# Patient Record
Sex: FEMALE | Race: BLACK OR AFRICAN AMERICAN | NOT HISPANIC OR LATINO | URBAN - METROPOLITAN AREA
[De-identification: names, ages, dates, MRNs, and addresses within clinical notes are randomized per-mention and may not be internally consistent; named-entity substitution may affect disease eponyms.]

---

## 2021-01-25 ENCOUNTER — INPATIENT (INPATIENT)
Facility: HOSPITAL | Age: 36
LOS: 8 days | Discharge: HOME | DRG: 29 | End: 2021-02-03
Attending: INTERNAL MEDICINE | Admitting: STUDENT IN AN ORGANIZED HEALTH CARE EDUCATION/TRAINING PROGRAM
Payer: MEDICAID

## 2021-01-25 VITALS
HEIGHT: 65 IN | DIASTOLIC BLOOD PRESSURE: 95 MMHG | TEMPERATURE: 98 F | SYSTOLIC BLOOD PRESSURE: 179 MMHG | WEIGHT: 293 LBS | RESPIRATION RATE: 20 BRPM | OXYGEN SATURATION: 99 % | HEART RATE: 85 BPM

## 2021-01-25 DIAGNOSIS — Z98.84 BARIATRIC SURGERY STATUS: Chronic | ICD-10-CM

## 2021-01-25 LAB
ALBUMIN SERPL ELPH-MCNC: 4 G/DL — SIGNIFICANT CHANGE UP (ref 3.3–5)
ALP SERPL-CCNC: 78 U/L — SIGNIFICANT CHANGE UP (ref 40–120)
ALT FLD-CCNC: 10 U/L — SIGNIFICANT CHANGE UP (ref 10–45)
ANION GAP SERPL CALC-SCNC: 12 MMOL/L — SIGNIFICANT CHANGE UP (ref 5–17)
AST SERPL-CCNC: 12 U/L — SIGNIFICANT CHANGE UP (ref 10–40)
BILIRUB SERPL-MCNC: 0.3 MG/DL — SIGNIFICANT CHANGE UP (ref 0.2–1.2)
BUN SERPL-MCNC: 17 MG/DL — SIGNIFICANT CHANGE UP (ref 7–23)
CALCIUM SERPL-MCNC: 9.1 MG/DL — SIGNIFICANT CHANGE UP (ref 8.4–10.5)
CHLORIDE SERPL-SCNC: 104 MMOL/L — SIGNIFICANT CHANGE UP (ref 96–108)
CK SERPL-CCNC: 83 U/L — SIGNIFICANT CHANGE UP (ref 25–170)
CO2 SERPL-SCNC: 25 MMOL/L — SIGNIFICANT CHANGE UP (ref 22–31)
CREAT SERPL-MCNC: 0.6 MG/DL — SIGNIFICANT CHANGE UP (ref 0.5–1.3)
GLUCOSE SERPL-MCNC: 86 MG/DL — SIGNIFICANT CHANGE UP (ref 70–99)
HCT VFR BLD CALC: 37.7 % — SIGNIFICANT CHANGE UP (ref 34.5–45)
HGB BLD-MCNC: 13 G/DL — SIGNIFICANT CHANGE UP (ref 11.5–15.5)
MCHC RBC-ENTMCNC: 29.4 PG — SIGNIFICANT CHANGE UP (ref 27–34)
MCHC RBC-ENTMCNC: 34.5 GM/DL — SIGNIFICANT CHANGE UP (ref 32–36)
MCV RBC AUTO: 85.3 FL — SIGNIFICANT CHANGE UP (ref 80–100)
NRBC # BLD: 0 /100 WBCS — SIGNIFICANT CHANGE UP (ref 0–0)
PLATELET # BLD AUTO: 214 K/UL — SIGNIFICANT CHANGE UP (ref 150–400)
POTASSIUM SERPL-MCNC: 4.2 MMOL/L — SIGNIFICANT CHANGE UP (ref 3.5–5.3)
POTASSIUM SERPL-SCNC: 4.2 MMOL/L — SIGNIFICANT CHANGE UP (ref 3.5–5.3)
PROT SERPL-MCNC: 7.3 G/DL — SIGNIFICANT CHANGE UP (ref 6–8.3)
RBC # BLD: 4.42 M/UL — SIGNIFICANT CHANGE UP (ref 3.8–5.2)
RBC # FLD: 14.6 % — HIGH (ref 10.3–14.5)
SODIUM SERPL-SCNC: 141 MMOL/L — SIGNIFICANT CHANGE UP (ref 135–145)
WBC # BLD: 9.5 K/UL — SIGNIFICANT CHANGE UP (ref 3.8–10.5)
WBC # FLD AUTO: 9.5 K/UL — SIGNIFICANT CHANGE UP (ref 3.8–10.5)

## 2021-01-25 PROCEDURE — 72148 MRI LUMBAR SPINE W/O DYE: CPT | Mod: 26,MA

## 2021-01-25 PROCEDURE — 99285 EMERGENCY DEPT VISIT HI MDM: CPT

## 2021-01-25 PROCEDURE — 73502 X-RAY EXAM HIP UNI 2-3 VIEWS: CPT | Mod: 26,LT

## 2021-01-25 PROCEDURE — G1004: CPT

## 2021-01-25 PROCEDURE — 93010 ELECTROCARDIOGRAM REPORT: CPT

## 2021-01-25 PROCEDURE — 72132 CT LUMBAR SPINE W/DYE: CPT | Mod: 26,MF

## 2021-01-25 RX ORDER — ACETAMINOPHEN 500 MG
975 TABLET ORAL ONCE
Refills: 0 | Status: COMPLETED | OUTPATIENT
Start: 2021-01-25 | End: 2021-01-25

## 2021-01-25 RX ORDER — DEXAMETHASONE 0.5 MG/5ML
10 ELIXIR ORAL ONCE
Refills: 0 | Status: COMPLETED | OUTPATIENT
Start: 2021-01-25 | End: 2021-01-25

## 2021-01-25 RX ORDER — DEXAMETHASONE 0.5 MG/5ML
10 ELIXIR ORAL ONCE
Refills: 0 | Status: DISCONTINUED | OUTPATIENT
Start: 2021-01-25 | End: 2021-01-25

## 2021-01-25 RX ORDER — OXYCODONE HYDROCHLORIDE 5 MG/1
5 TABLET ORAL ONCE
Refills: 0 | Status: DISCONTINUED | OUTPATIENT
Start: 2021-01-25 | End: 2021-01-25

## 2021-01-25 RX ADMIN — OXYCODONE HYDROCHLORIDE 5 MILLIGRAM(S): 5 TABLET ORAL at 20:12

## 2021-01-25 NOTE — ED PROVIDER NOTE - PHYSICAL EXAMINATION
GENERAL: Awake, alert, NAD, laying on right side in stretcher  HEENT: NC/AT, moist mucous membranes, PERRL, EOMI  LUNGS: CTAB, no wheezes or crackles   CARDIAC: RRR, no m/r/g  ABDOMEN: Soft, normal BS, non tender, non distended, no rebound, no guarding  BACK: No midline spinal tenderness, no CVA tenderness  EXT: No edema, no calf tenderness, 2+ DP pulses bilaterally, no deformities. LLE - Full ROM knee, hip joint. No obvious deformities or ecchymosis. No pelvic instability.   NEURO: A&Ox3. Moving all extremities.  SKIN: Warm and dry. No rash.  PSYCH: Normal affect. GENERAL: Awake, alert, NAD, laying on right side in stretcher  HEENT: NC/AT, moist mucous membranes, PERRL, EOMI  LUNGS: CTAB, no wheezes or crackles   CARDIAC: RRR, no m/r/g  ABDOMEN: Soft, normal BS, non tender, non distended, no rebound, no guarding  BACK: No midline spinal tenderness, no CVA tenderness  EXT: No edema, no calf tenderness, 2+ DP pulses bilaterally, no deformities. LLE - Full ROM knee, hip joint. No obvious deformities or ecchymosis. No pelvic instability.   NEURO: A&Ox3. Moving all extremities. 5/5 strength RLE, 4.5/5 strength LLE. Numbness L groin.   SKIN: Warm and dry. No rash.  PSYCH: Normal affect.

## 2021-01-25 NOTE — ED PROVIDER NOTE - NS ED ROS FT
CONST: no fevers, no chills  EYES: no pain, no vision changes  ENT: no sore throat, no ear pain, no change in hearing  CV: no chest pain, no leg swelling  RESP: no shortness of breath, no cough  ABD: no abdominal pain, no nausea, no vomiting, no diarrhea  : no dysuria, no flank pain, no hematuria  MSK: no back pain, + left buttocks pain  NEURO: no headache or additional neurologic complaints  HEME: no easy bleeding  SKIN:  no rash

## 2021-01-25 NOTE — CONSULT NOTE ADULT - ATTENDING COMMENTS
Patient seen and examined.  Patient feels improved with the steroids.  On current exam she has full strength.  No numbness or tingling.  She has been up and ambulating and going to the bathroom without difficulty.  No saddle anesthesia.  MRI has been reviewed with the patient.  She has a very large disc herniation L5-S1.  In light of this and given her previous symptoms of numbness I have recommended surgery.  Risks and benefits were discussed at length.  At this point the patient declineds surgery and wishes to continue with nonsurgical management.  Patient was again counseled on signs and symptoms of cauda equina syndrome including buttock numbness or saddle anesthesia, bowel or bladder incontinence or retention, and weakness.  She was advised to tell us of these symptoms immediately. Recommend continue Decadron and monitoring of exam for neurologic changes.

## 2021-01-25 NOTE — ED ADULT NURSE NOTE - NSIMPLEMENTINTERV_GEN_ALL_ED
Implemented All Fall with Harm Risk Interventions:  [place] to call system. Call bell, personal items and telephone within reach. Instruct patient to call for assistance. Room bathroom lighting operational. Non-slip footwear when patient is off stretcher. Physically safe environment: no spills, clutter or unnecessary equipment. Stretcher in lowest position, wheels locked, appropriate side rails in place. Provide visual cue, wrist band, yellow gown, etc. Monitor gait and stability. Monitor for mental status changes and reorient to person, place, and time. Review medications for side effects contributing to fall risk. Reinforce activity limits and safety measures with patient and family. Provide visual clues: red socks.

## 2021-01-25 NOTE — CONSULT NOTE ADULT - ASSESSMENT
A/P :   Patient is a 35yFemale w/ chronic LBP and urinary incontinence now with left saddle anesthesia.    -Recommend CT/MRI LSpine to r/o cauda equina.   -Further recommendations to follow pending imaging.  -Low concern for gluteal compartment syndrome  -WBAT  -PT/OT prn  -Patient was extensively educated about the signs and symptoms of osteomyelitis, epidural abscess, discitis, acute cord compression. The patient was advised to return to notify physician/ed should he develop neurological symptoms such as weakness, numbness/tingling/paresthesias, worsening pain, bowel/bladder incontinence, or fevers/chills.  -Recommend weight loss/core strengthening for improved back health.  -Multimodal analgesia - recommend low dose opioids, acetaminophen, muscle relaxant as tolerated  -All patient's questions answered. Patient understands and agrees with above plan.  -Clinical presentation discussed w/ Dr. Keane who is aware and agrees w/ above plan.    Richard Mcgee M.D.   Orthopaedic Surgery  [id number] ***INCOMPLETE NOTE/IN PROGRESS/Full Note To Follow***    A/P :   Patient is a 35yFemale w/ chronic LBP and urinary incontinence now with left sided buttock numbness    -Low concern for gluteal compartment syndrome  -WBAT  -PT/OT prn  -Patient was extensively educated about the signs and symptoms of osteomyelitis, epidural abscess, discitis, acute cord compression. The patient was advised to return to notify physician/ed should he develop neurological symptoms such as weakness, numbness/tingling/paresthesias, worsening pain, bowel/bladder incontinence, or fevers/chills.  -Recommend weight loss/core strengthening for improved back health.  -Multimodal analgesia - recommend low dose opioids, acetaminophen, muscle relaxant as tolerated  -All patient's questions answered. Patient understands and agrees with above plan.  -Clinical presentation discussed w/ Dr. Keane who is aware and agrees w/ above plan.    Richard Mcgee M.D.   Orthopaedic Surgery  [id number] A/P :   Patient is a 35yFemale w/ chronic LBP and urinary incontinence now with left sided buttock numbness.     -Had an extensive discussion with patient regarding risk/benefit of surgical intervention versus trial of conservative management. Pt would like to trial conservative management at this time and is aware of possible need for surgical intervention in the future.   -Recommend admit to medical service   -Recommend IV decadron 10 q6h  -Multimodal analgesia - recommend low dose opioids, acetaminophen, muscle relaxant as tolerated  -Low concern for gluteal compartment syndrome  -WBAT  -PT/OT prn  -Patient was extensively educated about the signs and symptoms of osteomyelitis, epidural abscess, discitis, acute cord compression. The patient was advised to return to notify physician/ed should she develop neurological symptoms such as weakness, numbness/tingling/paresthesias, worsening pain, bowel/bladder incontinence, or fevers/chills.  -Recommend weight loss/core strengthening for improved back health.  -All patient's questions answered. Patient understands and agrees with above plan.  -Imaging & clinical presentation discussed w/ Dr. Keane who is aware and agrees w/ above plan.    Richard Mcgee M.D.   Orthopaedic Surgery  [id number]

## 2021-01-25 NOTE — ED ADULT NURSE REASSESSMENT NOTE - NS ED NURSE REASSESS COMMENT FT1
Report received from Dulce Maria BECERRA who stated that pt refused Tylenol. Pt A&Ox4, continues to c/o 10/10 Lt buttock pain that radiates down the LLE... Report received from Dulce Maria BECERRA who stated that pt refused Tylenol. Pt A&Ox4, continues to c/o 10/10 Lt buttock pain that radiates down the LLE. Pt continues to decline Tylenol, stating "Tylenol never helps me with any pain." William ZUNIGA aware, recommends administering 5 mg oxycodone. Report received from Dulce Maria BECERRA who stated that pt refused Tylenol. Pt A&Ox4, continues to c/o 10/10 Lt buttock pain that radiates down the LLE. Pt continues to decline Tylenol, stating "Tylenol never helps me with any pain." Assisted pt with repositioning for comfort. William ZUNIGA aware, recommends administering 5 mg oxycodone. Report received from Dulce Maria BECERRA who stated that pt refused Tylenol. Pt A&Ox4, continues to c/o 10/10 Lt buttock pain that radiates down the LLE. Pt continues to decline Tylenol, stating "Tylenol never helps me with any pain." Assisted pt with repositioning for comfort. Pt's /115, denies blurry vision & H/A. Pt attributes elevated BP to sensation of pain in buttock. Pt states that she took home Losartan this AM as prescribed. William ZUNIGA aware, recommends administering 5 mg oxycodone.

## 2021-01-25 NOTE — ED PROVIDER NOTE - ATTENDING CONTRIBUTION TO CARE
attending William: 35yF h/o morbid obesity, HTN, urinary continence s/p gastric sleeve surgery presents with L sided pain after mechanical fall last night. Reports L knee gave out, fell on to her L side. Was unable to get up from floor 2/2 pain x 12 hours. Pain assoc with "numbness" to L groin area. Saddle anesthesia on exam with ?chronic urinary incontinence. Concern for cauda equina. Will obtain labs, pain control, CT and MR lumbar spine, spine surgery eval in ED, likely admit.

## 2021-01-25 NOTE — ED ADULT NURSE NOTE - OBJECTIVE STATEMENT
pt 34 yo female morbidly obese and htn hx s/p fall last night in house denies loc states she slept on floor until a friend helped her up this morning pt usually ambulates independently pt states no relief after she took motrin flexeril at home only comfortable position is lying reclined on right side pt alert oriented yue any furter complains assisted in changing into gown and pt is pending md [place] skin warm dry no nausea no vomiting pt states she fell onto tile floor and denies any head injury

## 2021-01-25 NOTE — ED PROVIDER NOTE - CLINICAL SUMMARY MEDICAL DECISION MAKING FREE TEXT BOX
35 year old F s/p fall. +groin anesthesia. Non ambulatory post fall. Will CT spine, XR L hip and L pelvis. Pain control. Consult spine.

## 2021-01-25 NOTE — ED ADULT NURSE REASSESSMENT NOTE - NS ED NURSE REASSESS COMMENT FT1
MRI paperwork given to Vipul (pt rep) who is faxing it to MRI department for MRI scheduled for 2200 tonight.

## 2021-01-25 NOTE — CONSULT NOTE ADULT - SUBJECTIVE AND OBJECTIVE BOX
Patient is a 35yFemale community ambulator without assistive devices who presents to [facility] ED w/ a c/o of back pain and DARIELA sp MF yesterday. Patient states she was unable to get up after falling and remained down until today. Denies HS/LOC. Localizing pain to low back, denies radiation of pain elsewhere. States inability to ambulate immediately following the injury. Denies pain down legs, Reports left saddle anesthesia. Reports chronic history of urinary incontinence & LBP but denies bowel incontinence. Denies fevers/chills Denies having any other pain elsewhere. Denies any previous orthopaedic history. No other orthopaedic concerns at this time.     PAST MEDICAL & SURGICAL HISTORY:  Hypertension  H/O bariatric surgery   delivery delivered    Vital Signs Last 24 Hrs  T(C): 36.7 (2021 19:47), Max: 36.7 (2021 15:59)  T(F): 98.1 (2021 19:47), Max: 98.1 (2021 15:59)  HR: 83 (2021 19:47) (83 - 85)  BP: 177/115 (2021 19:47) (177/115 - 179/95)  BP(mean): --  RR: 24 (2021 19:47) (20 - 24)  SpO2: 100% (2021 19:47) (99% - 100%)  I&O's Detail      LABS:                        13.0   9.50  )-----------( 214      ( 2021 18:26 )             37.7         141  |  104  |  17  ----------------------------<  86  4.2   |  25  |  0.60    Ca    9.1      2021 18:26    TPro  7.3  /  Alb  4.0  /  TBili  0.3  /  DBili  x   /  AST  12  /  ALT  10  /  AlkPhos  78            PHYSICAL EXAM:  General; Awake and alert, Oriented x 3, following commands  Spine: No TTP over spinous processes or paraspinal muscles at C/T/L spine. No palpable step off.   Able to actively SLR BL. No pain to passive SLR    + Passive/Active rectal tone  + saddle anesthesia on the left    Motor exam:        C5       C6       C7       C8       T1   R  5/5      5/5     5/5     5/5      5/5  L   5/5      5/5     5/5     5/5      5/5         L2        L3       L4       L5      S1  R  5/5      5/5     5/5     5/5    5/5  L   5/5     5/5      5/5     5/5    5/5    Sensory:  (0=absent, 1=impaired, 2=normal, NT=not testable)      C5    C6   C7   C8   T1         R   2     2      2     2      2  L    2     2      2     2      2        L2    L3   L4   L5   S1   R   2     2     2     2     2  L    2     2     2     0     2    BL Upper extremity:   Negative Madsen  +Rad Pulse  Compartments soft and compressible           BL Lower Extremity:  SILT L2-S1  Negative Clonus   Negative Babinski  +DP  Compartments soft and compressible    Secondary  Skin intact. No erythema/ecchymosis. No TTP over bony prominences, SILT, palpable pulses, full/painless A/PROM, compartments soft. No other injuries or complaints. Negative logroll/heelstrike BL.                                              Patient is a 35yFemale community ambulator without assistive devices who presents to [facility] ED w/ a c/o of back pain and DARIELA sp MF yesterday. Patient states she was unable to get up after falling and remained down until today. Denies HS/LOC. Localizing pain to low back, denies radiation of pain elsewhere. States inability to ambulate immediately following the injury. Denies pain down legs, Reports numbness about the left buttock/groin. Reports chronic history of urinary incontinence & LBP but denies bowel incontinence. Denies fevers/chills Denies having any other pain elsewhere. Denies any previous orthopaedic history. No other orthopaedic concerns at this time.     PAST MEDICAL & SURGICAL HISTORY:  Hypertension  H/O bariatric surgery   delivery delivered    Vital Signs Last 24 Hrs  T(C): 36.7 (2021 19:47), Max: 36.7 (2021 15:59)  T(F): 98.1 (2021 19:47), Max: 98.1 (2021 15:59)  HR: 83 (2021 19:47) (83 - 85)  BP: 177/115 (2021 19:47) (177/115 - 179/95)  BP(mean): --  RR: 24 (2021 19:47) (20 - 24)  SpO2: 100% (2021 19:47) (99% - 100%)  I&O's Detail      LABS:                        13.0   9.50  )-----------( 214      ( 2021 18:26 )             37.7         141  |  104  |  17  ----------------------------<  86  4.2   |  25  |  0.60    Ca    9.1      2021 18:26    TPro  7.3  /  Alb  4.0  /  TBili  0.3  /  DBili  x   /  AST  12  /  ALT  10  /  AlkPhos  78            PHYSICAL EXAM:  General; Awake and alert, Oriented x 3, following commands  Spine: No TTP over spinous processes or paraspinal muscles at C/T/L spine. No palpable step off.   Able to actively SLR BL. No pain to passive SLR    + Passive/Active rectal tone  +numbness about the left buttock    Motor exam:        C5       C6       C7       C8       T1   R  5/5      5/5     5/5     5/5      5/5  L   5/5      5/5     5/5     5/5      5/5         L2        L3       L4       L5      S1  R  5/5      5/5     5/5     5/5    5/5  L   5/5     5/5      5/5     5/5    5/5    Sensory:  (0=absent, 1=impaired, 2=normal, NT=not testable)      C5    C6   C7   C8   T1         R   2     2      2     2      2  L    2     2      2     2      2        L2    L3   L4   L5   S1   R   2     2     2     2     2  L    2     2     2     0     2    BL Upper extremity:   Negative Madsen  +Rad Pulse  Compartments soft and compressible           BL Lower Extremity:  SILT L2-S1  Negative Clonus   Negative Babinski  +DP  Compartments soft and compressible    Secondary  Skin intact. No erythema/ecchymosis. No TTP over bony prominences, SILT, palpable pulses, full/painless A/PROM, compartments soft. No other injuries or complaints. Negative logroll/heelstrike BL.                                             Imaging:  CT/MRI Demonstrates multilevel degenerative disc disease, HNP w/ moderate central stenosis at L3/4 & L4-5, large HNP w/ severe central stenosis at L5-S1.

## 2021-01-25 NOTE — ED PROVIDER NOTE - OBJECTIVE STATEMENT
35 year old F with PMH HTN, urinary continence s/p gastric sleeve surgery presenting with L sided body pain after a mechanical fall in her home last night. Patient was in her bedroom when she felt her L knee give out and fell onto her left side. No LOC or head trauma, not on any anticoagulation. States she could not get up after falling so she slept on the floor all night. Her friend with a wheelchair picked her up today and brought her to ED. Took 1200mg ibuprofen and 10mg flexeril at 11am with no relief. Non ambulatory in ED. Notes a history of chronic lower back pain. Also endorses numbness in the vaginal area, complaing mostly of pain in her left buttocks. Denies CP, SOB, cough, LE edema, fevers, abdominal pain. 35 year old F with PMH HTN, urinary continence s/p gastric sleeve surgery presenting with L sided body pain after a mechanical fall in her home last night. Patient was in her bedroom when she felt her L knee give out and fell onto her left side. No LOC or head trauma, not on any anticoagulation. States she could not get up after falling so she slept on the floor all night. Her friend with a wheelchair picked her up today and brought her to ED. Took 1200mg ibuprofen and 10mg flexeril at 11am with no relief. Non ambulatory in ED. Notes a history of chronic lower back pain. Also endorses numbness in the vaginal area, complaining mostly of pain in her left buttocks. Denies CP, SOB, cough, LE edema, fevers, abdominal pain.

## 2021-01-26 DIAGNOSIS — Z79.899 OTHER LONG TERM (CURRENT) DRUG THERAPY: ICD-10-CM

## 2021-01-26 DIAGNOSIS — G83.4 CAUDA EQUINA SYNDROME: ICD-10-CM

## 2021-01-26 DIAGNOSIS — Z29.9 ENCOUNTER FOR PROPHYLACTIC MEASURES, UNSPECIFIED: ICD-10-CM

## 2021-01-26 DIAGNOSIS — I10 ESSENTIAL (PRIMARY) HYPERTENSION: ICD-10-CM

## 2021-01-26 DIAGNOSIS — E66.01 MORBID (SEVERE) OBESITY DUE TO EXCESS CALORIES: ICD-10-CM

## 2021-01-26 DIAGNOSIS — M51.9 UNSPECIFIED THORACIC, THORACOLUMBAR AND LUMBOSACRAL INTERVERTEBRAL DISC DISORDER: ICD-10-CM

## 2021-01-26 DIAGNOSIS — R20.0 ANESTHESIA OF SKIN: ICD-10-CM

## 2021-01-26 DIAGNOSIS — M54.42 LUMBAGO WITH SCIATICA, LEFT SIDE: ICD-10-CM

## 2021-01-26 DIAGNOSIS — Z98.84 BARIATRIC SURGERY STATUS: ICD-10-CM

## 2021-01-26 DIAGNOSIS — Z71.6 TOBACCO ABUSE COUNSELING: ICD-10-CM

## 2021-01-26 LAB — SARS-COV-2 RNA SPEC QL NAA+PROBE: SIGNIFICANT CHANGE UP

## 2021-01-26 PROCEDURE — 99223 1ST HOSP IP/OBS HIGH 75: CPT | Mod: 25

## 2021-01-26 PROCEDURE — 12345: CPT | Mod: NC,GC

## 2021-01-26 PROCEDURE — 99406 BEHAV CHNG SMOKING 3-10 MIN: CPT

## 2021-01-26 RX ORDER — GABAPENTIN 400 MG/1
300 CAPSULE ORAL THREE TIMES A DAY
Refills: 0 | Status: DISCONTINUED | OUTPATIENT
Start: 2021-01-26 | End: 2021-01-26

## 2021-01-26 RX ORDER — ACETAMINOPHEN 500 MG
650 TABLET ORAL EVERY 6 HOURS
Refills: 0 | Status: DISCONTINUED | OUTPATIENT
Start: 2021-01-26 | End: 2021-02-03

## 2021-01-26 RX ORDER — DULOXETINE HYDROCHLORIDE 30 MG/1
30 CAPSULE, DELAYED RELEASE ORAL DAILY
Refills: 0 | Status: DISCONTINUED | OUTPATIENT
Start: 2021-01-26 | End: 2021-02-02

## 2021-01-26 RX ORDER — HYDROCHLOROTHIAZIDE 25 MG
25 TABLET ORAL ONCE
Refills: 0 | Status: COMPLETED | OUTPATIENT
Start: 2021-01-26 | End: 2021-01-26

## 2021-01-26 RX ORDER — HYDRALAZINE HCL 50 MG
10 TABLET ORAL ONCE
Refills: 0 | Status: COMPLETED | OUTPATIENT
Start: 2021-01-26 | End: 2021-01-26

## 2021-01-26 RX ORDER — OXYCODONE HYDROCHLORIDE 5 MG/1
5 TABLET ORAL EVERY 4 HOURS
Refills: 0 | Status: DISCONTINUED | OUTPATIENT
Start: 2021-01-26 | End: 2021-01-27

## 2021-01-26 RX ORDER — ACETAMINOPHEN 500 MG
650 TABLET ORAL EVERY 6 HOURS
Refills: 0 | Status: DISCONTINUED | OUTPATIENT
Start: 2021-01-26 | End: 2021-01-26

## 2021-01-26 RX ORDER — LABETALOL HCL 100 MG
5 TABLET ORAL ONCE
Refills: 0 | Status: COMPLETED | OUTPATIENT
Start: 2021-01-26 | End: 2021-01-26

## 2021-01-26 RX ORDER — OXYCODONE HYDROCHLORIDE 5 MG/1
5 TABLET ORAL ONCE
Refills: 0 | Status: DISCONTINUED | OUTPATIENT
Start: 2021-01-26 | End: 2021-01-26

## 2021-01-26 RX ORDER — FOLIC ACID 0.8 MG
1 TABLET ORAL
Qty: 0 | Refills: 0 | DISCHARGE

## 2021-01-26 RX ORDER — CHOLECALCIFEROL (VITAMIN D3) 125 MCG
1 CAPSULE ORAL
Qty: 0 | Refills: 0 | DISCHARGE

## 2021-01-26 RX ORDER — SENNA PLUS 8.6 MG/1
2 TABLET ORAL AT BEDTIME
Refills: 0 | Status: DISCONTINUED | OUTPATIENT
Start: 2021-01-26 | End: 2021-01-28

## 2021-01-26 RX ORDER — FOLIC ACID 0.8 MG
1 TABLET ORAL DAILY
Refills: 0 | Status: DISCONTINUED | OUTPATIENT
Start: 2021-01-26 | End: 2021-01-30

## 2021-01-26 RX ORDER — ENOXAPARIN SODIUM 100 MG/ML
40 INJECTION SUBCUTANEOUS DAILY
Refills: 0 | Status: DISCONTINUED | OUTPATIENT
Start: 2021-01-26 | End: 2021-01-29

## 2021-01-26 RX ORDER — HYDROCHLOROTHIAZIDE 25 MG
50 TABLET ORAL DAILY
Refills: 0 | Status: DISCONTINUED | OUTPATIENT
Start: 2021-01-27 | End: 2021-02-03

## 2021-01-26 RX ORDER — PREGABALIN 225 MG/1
1 CAPSULE ORAL
Qty: 0 | Refills: 0 | DISCHARGE

## 2021-01-26 RX ORDER — LOSARTAN POTASSIUM 100 MG/1
100 TABLET, FILM COATED ORAL DAILY
Refills: 0 | Status: DISCONTINUED | OUTPATIENT
Start: 2021-01-26 | End: 2021-02-03

## 2021-01-26 RX ORDER — PREGABALIN 225 MG/1
1000 CAPSULE ORAL DAILY
Refills: 0 | Status: DISCONTINUED | OUTPATIENT
Start: 2021-01-26 | End: 2021-02-03

## 2021-01-26 RX ORDER — DEXAMETHASONE 0.5 MG/5ML
6 ELIXIR ORAL EVERY 6 HOURS
Refills: 0 | Status: DISCONTINUED | OUTPATIENT
Start: 2021-01-26 | End: 2021-01-26

## 2021-01-26 RX ORDER — PANTOPRAZOLE SODIUM 20 MG/1
40 TABLET, DELAYED RELEASE ORAL
Refills: 0 | Status: DISCONTINUED | OUTPATIENT
Start: 2021-01-26 | End: 2021-02-03

## 2021-01-26 RX ORDER — DEXAMETHASONE 0.5 MG/5ML
10 ELIXIR ORAL EVERY 6 HOURS
Refills: 0 | Status: COMPLETED | OUTPATIENT
Start: 2021-01-26 | End: 2021-01-27

## 2021-01-26 RX ORDER — HYDROCHLOROTHIAZIDE 25 MG
25 TABLET ORAL DAILY
Refills: 0 | Status: DISCONTINUED | OUTPATIENT
Start: 2021-01-26 | End: 2021-01-26

## 2021-01-26 RX ADMIN — Medication 25 MILLIGRAM(S): at 14:47

## 2021-01-26 RX ADMIN — DULOXETINE HYDROCHLORIDE 30 MILLIGRAM(S): 30 CAPSULE, DELAYED RELEASE ORAL at 14:47

## 2021-01-26 RX ADMIN — PANTOPRAZOLE SODIUM 40 MILLIGRAM(S): 20 TABLET, DELAYED RELEASE ORAL at 05:53

## 2021-01-26 RX ADMIN — LOSARTAN POTASSIUM 100 MILLIGRAM(S): 100 TABLET, FILM COATED ORAL at 05:53

## 2021-01-26 RX ADMIN — GABAPENTIN 300 MILLIGRAM(S): 400 CAPSULE ORAL at 12:19

## 2021-01-26 RX ADMIN — Medication 1 MILLIGRAM(S): at 12:19

## 2021-01-26 RX ADMIN — Medication 650 MILLIGRAM(S): at 05:34

## 2021-01-26 RX ADMIN — Medication 1 TABLET(S): at 12:19

## 2021-01-26 RX ADMIN — OXYCODONE HYDROCHLORIDE 5 MILLIGRAM(S): 5 TABLET ORAL at 12:19

## 2021-01-26 RX ADMIN — Medication 25 MILLIGRAM(S): at 05:54

## 2021-01-26 RX ADMIN — Medication 650 MILLIGRAM(S): at 17:10

## 2021-01-26 RX ADMIN — Medication 102 MILLIGRAM(S): at 17:11

## 2021-01-26 RX ADMIN — Medication 10 MILLIGRAM(S): at 17:25

## 2021-01-26 RX ADMIN — OXYCODONE HYDROCHLORIDE 5 MILLIGRAM(S): 5 TABLET ORAL at 05:31

## 2021-01-26 RX ADMIN — Medication 102 MILLIGRAM(S): at 05:54

## 2021-01-26 RX ADMIN — ENOXAPARIN SODIUM 40 MILLIGRAM(S): 100 INJECTION SUBCUTANEOUS at 14:47

## 2021-01-26 RX ADMIN — Medication 102 MILLIGRAM(S): at 00:00

## 2021-01-26 RX ADMIN — OXYCODONE HYDROCHLORIDE 5 MILLIGRAM(S): 5 TABLET ORAL at 17:11

## 2021-01-26 RX ADMIN — PREGABALIN 1000 MICROGRAM(S): 225 CAPSULE ORAL at 12:19

## 2021-01-26 RX ADMIN — Medication 102 MILLIGRAM(S): at 12:20

## 2021-01-26 RX ADMIN — OXYCODONE HYDROCHLORIDE 5 MILLIGRAM(S): 5 TABLET ORAL at 00:24

## 2021-01-26 RX ADMIN — Medication 10 MILLIGRAM(S): at 19:08

## 2021-01-26 RX ADMIN — Medication 650 MILLIGRAM(S): at 12:19

## 2021-01-26 NOTE — PROVIDER CONTACT NOTE (OTHER) - BACKGROUND
HTN, back pain
Back pain s/p fall at home, High blood Pressure
Hypertension , Back pain s/p fall at home

## 2021-01-26 NOTE — H&P ADULT - NSHPREVIEWOFSYSTEMS_GEN_ALL_CORE
CONSTITUTIONAL: No fever, no chills  EYES: No eye pain, no acute blindness  Mouth: no pain in mouth, no cuts  RESPIRATORY: No cough, No sob  CARDIOVASCULAR: No CP, no palpitations  GASTROINTESTINAL: no abdominal pain, no n/v/d  GENITOURINARY: No dysuria, no hematuria  Heme: No easy bruising, no swelling of neck  NEUROLOGICAL: No seizure, No acute paralysis, lower back pain+, numbness in groin and buttocks+  SKIN: No itching, no rashes  MUSCULOSKELETAL: No acute joint pain, no joint swelling

## 2021-01-26 NOTE — PROGRESS NOTE ADULT - PROBLEM SELECTOR PROBLEM 5
Class 3 severe obesity with serious comorbidity and body mass index (BMI) of 50.0 to 59.9 in adult, unspecified obesity type Tobacco abuse counseling

## 2021-01-26 NOTE — PROGRESS NOTE ADULT - PROBLEM SELECTOR PLAN 5
Patient advised to follow up with obesity medicine specialist to explore pharmacologic treatment I spent 4 minutes with patient discussing tobacco use cessation. Patient counseled on available pharmacologic interventions.    Billing Code:[id number] - smoking cessation  counselling provided

## 2021-01-26 NOTE — PROGRESS NOTE ADULT - ASSESSMENT
35F w/ class III obesity s/p gastric sleeve(2019; 100lb weight loss), chronic lower back pain with sciatica, HTN, chronic urinary incontinence presents with back pain and numbness over buttocks, groin, and left foot after fall admitted to medicine for further management. MRI demonstrates large central through left subvarticular zone disc extrusion at L5-S1 occupying nearly the entire cross-sectional area of the spinal cord and severely effacing the thecal sac and compressing the cauda equina nerve roots.  35F w/ class III obesity s/p gastric sleeve(2019; 100lb weight loss), chronic lower back pain with sciatica, HTN, chronic urinary incontinence presents with back pain and numbness over buttocks, groin, and left foot after fall admitted to medicine for further management. Physical exam is significant for decreased left leg strength 4/5 and decreased sensation of left hip, left foot and toes. MRI demonstrates compression of the cauda equina nerve roots by disc at L5-S1.

## 2021-01-26 NOTE — PROGRESS NOTE ADULT - PROBLEM SELECTOR PROBLEM 3
Mom called concerned he may have blood in his stool.   Essential hypertension Bariatric surgery status

## 2021-01-26 NOTE — PROVIDER CONTACT NOTE (OTHER) - ASSESSMENT
No s/s of distress. No new complaints
Patient c/o headaches that she verbalizes of her elevated BP. No new s/s
no signs of distress noted

## 2021-01-26 NOTE — H&P ADULT - PROBLEM SELECTOR PLAN 6
I spent 4 minutes with patient discussing tobacco use cessation. Patient counseled on available pharmacoligic interventions.    Billing Code:[id number]

## 2021-01-26 NOTE — H&P ADULT - NSHPPHYSICALEXAM_GEN_ALL_CORE
Vital Signs Last 24 Hrs  T(C): 36.7 (26 Jan 2021 00:12), Max: 36.7 (25 Jan 2021 15:59)  T(F): 98 (26 Jan 2021 00:12), Max: 98.1 (25 Jan 2021 15:59)  HR: 78 (26 Jan 2021 00:12) (78 - 85)  BP: 172/101 (26 Jan 2021 00:13) (172/101 - 188/123)  BP(mean): 124 (26 Jan 2021 00:13) (124 - 141)  RR: 24 (26 Jan 2021 00:12) (20 - 24)  SpO2: 100% (26 Jan 2021 00:12) (99% - 100%) on RA    GENERAL: NAD while lying on right side, obese  HEAD:  Atraumatic, Normocephalic  EYES: EOMI, PERRL, conjunctiva and sclera clear  Mouth: MMM, no lesions  NECK: Supple, no appreciable masses  Lung: normal work of breathing, cta b/l  Chest: S1&S2+, rrr, no m/r/g appreciated  ABDOMEN: bs+, soft, nt, nd, no appreciable masses  : No garcia catheter, no CVA tenderness  EXTREMITIES:  radial pulse present b/l, PT present b/l, no pitting edema present  Neuro: A&Ox3, no flaccid paralysis in extremities appreciated, reported numbness over palpation to left hip/buttock and left foot  SKIN: warm and dry, no visible purulence in exposed areas

## 2021-01-26 NOTE — H&P ADULT - PROBLEM SELECTOR PLAN 8
Hold ac as patient may require surgery if not improving Hold ac as patient may require surgery if not improving  istop#: [id number]   01/02/2021 01/07/2021 oxycodone hcl 5 mg tablet  6 2 [facility] #[id number]

## 2021-01-26 NOTE — PROGRESS NOTE ADULT - PROBLEM SELECTOR PLAN 4
s/p gastric sleeve  continue with vitamin supplementation Patient advised to follow up with obesity medicine specialist to explore pharmacologic treatment - outpatient f/u obesity clinic

## 2021-01-26 NOTE — H&P ADULT - NSHPSOCIALHISTORY_GEN_ALL_CORE
current smoker (1 pack per week), no alcohol, rare social marijuana, no other recreational drug. . has 2 young children

## 2021-01-26 NOTE — PROGRESS NOTE ADULT - PROBLEM SELECTOR PROBLEM 4
Bariatric surgery status Class 3 severe obesity with serious comorbidity and body mass index (BMI) of 50.0 to 59.9 in adult, unspecified obesity type

## 2021-01-26 NOTE — PHYSICAL THERAPY INITIAL EVALUATION ADULT - PRECAUTIONS/LIMITATIONS, REHAB EVAL
burning pain over lower back over tailbone, associated with numbness off her left buttock and groin, numbness of left 4th-6th toe, worsened when seated upright or when walking, improved when lying on the side, not improved with rest. She presented to the hospital because it did not improve over the day, the pain was in a new location and more severe than previous, and the sensation of numbness was a new symptom.  (-) XR pelvis, (-) XR hips, MRI L-spine:  Large central through left subarticular zone disc extrusion at L5-S1 occupying nearly the entire cross-sectional area the spinal cord and severely effacing the thecal sac and compressing the cauda equina nerve roots. Multilevel spondylosis superimposed on congenital lumbar spinal canal narrowing with disc protrusion at L3-L4 resulting in moderate to severe central spinal canal stenosis and disc bulge at L4-L5

## 2021-01-26 NOTE — H&P ADULT - PROBLEM SELECTOR PLAN 1
Seen by Ortho Spine. CT lumbar spine documents significant stenosis. MR spine completed with read pending. Per Spine, for now dexamethasone 10mg IV every 6 hour.  - oxycodone 5mg every 4 hour as needed for moderate to severe pain, acetaminophen for mild pain  - patient reports trying gabapentin in the past but not tolerating. pending response to steroid maybe consider duloxetine vs another trial of gabapentin if patient is amenable.  - Per Ortho Spine, WBAT. PT/OT orders placed.

## 2021-01-26 NOTE — H&P ADULT - ASSESSMENT
35F w/ class III obesity s/p gastric sleeve(2019; 100lb weight loss), chronic lower back pain with sciatica, HTN, chronic urinary incontinence presents with back pain and numbness over buttocks, groin, and left foot after fall admitted to medicine for further management.

## 2021-01-26 NOTE — PROGRESS NOTE ADULT - PROBLEM SELECTOR PLAN 8
Hold ac as patient may require surgery if not improving  istop#: [id number]   01/02/2021 01/07/2021 oxycodone hcl 5 mg tablet  6 2 [facility] #[id number]

## 2021-01-26 NOTE — PROGRESS NOTE ADULT - PROBLEM SELECTOR PLAN 7
The patient could not remember all of her meds or dosing. will need day team to follow up The patient could not remember all of her meds or dosing. Followed up [facility] as patient may require surgery if not improving  istop#: [id number]   01/02/2021 01/07/2021 oxycodone hcl 5 mg tablet  6 2 [id number] DVT ppx: lovenox  istop#: [id number]   01/02/2021 01/07/2021 oxycodone hcl 5 mg tablet  6 2 [facility] #[id number]

## 2021-01-26 NOTE — PROGRESS NOTE ADULT - PROBLEM SELECTOR PLAN 3
c/w home losartan and HCTZ dosing s/p gastric sleeve  continue with vitamin supplementation - s/p gastric sleeve  - continue with vitamin supplementation - s/p gastric sleeve  - continue with vitamin supplementation  - f/u B12, HbA1c, TSH levels

## 2021-01-26 NOTE — H&P ADULT - ATTENDING COMMENTS
Prabhjot Jensen MD  Medicine Attending  Department of Hospital Medicine  pager: [phone] (available from 20:00 to 08:00)

## 2021-01-26 NOTE — H&P ADULT - NSHPLABSRESULTS_GEN_ALL_CORE
Personally reviewed available labs, imaging and ekg  CBC Full  -  ( 25 Jan 2021 18:26 )  WBC Count : 9.50 K/uL  RBC Count : 4.42 M/uL  Hemoglobin : 13.0 g/dL  Hematocrit : 37.7 %  Platelet Count - Automated : 214 K/uL  Mean Cell Volume : 85.3 fl  Mean Cell Hemoglobin : 29.4 pg  Mean Cell Hemoglobin Concentration : 34.5 gm/dL  Auto Neutrophil # : x  Auto Lymphocyte # : x  Auto Monocyte # : x  Auto Eosinophil # : x  Auto Basophil # : x  Auto Neutrophil % : x  Auto Lymphocyte % : x  Auto Monocyte % : x  Auto Eosinophil % : x  Auto Basophil % : x    01-25  141  |  104  |  17  ----------------------------<  86  4.2   |  25  |  0.60    Ca    9.1      25 Jan 2021 18:26  TPro  7.3  /  Alb  4.0  /  TBili  0.3  /  DBili  x   /  AST  12  /  ALT  10  /  AlkPhos  78  01-25    Imaging:  CT lumbar spine demonstrates lumbar stenosis  EKG: ordered for am Personally reviewed available labs, imaging and ekg  CBC Full  -  ( 25 Jan 2021 18:26 )  WBC Count : 9.50 K/uL  RBC Count : 4.42 M/uL  Hemoglobin : 13.0 g/dL  Hematocrit : 37.7 %  Platelet Count - Automated : 214 K/uL  Mean Cell Volume : 85.3 fl  Mean Cell Hemoglobin : 29.4 pg  Mean Cell Hemoglobin Concentration : 34.5 gm/dL  Auto Neutrophil # : x  Auto Lymphocyte # : x  Auto Monocyte # : x  Auto Eosinophil # : x  Auto Basophil # : x  Auto Neutrophil % : x  Auto Lymphocyte % : x  Auto Monocyte % : x  Auto Eosinophil % : x  Auto Basophil % : x    01-25  141  |  104  |  17  ----------------------------<  86  4.2   |  25  |  0.60    Ca    9.1      25 Jan 2021 18:26  TPro  7.3  /  Alb  4.0  /  TBili  0.3  /  DBili  x   /  AST  12  /  ALT  10  /  AlkPhos  78  01-25    Imaging:  CT lumbar spine demonstrates lumbar stenosis  EKG: NSR 79, NO st elevation c/w stemi present 2021/negative

## 2021-01-26 NOTE — H&P ADULT - HISTORY OF PRESENT ILLNESS
35F w/ class III obesity s/p gastric sleeve(2019; 100lb weight loss), chronic lower back pain with sciatica, HTN, chronic urinary incontinence presents with back pain and numbness over buttocks, groin, and left foot after fall. The patient reports that she had a mechanical fall in the hallway of her home the evening prior to presentation and landed on her left sided without headstrike. She reports instantaneously developing severe 10/10, burning pain over lower back over tailbone, associated with numbness off her left buttock and groin, numbness of left 4th-6th toe, worsened when seated upright or when walking, improved when lying on the side, not improved with rest. She presented to the hospital because it did not improve over the day, the pain was in a new location and more severe than previous, and the sensation of numbness was a new symptom. She denies paralysis of the limbs. She does have chronic urinary incontinence but did not have bowel incontinence. She denies fever, chills, CP, palpitations, sob, cough, n/v/d, or painful urination.    In the ED, VS 98.1, 179/95, 85, 20 99%RA  s/p acetaminophen 975x1, oxycodone 5mgx1

## 2021-01-26 NOTE — ED ADULT NURSE REASSESSMENT NOTE - NS ED NURSE REASSESS COMMENT FT1
Pt's /123, denies blurry vision & H/A. Pt attributes elevated BP to sensation of pain in buttock. Pt states that she took home Losartan this AM as prescribed. William ZUNIGA aware, recommends administering 5 mg oxycodone.

## 2021-01-26 NOTE — PROGRESS NOTE ADULT - PROBLEM SELECTOR PLAN 1
Seen by Ortho Spine. CT lumbar spine documents significant stenosis. MR spine demonstrates compression of cauda equina nerve roots at L5-S1. Per Spine, for now dexamethasone 10mg IV every 6 hour.  - oxycodone 5mg every 4 hour as needed for moderate to severe pain, acetaminophen for mild pain  - patient reports trying gabapentin in the past but not tolerating. pending response to steroid maybe consider duloxetine vs another trial of gabapentin if patient is amenable.  - Per Ortho Spine, WBAT. PT/OT orders placed. Seen by Ortho Spine. CT lumbar spine documents significant stenosis. MR spine demonstrates compression of cauda equina nerve roots at L5-S1. Per Spine, for now dexamethasone 10mg IV every 6 hour.  -will continue dexamethasone 10mg IV every 6hrs for 48 hrs  -amendable to surgery if not improving with medical therapy. will discuss with ortho  -PT/OT consulted Seen by Ortho Spine. CT lumbar spine documents significant stenosis. MR spine demonstrates compression of cauda equina nerve roots at L5-S1.   -Spine recs appreciated, patient preferring conservative mgt at this time   -will continue dexamethasone 10mg IV every 6hrs for 48 hrs  -amendable to surgery if not improving with medical therapy   -PT/OT consulted Seen by Ortho Spine. CT lumbar spine documents significant stenosis. MR spine demonstrates compression of cauda equina nerve roots at L5-S1.   -Spine ortho team recs appreciated, patient preferring conservative mgt at this time. Amendable to surgery if not improving with medical therapy   -will continue dexamethasone 10mg IV every 6hrs  -reluctant to try duloxetine, flexirol, gabapentin. will reconsult about trying these medications  -will give lidocaine patch for leg pain  -PT/OT consulted

## 2021-01-26 NOTE — PROVIDER CONTACT NOTE (OTHER) - REASON
Patient refused Labetalol IV ordered due to elevated BP
Blood Pressure elevated
patient blood pressure elevated

## 2021-01-26 NOTE — PROGRESS NOTE ADULT - PROBLEM SELECTOR PLAN 6
I spent 4 minutes with patient discussing tobacco use cessation. Patient counseled on available pharmacologic interventions.    Billing Code:[id number] The patient could not remember all of her meds or dosing. Followed up Med rec updated

## 2021-01-26 NOTE — PROGRESS NOTE ADULT - PROBLEM SELECTOR PLAN 2
seen by Spine-rectal tone present. MR spine consistent with cauda equina nerve root compression.   - treatment as above c/w home losartan and HCTZ dosing - c/w home losartan and HCTZ dosing

## 2021-01-26 NOTE — PROGRESS NOTE ADULT - ATTENDING COMMENTS
-Patient seen/examined on 1/26/21. Case/plan discussed with the medical student and house staff as reviewed by me above and in any comments below.  -Pain control. -PT.   -BP control. Had to give IV hydralazine earlier. Suspect part of BP elevation due to pain and the steroids. Also, since patient is obese, cuff size likely too small and BP readings may be artificially increased. Should use a thigh cuff, but the floor nurse didn't have one.

## 2021-01-26 NOTE — PROGRESS NOTE ADULT - SUBJECTIVE AND OBJECTIVE BOX
INTERVAL HPI/OVERNIGHT EVENTS:  35F w/ class III obesity s/p gastric sleeve(2019; 100lb weight loss), chronic lower back pain with sciatica, HTN, chronic urinary incontinence presents with back pain and numbness over buttocks, groin, and left foot after fall.    Overnight:       REVIEW OF SYSTEMS:  CONSTITUTIONAL: No fever, weight loss, or fatigue  EYES: No eye pain, visual disturbances, or discharge  ENMT:  No difficulty hearing, tinnitus, vertigo; No sinus or throat pain  NECK: No pain or stiffness  BREASTS: No pain, masses, or nipple discharge  RESPIRATORY: No cough, wheezing, chills or hemoptysis; No shortness of breath  CARDIOVASCULAR: No chest pain, palpitations, dizziness, or leg swelling  GASTROINTESTINAL: No abdominal or epigastric pain. No nausea, vomiting, or hematemesis; No diarrhea or constipation. No melena or hematochezia.  GENITOURINARY: No dysuria, frequency, hematuria, or incontinence  NEUROLOGICAL: No headaches, memory loss, loss of strength, numbness, or tremors  SKIN: No itching, burning, rashes, or lesions   LYMPH NODES: No enlarged glands  ENDOCRINE: No heat or cold intolerance; No hair loss  MUSCULOSKELETAL: No joint pain or swelling; No muscle, back, or extremity pain  PSYCHIATRIC: No depression, anxiety, mood swings, or difficulty sleeping  HEME/LYMPH: No easy bruising, or bleeding gums  ALLERY AND IMMUNOLOGIC: No hives or eczema    T(C): 36.6 (01-26-21 @ 05:15), Max: 36.7 (01-25-21 @ 15:59)  HR: 78 (01-26-21 @ 05:15) (78 - 85)  BP: 141/90 (01-26-21 @ 05:15) (141/90 - 188/123)  RR: 20 (01-26-21 @ 05:15) (20 - 24)  SpO2: 97% (01-26-21 @ 05:15) (97% - 100%)  Wt(kg): --Vital Signs Last 24 Hrs  T(C): 36.6 (26 Jan 2021 05:15), Max: 36.7 (25 Jan 2021 15:59)  T(F): 97.8 (26 Jan 2021 05:15), Max: 98.1 (25 Jan 2021 15:59)  HR: 78 (26 Jan 2021 05:15) (78 - 85)  BP: 141/90 (26 Jan 2021 05:15) (141/90 - 188/123)  BP(mean): 124 (26 Jan 2021 00:13) (124 - 141)  RR: 20 (26 Jan 2021 05:15) (20 - 24)  SpO2: 97% (26 Jan 2021 05:15) (97% - 100%)    PHYSICAL EXAM:  GENERAL: NAD, well-groomed, well-developed  HEAD:  Atraumatic, Normocephalic  EYES: EOMI, PERRLA, conjunctiva and sclera clear  ENMT: No tonsillar erythema, exudates, or enlargement; Moist mucous membranes, Good dentition, No lesions  NECK: Supple, No JVD, Normal thyroid  NERVOUS SYSTEM:  Alert & Oriented X3, Good concentration; Motor Strength 5/5 B/L upper and lower extremities; DTRs 2+ intact and symmetric  CHEST/LUNG: Clear to percussion bilaterally; No rales, rhonchi, wheezing, or rubs  HEART: Regular rate and rhythm; No murmurs, rubs, or gallops  ABDOMEN: Soft, Nontender, Nondistended; Bowel sounds present  EXTREMITIES:  2+ Peripheral Pulses, No clubbing, cyanosis, or edema  LYMPH: No lymphadenopathy noted  SKIN: No rashes or lesions      LABS:                          13.0   9.50  )-----------( 214      ( 25 Jan 2021 18:26 )             37.7   01-25    141  |  104  |  17  ----------------------------<  86  4.2   |  25  |  0.60    Ca    9.1      25 Jan 2021 18:26    TPro  7.3  /  Alb  4.0  /  TBili  0.3  /  DBili  x   /  AST  12  /  ALT  10  /  AlkPhos  78  01-25        RADIOLOGY & ADDITIONAL TESTS:  < from: MR Lumbar Spine No Cont (01.25.21 @ 22:48) >  FINDINGS:  Lumbar alignment is maintained. Vertebral bodies are normal in height and signal without acute fracture or traumatic subluxation. There is mild grade 1 degenerative retrolisthesis of L5 on S1. There is disc desiccation at L3-L4 through L5-S1, as well as mild disc space narrowing at L5-S1.    There is congenital lumbar spinal canal stenosis.    Evaluation of individual levels demonstrates:    L5-S1: Large central, left central, and subarticular disc extrusion severely effacing the central spinal canal and compressing the cauda equina nerve roots. Moderate left and mild right neural foraminal narrowing. Mild right and minimal left facet joint effusions.    L4-L5: Mild disc bulge and small central annular fissure. Moderate central spinal canal stenosis. Mild bilateral facet hypertrophy and mild bilateral neural foraminal narrowing.    L3-L4: Disc bulge and superimposed central and right central disc protrusion with moderate to severe central spinal canal stenosis, as well as mild right neural foraminal narrowing. No significant left neural foraminal narrowing. Mild bilateral facet hypertrophy with minimal left facet joint effusion.    L2-L3: No significant disc herniation or neural foraminal narrowing. Mild to moderate central spinal canal stenosis.    L1-L2: No significant disc herniation, central spinal canal stenosis, or neural foraminal narrowing.    The conus is normal in position and morphology at the T12-L1 level.    There is trace nonspecific free fluid in the pelvis, likely physiologic. There is no definite fluid collection or mass lesion within the visualized retroperitoneal or posterior paraspinal soft tissues.    IMPRESSION:    Large central through left subarticular zone disc extrusion at L5-S1 occupying nearly the entire cross-sectional area the spinal cord and severely effacing the thecal sac and compressing the cauda equina nerve roots.    Additional multilevel spondylosis superimposed on congenital lumbar spinal canal narrowing, as described, with disc protrusion at L3-L4 resulting in moderate to severe central spinal canal stenosis and disc bulge at L4-L5 resulting in moderate central spinal canal stenosis.    < end of copied text >      Imaging Personally Reviewed:  [ ] YES  [ ] NO   INTERVAL HPI/OVERNIGHT EVENTS:  35F w/ class III obesity s/p gastric sleeve(2019; 100lb weight loss), chronic lower back pain with sciatica, HTN, chronic urinary incontinence presents with back pain and numbness over buttocks, groin, and left foot after fall.    Overnight: Pt says pain has improved with oxycodone. Still has numbness and burning/tingling in the left hip, left foot and toes. Says left leg feel heavy and has difficulty moving left toes. Endorses chronic urinary incontinence which is unchanged. Denies bowel incontinence.       REVIEW OF SYSTEMS:  CONSTITUTIONAL: No fever, weight loss, or fatigue  EYES: No eye pain, visual disturbances, or discharge  ENMT:  No difficulty hearing, tinnitus, vertigo; No sinus or throat pain  NECK: No pain or stiffness  RESPIRATORY: No cough, wheezing, chills or hemoptysis; No shortness of breath  CARDIOVASCULAR: No chest pain, palpitations, dizziness, or leg swelling  GASTROINTESTINAL: No abdominal or epigastric pain. No nausea, vomiting, or hematemesis; No diarrhea. +constipation. No melena or hematochezia.  GENITOURINARY: No dysuria, frequency, hematuria, or incontinence  NEUROLOGICAL: No headaches, memory loss, loss of strength, numbness, or tremors  SKIN: No itching, burning, rashes, or lesions   MUSCULOSKELETAL: No joint pain or swelling; back pain improved. left hip, leg, foot numbness, tingling, burning.  PSYCHIATRIC: No depression, anxiety, mood swings, or difficulty sleeping  HEME/LYMPH: No easy bruising, or bleeding gums    T(C): 36.6 (01-26-21 @ 05:15), Max: 36.7 (01-25-21 @ 15:59)  HR: 78 (01-26-21 @ 05:15) (78 - 85)  BP: 141/90 (01-26-21 @ 05:15) (141/90 - 188/123)  RR: 20 (01-26-21 @ 05:15) (20 - 24)  SpO2: 97% (01-26-21 @ 05:15) (97% - 100%)  Wt(kg): --Vital Signs Last 24 Hrs  T(C): 36.6 (26 Jan 2021 05:15), Max: 36.7 (25 Jan 2021 15:59)  T(F): 97.8 (26 Jan 2021 05:15), Max: 98.1 (25 Jan 2021 15:59)  HR: 78 (26 Jan 2021 05:15) (78 - 85)  BP: 141/90 (26 Jan 2021 05:15) (141/90 - 188/123)  BP(mean): 124 (26 Jan 2021 00:13) (124 - 141)  RR: 20 (26 Jan 2021 05:15) (20 - 24)  SpO2: 97% (26 Jan 2021 05:15) (97% - 100%)    PHYSICAL EXAM:  GENERAL: NAD, well-groomed, well-developed  HEAD:  Atraumatic, Normocephalic  EYES: EOMI, PERRLA, conjunctiva and sclera clear  ENMT: No tonsillar erythema, exudates, or enlargement; Moist mucous membranes, Good dentition, No lesions  NECK: Supple, No JVD, Normal thyroid  NERVOUS SYSTEM:  Alert & Oriented X3, Good concentration; Motor Strength 5/5 B/L upper and 4/5 left lower extremity 5/5 right lower extremity; DTRs 2+ intact and symmetric. Reported numbness over palpation of left hip and left foot and toes. Decreased motor ability of left toes.  CHEST/LUNG: Clear to percussion bilaterally; No rales, rhonchi, wheezing, or rubs  HEART: Regular rate and rhythm; No murmurs, rubs, or gallops  ABDOMEN: Soft, Nontender, Nondistended; Bowel sounds present  EXTREMITIES:  2+ Peripheral Pulses, No clubbing, cyanosis, or edema  LYMPH: No lymphadenopathy noted  SKIN: No rashes or lesions      LABS:                          13.0   9.50  )-----------( 214      ( 25 Jan 2021 18:26 )             37.7   01-25    141  |  104  |  17  ----------------------------<  86  4.2   |  25  |  0.60    Ca    9.1      25 Jan 2021 18:26    TPro  7.3  /  Alb  4.0  /  TBili  0.3  /  DBili  x   /  AST  12  /  ALT  10  /  AlkPhos  78  01-25        RADIOLOGY & ADDITIONAL TESTS:  < from: MR Lumbar Spine No Cont (01.25.21 @ 22:48) >  FINDINGS:  Lumbar alignment is maintained. Vertebral bodies are normal in height and signal without acute fracture or traumatic subluxation. There is mild grade 1 degenerative retrolisthesis of L5 on S1. There is disc desiccation at L3-L4 through L5-S1, as well as mild disc space narrowing at L5-S1.    There is congenital lumbar spinal canal stenosis.    Evaluation of individual levels demonstrates:    L5-S1: Large central, left central, and subarticular disc extrusion severely effacing the central spinal canal and compressing the cauda equina nerve roots. Moderate left and mild right neural foraminal narrowing. Mild right and minimal left facet joint effusions.    L4-L5: Mild disc bulge and small central annular fissure. Moderate central spinal canal stenosis. Mild bilateral facet hypertrophy and mild bilateral neural foraminal narrowing.    L3-L4: Disc bulge and superimposed central and right central disc protrusion with moderate to severe central spinal canal stenosis, as well as mild right neural foraminal narrowing. No significant left neural foraminal narrowing. Mild bilateral facet hypertrophy with minimal left facet joint effusion.    L2-L3: No significant disc herniation or neural foraminal narrowing. Mild to moderate central spinal canal stenosis.    L1-L2: No significant disc herniation, central spinal canal stenosis, or neural foraminal narrowing.    The conus is normal in position and morphology at the T12-L1 level.    There is trace nonspecific free fluid in the pelvis, likely physiologic. There is no definite fluid collection or mass lesion within the visualized retroperitoneal or posterior paraspinal soft tissues.    IMPRESSION:    Large central through left subarticular zone disc extrusion at L5-S1 occupying nearly the entire cross-sectional area the spinal cord and severely effacing the thecal sac and compressing the cauda equina nerve roots.    Additional multilevel spondylosis superimposed on congenital lumbar spinal canal narrowing, as described, with disc protrusion at L3-L4 resulting in moderate to severe central spinal canal stenosis and disc bulge at L4-L5 resulting in moderate central spinal canal stenosis.    < end of copied text >      Imaging Personally Reviewed:  [ ] YES  [ ] NO   INTERVAL HPI/OVERNIGHT EVENTS:  35F w/ class III obesity s/p gastric sleeve(2019; 100lb weight loss), chronic lower back pain with sciatica, HTN, chronic urinary incontinence presents with back pain and numbness over buttocks, groin, and left foot after fall.    Overnight: Pt says pain has improved with oxycodone currently 5/10 down from 12/10. Still has numbness and burning/tingling in the left hip, left foot and toes. Says left leg feel heavy and has difficulty moving left toes. Endorses chronic urinary incontinence which is unchanged. Denies bowel incontinence.       REVIEW OF SYSTEMS:  CONSTITUTIONAL: No fever, weight loss, or fatigue  EYES: No eye pain, visual disturbances, or discharge  ENMT:  No difficulty hearing, tinnitus, vertigo; No sinus or throat pain  NECK: No pain or stiffness  RESPIRATORY: No cough, wheezing, chills or hemoptysis; No shortness of breath  CARDIOVASCULAR: No chest pain, palpitations, dizziness, or leg swelling  GASTROINTESTINAL: No abdominal or epigastric pain. No nausea, vomiting, or hematemesis; No diarrhea. +constipation. No melena or hematochezia.  GENITOURINARY: No dysuria, frequency, hematuria, or incontinence  NEUROLOGICAL: No headaches, memory loss, loss of strength, numbness, or tremors  SKIN: No itching, burning, rashes, or lesions   MUSCULOSKELETAL: No joint pain or swelling; back pain improved. left hip, leg, foot numbness, tingling, burning.  PSYCHIATRIC: No depression, anxiety, mood swings, or difficulty sleeping  HEME/LYMPH: No easy bruising, or bleeding gums    T(C): 36.6 (01-26-21 @ 05:15), Max: 36.7 (01-25-21 @ 15:59)  HR: 78 (01-26-21 @ 05:15) (78 - 85)  BP: 141/90 (01-26-21 @ 05:15) (141/90 - 188/123)  RR: 20 (01-26-21 @ 05:15) (20 - 24)  SpO2: 97% (01-26-21 @ 05:15) (97% - 100%)  Wt(kg): --Vital Signs Last 24 Hrs  T(C): 36.6 (26 Jan 2021 05:15), Max: 36.7 (25 Jan 2021 15:59)  T(F): 97.8 (26 Jan 2021 05:15), Max: 98.1 (25 Jan 2021 15:59)  HR: 78 (26 Jan 2021 05:15) (78 - 85)  BP: 141/90 (26 Jan 2021 05:15) (141/90 - 188/123)  BP(mean): 124 (26 Jan 2021 00:13) (124 - 141)  RR: 20 (26 Jan 2021 05:15) (20 - 24)  SpO2: 97% (26 Jan 2021 05:15) (97% - 100%)    PHYSICAL EXAM:  GENERAL: NAD, well-groomed, well-developed  HEAD:  Atraumatic, Normocephalic  EYES: EOMI, PERRLA, conjunctiva and sclera clear  ENMT: No tonsillar erythema, exudates, or enlargement; Moist mucous membranes, Good dentition, No lesions  NECK: Supple, No JVD, Normal thyroid  NERVOUS SYSTEM:  Alert & Oriented X3, Good concentration; Motor Strength 5/5 B/L upper and 4/5 left lower extremity 5/5 right lower extremity; DTRs 2+ intact and symmetric. Reported numbness over palpation of left hip and left foot and toes. Decreased motor ability of left toes.  CHEST/LUNG: Clear to percussion bilaterally; No rales, rhonchi, wheezing, or rubs  HEART: Regular rate and rhythm; No murmurs, rubs, or gallops  ABDOMEN: Soft, Nontender, Nondistended; Bowel sounds present  EXTREMITIES:  2+ Peripheral Pulses, No clubbing, cyanosis, or edema  LYMPH: No lymphadenopathy noted  SKIN: No rashes or lesions      LABS:                          13.0   9.50  )-----------( 214      ( 25 Jan 2021 18:26 )             37.7   01-25    141  |  104  |  17  ----------------------------<  86  4.2   |  25  |  0.60    Ca    9.1      25 Jan 2021 18:26    TPro  7.3  /  Alb  4.0  /  TBili  0.3  /  DBili  x   /  AST  12  /  ALT  10  /  AlkPhos  78  01-25        RADIOLOGY & ADDITIONAL TESTS:  < from: MR Lumbar Spine No Cont (01.25.21 @ 22:48) >  FINDINGS:  Lumbar alignment is maintained. Vertebral bodies are normal in height and signal without acute fracture or traumatic subluxation. There is mild grade 1 degenerative retrolisthesis of L5 on S1. There is disc desiccation at L3-L4 through L5-S1, as well as mild disc space narrowing at L5-S1.    There is congenital lumbar spinal canal stenosis.    Evaluation of individual levels demonstrates:    L5-S1: Large central, left central, and subarticular disc extrusion severely effacing the central spinal canal and compressing the cauda equina nerve roots. Moderate left and mild right neural foraminal narrowing. Mild right and minimal left facet joint effusions.    L4-L5: Mild disc bulge and small central annular fissure. Moderate central spinal canal stenosis. Mild bilateral facet hypertrophy and mild bilateral neural foraminal narrowing.    L3-L4: Disc bulge and superimposed central and right central disc protrusion with moderate to severe central spinal canal stenosis, as well as mild right neural foraminal narrowing. No significant left neural foraminal narrowing. Mild bilateral facet hypertrophy with minimal left facet joint effusion.    L2-L3: No significant disc herniation or neural foraminal narrowing. Mild to moderate central spinal canal stenosis.    L1-L2: No significant disc herniation, central spinal canal stenosis, or neural foraminal narrowing.    The conus is normal in position and morphology at the T12-L1 level.    There is trace nonspecific free fluid in the pelvis, likely physiologic. There is no definite fluid collection or mass lesion within the visualized retroperitoneal or posterior paraspinal soft tissues.    IMPRESSION:    Large central through left subarticular zone disc extrusion at L5-S1 occupying nearly the entire cross-sectional area the spinal cord and severely effacing the thecal sac and compressing the cauda equina nerve roots.    Additional multilevel spondylosis superimposed on congenital lumbar spinal canal narrowing, as described, with disc protrusion at L3-L4 resulting in moderate to severe central spinal canal stenosis and disc bulge at L4-L5 resulting in moderate central spinal canal stenosis.    < end of copied text >      Imaging Personally Reviewed:  [ ] YES  [ ] NO   Giovanni Tejada  Internal Medicine Acting Intern MS4  [phone]    INTERVAL HPI/OVERNIGHT EVENTS:  35F w/ class III obesity s/p gastric sleeve(2019; 100lb weight loss), chronic lower back pain with sciatica, HTN, chronic urinary incontinence presents with back pain and numbness over buttocks, groin, and left foot after fall.    Overnight: Pt says pain has improved with oxycodone currently 5/10 down from 12/10. Still has numbness and burning/tingling in the left hip, left foot and toes. Says left leg feel heavy and has difficulty moving left toes. Endorses chronic urinary incontinence which is unchanged. Denies bowel incontinence.       REVIEW OF SYSTEMS:  CONSTITUTIONAL: No fever, weight loss, or fatigue  EYES: No eye pain, visual disturbances, or discharge  ENMT:  No difficulty hearing, tinnitus, vertigo; No sinus or throat pain  NECK: No pain or stiffness  RESPIRATORY: No cough, wheezing, chills or hemoptysis; No shortness of breath  CARDIOVASCULAR: No chest pain, palpitations, dizziness, or leg swelling  GASTROINTESTINAL: No abdominal or epigastric pain. No nausea, vomiting, or hematemesis; No diarrhea. +constipation. No melena or hematochezia.  GENITOURINARY: No dysuria, frequency, hematuria, or incontinence  NEUROLOGICAL: No headaches, memory loss, loss of strength, numbness, or tremors  SKIN: No itching, burning, rashes, or lesions   MUSCULOSKELETAL: No joint pain or swelling; back pain improved. left hip, leg, foot numbness, tingling, burning.  PSYCHIATRIC: No depression, anxiety, mood swings, or difficulty sleeping  HEME/LYMPH: No easy bruising, or bleeding gums    T(C): 36.6 (01-26-21 @ 05:15), Max: 36.7 (01-25-21 @ 15:59)  HR: 78 (01-26-21 @ 05:15) (78 - 85)  BP: 141/90 (01-26-21 @ 05:15) (141/90 - 188/123)  RR: 20 (01-26-21 @ 05:15) (20 - 24)  SpO2: 97% (01-26-21 @ 05:15) (97% - 100%)  Wt(kg): --Vital Signs Last 24 Hrs  T(C): 36.6 (26 Jan 2021 05:15), Max: 36.7 (25 Jan 2021 15:59)  T(F): 97.8 (26 Jan 2021 05:15), Max: 98.1 (25 Jan 2021 15:59)  HR: 78 (26 Jan 2021 05:15) (78 - 85)  BP: 141/90 (26 Jan 2021 05:15) (141/90 - 188/123)  BP(mean): 124 (26 Jan 2021 00:13) (124 - 141)  RR: 20 (26 Jan 2021 05:15) (20 - 24)  SpO2: 97% (26 Jan 2021 05:15) (97% - 100%)    PHYSICAL EXAM:  GENERAL: NAD, well-groomed, well-developed  HEAD:  Atraumatic, Normocephalic  EYES: EOMI, PERRLA, conjunctiva and sclera clear  ENMT: No tonsillar erythema, exudates, or enlargement; Moist mucous membranes, Good dentition, No lesions  NECK: Supple, No JVD, Normal thyroid  NERVOUS SYSTEM:  Alert & Oriented X3, Good concentration; Motor Strength 5/5 B/L upper and 4/5 left lower extremity 5/5 right lower extremity; DTRs 2+ intact and symmetric. Reported numbness over palpation of left hip and left foot and toes. Decreased motor ability of left toes.  CHEST/LUNG: Clear to percussion bilaterally; No rales, rhonchi, wheezing, or rubs  HEART: Regular rate and rhythm; No murmurs, rubs, or gallops  ABDOMEN: Soft, Nontender, Nondistended; Bowel sounds present  EXTREMITIES:  2+ Peripheral Pulses, No clubbing, cyanosis, or edema  LYMPH: No lymphadenopathy noted  SKIN: No rashes or lesions      LABS:                          13.0   9.50  )-----------( 214      ( 25 Jan 2021 18:26 )             37.7   01-25    141  |  104  |  17  ----------------------------<  86  4.2   |  25  |  0.60    Ca    9.1      25 Jan 2021 18:26    TPro  7.3  /  Alb  4.0  /  TBili  0.3  /  DBili  x   /  AST  12  /  ALT  10  /  AlkPhos  78  01-25        RADIOLOGY & ADDITIONAL TESTS:  < from: MR Lumbar Spine No Cont (01.25.21 @ 22:48) >  FINDINGS:  Lumbar alignment is maintained. Vertebral bodies are normal in height and signal without acute fracture or traumatic subluxation. There is mild grade 1 degenerative retrolisthesis of L5 on S1. There is disc desiccation at L3-L4 through L5-S1, as well as mild disc space narrowing at L5-S1.    There is congenital lumbar spinal canal stenosis.    Evaluation of individual levels demonstrates:    L5-S1: Large central, left central, and subarticular disc extrusion severely effacing the central spinal canal and compressing the cauda equina nerve roots. Moderate left and mild right neural foraminal narrowing. Mild right and minimal left facet joint effusions.    L4-L5: Mild disc bulge and small central annular fissure. Moderate central spinal canal stenosis. Mild bilateral facet hypertrophy and mild bilateral neural foraminal narrowing.    L3-L4: Disc bulge and superimposed central and right central disc protrusion with moderate to severe central spinal canal stenosis, as well as mild right neural foraminal narrowing. No significant left neural foraminal narrowing. Mild bilateral facet hypertrophy with minimal left facet joint effusion.    L2-L3: No significant disc herniation or neural foraminal narrowing. Mild to moderate central spinal canal stenosis.    L1-L2: No significant disc herniation, central spinal canal stenosis, or neural foraminal narrowing.    The conus is normal in position and morphology at the T12-L1 level.    There is trace nonspecific free fluid in the pelvis, likely physiologic. There is no definite fluid collection or mass lesion within the visualized retroperitoneal or posterior paraspinal soft tissues.    IMPRESSION:    Large central through left subarticular zone disc extrusion at L5-S1 occupying nearly the entire cross-sectional area the spinal cord and severely effacing the thecal sac and compressing the cauda equina nerve roots.    Additional multilevel spondylosis superimposed on congenital lumbar spinal canal narrowing, as described, with disc protrusion at L3-L4 resulting in moderate to severe central spinal canal stenosis and disc bulge at L4-L5 resulting in moderate central spinal canal stenosis.    < end of copied text >      Imaging Personally Reviewed:  [ ] YES  [ ] NO   Giovanni Tejada  Internal Medicine Acting Intern MS4  [phone]    INTERVAL HPI/OVERNIGHT EVENTS:  35F w/ class III obesity s/p gastric sleeve(2019; 100lb weight loss), chronic lower back pain with sciatica, HTN, chronic urinary incontinence presents with back pain and numbness over buttocks, groin, and left foot after fall.    Overnight: Pt says pain has improved with oxycodone currently 5/10 down from 12/10. Still has numbness and burning/tingling in the left hip, left foot and toes. Says left leg feels heavy and has weakness of left toes. Endorses chronic urinary incontinence which is unchanged. Denies bowel incontinence.       REVIEW OF SYSTEMS:  CONSTITUTIONAL: No fever, weight loss, or fatigue  EYES: No eye pain, visual disturbances, or discharge  ENMT:  No difficulty hearing, tinnitus, vertigo; No sinus or throat pain  NECK: No pain or stiffness  RESPIRATORY: No cough, wheezing, chills or hemoptysis; No shortness of breath  CARDIOVASCULAR: No chest pain, palpitations, dizziness, or leg swelling  GASTROINTESTINAL: No abdominal or epigastric pain. No nausea, vomiting, or hematemesis; No diarrhea. +constipation. No melena or hematochezia.  GENITOURINARY: No dysuria, frequency, hematuria, or incontinence  NEUROLOGICAL: No headaches, memory loss, loss of strength, numbness, or tremors  SKIN: No itching, burning, rashes, or lesions   MUSCULOSKELETAL: No joint pain or swelling; back pain improved. left hip, leg, foot numbness, tingling, burning.  PSYCHIATRIC: No depression, anxiety, mood swings, or difficulty sleeping  HEME/LYMPH: No easy bruising, or bleeding gums    T(C): 36.6 (01-26-21 @ 05:15), Max: 36.7 (01-25-21 @ 15:59)  HR: 78 (01-26-21 @ 05:15) (78 - 85)  BP: 141/90 (01-26-21 @ 05:15) (141/90 - 188/123)  RR: 20 (01-26-21 @ 05:15) (20 - 24)  SpO2: 97% (01-26-21 @ 05:15) (97% - 100%)  Wt(kg): --Vital Signs Last 24 Hrs  T(C): 36.6 (26 Jan 2021 05:15), Max: 36.7 (25 Jan 2021 15:59)  T(F): 97.8 (26 Jan 2021 05:15), Max: 98.1 (25 Jan 2021 15:59)  HR: 78 (26 Jan 2021 05:15) (78 - 85)  BP: 141/90 (26 Jan 2021 05:15) (141/90 - 188/123)  BP(mean): 124 (26 Jan 2021 00:13) (124 - 141)  RR: 20 (26 Jan 2021 05:15) (20 - 24)  SpO2: 97% (26 Jan 2021 05:15) (97% - 100%)    PHYSICAL EXAM:  GENERAL: NAD, well-groomed, well-developed  HEAD:  Atraumatic, Normocephalic  EYES: EOMI, PERRLA, conjunctiva and sclera clear  ENMT: No tonsillar erythema, exudates, or enlargement; Moist mucous membranes, Good dentition, No lesions  NECK: Supple, No JVD, Normal thyroid  NERVOUS SYSTEM:  Alert & Oriented X3, Good concentration; Motor Strength 5/5 B/L upper and 4/5 left lower extremity 5/5 right lower extremity; DTRs 2+ intact and symmetric. Reported numbness over palpation of left hip and left foot and toes. Decreased motor ability of left toes.  CHEST/LUNG: Clear to percussion bilaterally; No rales, rhonchi, wheezing, or rubs  HEART: Regular rate and rhythm; No murmurs, rubs, or gallops  ABDOMEN: Soft, Nontender, Nondistended; Bowel sounds present  EXTREMITIES:  2+ Peripheral Pulses, No clubbing, cyanosis, or edema  LYMPH: No lymphadenopathy noted  SKIN: No rashes or lesions      LABS:                          13.0   9.50  )-----------( 214      ( 25 Jan 2021 18:26 )             37.7   01-25    141  |  104  |  17  ----------------------------<  86  4.2   |  25  |  0.60    Ca    9.1      25 Jan 2021 18:26    TPro  7.3  /  Alb  4.0  /  TBili  0.3  /  DBili  x   /  AST  12  /  ALT  10  /  AlkPhos  78  01-25        RADIOLOGY & ADDITIONAL TESTS:  < from: MR Lumbar Spine No Cont (01.25.21 @ 22:48) >  FINDINGS:  Lumbar alignment is maintained. Vertebral bodies are normal in height and signal without acute fracture or traumatic subluxation. There is mild grade 1 degenerative retrolisthesis of L5 on S1. There is disc desiccation at L3-L4 through L5-S1, as well as mild disc space narrowing at L5-S1.    There is congenital lumbar spinal canal stenosis.    Evaluation of individual levels demonstrates:    L5-S1: Large central, left central, and subarticular disc extrusion severely effacing the central spinal canal and compressing the cauda equina nerve roots. Moderate left and mild right neural foraminal narrowing. Mild right and minimal left facet joint effusions.    L4-L5: Mild disc bulge and small central annular fissure. Moderate central spinal canal stenosis. Mild bilateral facet hypertrophy and mild bilateral neural foraminal narrowing.    L3-L4: Disc bulge and superimposed central and right central disc protrusion with moderate to severe central spinal canal stenosis, as well as mild right neural foraminal narrowing. No significant left neural foraminal narrowing. Mild bilateral facet hypertrophy with minimal left facet joint effusion.    L2-L3: No significant disc herniation or neural foraminal narrowing. Mild to moderate central spinal canal stenosis.    L1-L2: No significant disc herniation, central spinal canal stenosis, or neural foraminal narrowing.    The conus is normal in position and morphology at the T12-L1 level.    There is trace nonspecific free fluid in the pelvis, likely physiologic. There is no definite fluid collection or mass lesion within the visualized retroperitoneal or posterior paraspinal soft tissues.    IMPRESSION:    Large central through left subarticular zone disc extrusion at L5-S1 occupying nearly the entire cross-sectional area the spinal cord and severely effacing the thecal sac and compressing the cauda equina nerve roots.    Additional multilevel spondylosis superimposed on congenital lumbar spinal canal narrowing, as described, with disc protrusion at L3-L4 resulting in moderate to severe central spinal canal stenosis and disc bulge at L4-L5 resulting in moderate central spinal canal stenosis.    < end of copied text >    FINDINGS:    There is no evidence of acute fracture or dislocation. The joint spaces are maintained. The soft tissues are unremarkable.    IMPRESSION:    No evidence of acute fracture or dislocation.    Imaging Personally Reviewed:  [ ] YES  [ ] NO   Giovanni Tejada  Internal Medicine Acting Intern MS4  [phone]    INTERVAL HPI/OVERNIGHT EVENTS:  35F w/ class III obesity s/p gastric sleeve(2019; 100lb weight loss), chronic lower back pain with sciatica, HTN, chronic urinary incontinence presents with back pain and numbness over buttocks, groin, and left foot after fall.    Overnight: Pt says pain has improved with oxycodone currently 5/10 down from 12/10. Still has numbness and burning/tingling in the left hip, left foot and toes. Says left leg feels heavy and has weakness of left toes. Endorses chronic urinary incontinence which is unchanged. Denies bowel incontinence.       REVIEW OF SYSTEMS:  CONSTITUTIONAL: No fever, weight loss, or fatigue  EYES: No eye pain, visual disturbances, or discharge  ENMT:  No difficulty hearing, tinnitus, vertigo; No sinus or throat pain  NECK: No pain or stiffness  RESPIRATORY: No cough, wheezing, chills or hemoptysis; No shortness of breath  CARDIOVASCULAR: No chest pain, palpitations, dizziness, or leg swelling  GASTROINTESTINAL: No abdominal or epigastric pain. No nausea, vomiting, or hematemesis; No diarrhea. +constipation. No melena or hematochezia.  GENITOURINARY: No dysuria, frequency, hematuria, or incontinence  NEUROLOGICAL: No headaches, memory loss, loss of strength, numbness, or tremors  SKIN: No itching, burning, rashes, or lesions   MUSCULOSKELETAL: No joint pain or swelling; back pain improved. left hip, leg, foot numbness, tingling, burning.  PSYCHIATRIC: No depression, anxiety, mood swings, or difficulty sleeping  HEME/LYMPH: No easy bruising, or bleeding gums    T(C): 36.6 (01-26-21 @ 05:15), Max: 36.7 (01-25-21 @ 15:59)  HR: 78 (01-26-21 @ 05:15) (78 - 85)  BP: 141/90 (01-26-21 @ 05:15) (141/90 - 188/123)  RR: 20 (01-26-21 @ 05:15) (20 - 24)  SpO2: 97% (01-26-21 @ 05:15) (97% - 100%)  Wt(kg): --Vital Signs Last 24 Hrs  T(C): 36.6 (26 Jan 2021 05:15), Max: 36.7 (25 Jan 2021 15:59)  T(F): 97.8 (26 Jan 2021 05:15), Max: 98.1 (25 Jan 2021 15:59)  HR: 78 (26 Jan 2021 05:15) (78 - 85)  BP: 141/90 (26 Jan 2021 05:15) (141/90 - 188/123)  BP(mean): 124 (26 Jan 2021 00:13) (124 - 141)  RR: 20 (26 Jan 2021 05:15) (20 - 24)  SpO2: 97% (26 Jan 2021 05:15) (97% - 100%)    PHYSICAL EXAM:  GENERAL: NAD, well-groomed, well-developed  HEAD:  Atraumatic, Normocephalic  EYES: EOMI, PERRLA, conjunctiva and sclera clear  ENMT: No tonsillar erythema, exudates, or enlargement; Moist mucous membranes, Good dentition, No lesions  NECK: Supple, No JVD, Normal thyroid  CHEST/LUNG: Clear to percussion bilaterally; No rales, rhonchi, wheezing, or rubs  HEART: Regular rate and rhythm; No murmurs, rubs, or gallops  ABDOMEN: Soft, Nontender, Nondistended; Bowel sounds present  EXTREMITIES:  2+ Peripheral Pulses, No clubbing, cyanosis, or edema  LYMPH: No lymphadenopathy noted  SKIN: No rashes or lesions  NERVOUS SYSTEM:  Alert & Oriented X3, Good concentration; Motor Strength 5/5 B/L upper and 4/5 left lower extremity 5/5 right lower extremity; DTRs 2+ intact and symmetric. Achilles reflex 1+ bl. Reported numbness over palpation of left hip and left foot and toes. Decreased motor ability of left toes. Anal wink reflex intact       LABS:                          13.0   9.50  )-----------( 214      ( 25 Jan 2021 18:26 )             37.7   01-25    141  |  104  |  17  ----------------------------<  86  4.2   |  25  |  0.60    Ca    9.1      25 Jan 2021 18:26    TPro  7.3  /  Alb  4.0  /  TBili  0.3  /  DBili  x   /  AST  12  /  ALT  10  /  AlkPhos  78  01-25        RADIOLOGY & ADDITIONAL TESTS:  < from: MR Lumbar Spine No Cont (01.25.21 @ 22:48) >  FINDINGS:  Lumbar alignment is maintained. Vertebral bodies are normal in height and signal without acute fracture or traumatic subluxation. There is mild grade 1 degenerative retrolisthesis of L5 on S1. There is disc desiccation at L3-L4 through L5-S1, as well as mild disc space narrowing at L5-S1.    There is congenital lumbar spinal canal stenosis.    Evaluation of individual levels demonstrates:    L5-S1: Large central, left central, and subarticular disc extrusion severely effacing the central spinal canal and compressing the cauda equina nerve roots. Moderate left and mild right neural foraminal narrowing. Mild right and minimal left facet joint effusions.    L4-L5: Mild disc bulge and small central annular fissure. Moderate central spinal canal stenosis. Mild bilateral facet hypertrophy and mild bilateral neural foraminal narrowing.    L3-L4: Disc bulge and superimposed central and right central disc protrusion with moderate to severe central spinal canal stenosis, as well as mild right neural foraminal narrowing. No significant left neural foraminal narrowing. Mild bilateral facet hypertrophy with minimal left facet joint effusion.    L2-L3: No significant disc herniation or neural foraminal narrowing. Mild to moderate central spinal canal stenosis.    L1-L2: No significant disc herniation, central spinal canal stenosis, or neural foraminal narrowing.    The conus is normal in position and morphology at the T12-L1 level.    There is trace nonspecific free fluid in the pelvis, likely physiologic. There is no definite fluid collection or mass lesion within the visualized retroperitoneal or posterior paraspinal soft tissues.    IMPRESSION:    Large central through left subarticular zone disc extrusion at L5-S1 occupying nearly the entire cross-sectional area the spinal cord and severely effacing the thecal sac and compressing the cauda equina nerve roots.    Additional multilevel spondylosis superimposed on congenital lumbar spinal canal narrowing, as described, with disc protrusion at L3-L4 resulting in moderate to severe central spinal canal stenosis and disc bulge at L4-L5 resulting in moderate central spinal canal stenosis.    < end of copied text >    FINDINGS:    There is no evidence of acute fracture or dislocation. The joint spaces are maintained. The soft tissues are unremarkable.    IMPRESSION:    No evidence of acute fracture or dislocation.    Imaging Personally Reviewed:  [ ] YES  [ ] NO   Giovanni Tejada  Internal Medicine Acting Intern MS4  [phone]    INTERVAL HPI/OVERNIGHT EVENTS:  35F w/ class III obesity s/p gastric sleeve(2019; 100lb weight loss), chronic lower back pain with sciatica, HTN, chronic urinary incontinence presents with back pain and numbness over buttocks, groin, and left foot after fall.    Overnight: Pt says pain has improved with oxycodone currently 5/10 down from 12/10. Still has numbness and burning/tingling in the left hip, left foot and toes. Says left leg feels heavy and has weakness of left toes. Endorses chronic urinary incontinence which is unchanged. Denies bowel incontinence.       REVIEW OF SYSTEMS:  CONSTITUTIONAL: No fever, weight loss, or fatigue  EYES: No eye pain, visual disturbances, or discharge  ENMT:  No difficulty hearing, tinnitus, vertigo; No sinus or throat pain  NECK: No pain or stiffness  RESPIRATORY: No cough, wheezing, chills or hemoptysis; No shortness of breath  CARDIOVASCULAR: No chest pain, palpitations, dizziness, or leg swelling  GASTROINTESTINAL: No abdominal or epigastric pain. No nausea, vomiting, or hematemesis; No diarrhea. +constipation. No melena or hematochezia.  GENITOURINARY: No dysuria, frequency, hematuria, or incontinence  NEUROLOGICAL: No headaches, memory loss, loss of strength, numbness, or tremors  SKIN: No itching, burning, rashes, or lesions   MUSCULOSKELETAL: No joint pain or swelling; back pain improved. left hip, leg, foot numbness, tingling, burning.  PSYCHIATRIC: No depression, anxiety, mood swings, or difficulty sleeping  HEME/LYMPH: No easy bruising, or bleeding gums    T(C): 36.6 (01-26-21 @ 05:15), Max: 36.7 (01-25-21 @ 15:59)  HR: 78 (01-26-21 @ 05:15) (78 - 85)  BP: 141/90 (01-26-21 @ 05:15) (141/90 - 188/123)  RR: 20 (01-26-21 @ 05:15) (20 - 24)  SpO2: 97% (01-26-21 @ 05:15) (97% - 100%)  Wt(kg): --Vital Signs Last 24 Hrs  T(C): 36.6 (26 Jan 2021 05:15), Max: 36.7 (25 Jan 2021 15:59)  T(F): 97.8 (26 Jan 2021 05:15), Max: 98.1 (25 Jan 2021 15:59)  HR: 78 (26 Jan 2021 05:15) (78 - 85)  BP: 141/90 (26 Jan 2021 05:15) (141/90 - 188/123)  BP(mean): 124 (26 Jan 2021 00:13) (124 - 141)  RR: 20 (26 Jan 2021 05:15) (20 - 24)  SpO2: 97% (26 Jan 2021 05:15) (97% - 100%)    PHYSICAL EXAM:  GENERAL: NAD, well-groomed, well-developed  HEAD:  Atraumatic, Normocephalic  EYES: EOMI, PERRLA, conjunctiva and sclera clear  ENMT: No tonsillar erythema, exudates, or enlargement; Moist mucous membranes, Good dentition, No lesions  NECK: Supple, No JVD, Normal thyroid  CHEST/LUNG: Clear to percussion bilaterally; No rales, rhonchi, wheezing, or rubs  HEART: Regular rate and rhythm; No murmurs, rubs, or gallops  ABDOMEN: Soft, Nontender, Nondistended; Bowel sounds present  EXTREMITIES:  2+ Peripheral Pulses, No clubbing, cyanosis, or edema  LYMPH: No lymphadenopathy noted  SKIN: No rashes or lesions  NERVOUS SYSTEM:  Alert & Oriented X3, Good concentration; Motor Strength 5/5 B/L upper and 4/5 left lower extremity 5/5 right lower extremity; DTRs 2+ intact and symmetric. Achilles reflex 1+ bl. Reported numbness over palpation of left hip and left foot and toes. Decreased motor ability of left toes. Anal wink reflex intact. No spinal tenderness      LABS:                          13.0   9.50  )-----------( 214      ( 25 Jan 2021 18:26 )             37.7   01-25    141  |  104  |  17  ----------------------------<  86  4.2   |  25  |  0.60    Ca    9.1      25 Jan 2021 18:26    TPro  7.3  /  Alb  4.0  /  TBili  0.3  /  DBili  x   /  AST  12  /  ALT  10  /  AlkPhos  78  01-25        RADIOLOGY & ADDITIONAL TESTS:  < from: MR Lumbar Spine No Cont (01.25.21 @ 22:48) >  FINDINGS:  Lumbar alignment is maintained. Vertebral bodies are normal in height and signal without acute fracture or traumatic subluxation. There is mild grade 1 degenerative retrolisthesis of L5 on S1. There is disc desiccation at L3-L4 through L5-S1, as well as mild disc space narrowing at L5-S1.    There is congenital lumbar spinal canal stenosis.    Evaluation of individual levels demonstrates:    L5-S1: Large central, left central, and subarticular disc extrusion severely effacing the central spinal canal and compressing the cauda equina nerve roots. Moderate left and mild right neural foraminal narrowing. Mild right and minimal left facet joint effusions.    L4-L5: Mild disc bulge and small central annular fissure. Moderate central spinal canal stenosis. Mild bilateral facet hypertrophy and mild bilateral neural foraminal narrowing.    L3-L4: Disc bulge and superimposed central and right central disc protrusion with moderate to severe central spinal canal stenosis, as well as mild right neural foraminal narrowing. No significant left neural foraminal narrowing. Mild bilateral facet hypertrophy with minimal left facet joint effusion.    L2-L3: No significant disc herniation or neural foraminal narrowing. Mild to moderate central spinal canal stenosis.    L1-L2: No significant disc herniation, central spinal canal stenosis, or neural foraminal narrowing.    The conus is normal in position and morphology at the T12-L1 level.    There is trace nonspecific free fluid in the pelvis, likely physiologic. There is no definite fluid collection or mass lesion within the visualized retroperitoneal or posterior paraspinal soft tissues.    IMPRESSION:    Large central through left subarticular zone disc extrusion at L5-S1 occupying nearly the entire cross-sectional area the spinal cord and severely effacing the thecal sac and compressing the cauda equina nerve roots.    Additional multilevel spondylosis superimposed on congenital lumbar spinal canal narrowing, as described, with disc protrusion at L3-L4 resulting in moderate to severe central spinal canal stenosis and disc bulge at L4-L5 resulting in moderate central spinal canal stenosis.    < end of copied text >    FINDINGS:    There is no evidence of acute fracture or dislocation. The joint spaces are maintained. The soft tissues are unremarkable.    IMPRESSION:    No evidence of acute fracture or dislocation.    Imaging Personally Reviewed:  [ ] YES  [ ] NO

## 2021-01-26 NOTE — H&P ADULT - PROBLEM SELECTOR PROBLEM 5
Class 3 severe obesity with serious comorbidity and body mass index (BMI) of 50.0 to 59.9 in adult, unspecified obesity type

## 2021-01-27 DIAGNOSIS — K59.00 CONSTIPATION, UNSPECIFIED: ICD-10-CM

## 2021-01-27 LAB
A1C WITH ESTIMATED AVERAGE GLUCOSE RESULT: 5 % — SIGNIFICANT CHANGE UP (ref 4–5.6)
ANION GAP SERPL CALC-SCNC: 15 MMOL/L — SIGNIFICANT CHANGE UP (ref 5–17)
BASOPHILS # BLD AUTO: 0.02 K/UL — SIGNIFICANT CHANGE UP (ref 0–0.2)
BASOPHILS NFR BLD AUTO: 0.1 % — SIGNIFICANT CHANGE UP (ref 0–2)
BUN SERPL-MCNC: 15 MG/DL — SIGNIFICANT CHANGE UP (ref 7–23)
CALCIUM SERPL-MCNC: 9.4 MG/DL — SIGNIFICANT CHANGE UP (ref 8.4–10.5)
CHLORIDE SERPL-SCNC: 99 MMOL/L — SIGNIFICANT CHANGE UP (ref 96–108)
CO2 SERPL-SCNC: 23 MMOL/L — SIGNIFICANT CHANGE UP (ref 22–31)
CREAT SERPL-MCNC: 0.55 MG/DL — SIGNIFICANT CHANGE UP (ref 0.5–1.3)
EOSINOPHIL # BLD AUTO: 0.02 K/UL — SIGNIFICANT CHANGE UP (ref 0–0.5)
EOSINOPHIL NFR BLD AUTO: 0.1 % — SIGNIFICANT CHANGE UP (ref 0–6)
ESTIMATED AVERAGE GLUCOSE: 97 MG/DL — SIGNIFICANT CHANGE UP (ref 68–114)
GLUCOSE SERPL-MCNC: 109 MG/DL — HIGH (ref 70–99)
HCT VFR BLD CALC: 40.1 % — SIGNIFICANT CHANGE UP (ref 34.5–45)
HGB BLD-MCNC: 14.3 G/DL — SIGNIFICANT CHANGE UP (ref 11.5–15.5)
IMM GRANULOCYTES NFR BLD AUTO: 0.7 % — SIGNIFICANT CHANGE UP (ref 0–1.5)
LYMPHOCYTES # BLD AUTO: 2.8 K/UL — SIGNIFICANT CHANGE UP (ref 1–3.3)
LYMPHOCYTES # BLD AUTO: 20.8 % — SIGNIFICANT CHANGE UP (ref 13–44)
MAGNESIUM SERPL-MCNC: 2.2 MG/DL — SIGNIFICANT CHANGE UP (ref 1.6–2.6)
MCHC RBC-ENTMCNC: 29.5 PG — SIGNIFICANT CHANGE UP (ref 27–34)
MCHC RBC-ENTMCNC: 35.7 GM/DL — SIGNIFICANT CHANGE UP (ref 32–36)
MCV RBC AUTO: 82.9 FL — SIGNIFICANT CHANGE UP (ref 80–100)
MONOCYTES # BLD AUTO: 0.96 K/UL — HIGH (ref 0–0.9)
MONOCYTES NFR BLD AUTO: 7.1 % — SIGNIFICANT CHANGE UP (ref 2–14)
NEUTROPHILS # BLD AUTO: 9.6 K/UL — HIGH (ref 1.8–7.4)
NEUTROPHILS NFR BLD AUTO: 71.2 % — SIGNIFICANT CHANGE UP (ref 43–77)
NRBC # BLD: 0 /100 WBCS — SIGNIFICANT CHANGE UP (ref 0–0)
PLATELET # BLD AUTO: 196 K/UL — SIGNIFICANT CHANGE UP (ref 150–400)
POTASSIUM SERPL-MCNC: 3.9 MMOL/L — SIGNIFICANT CHANGE UP (ref 3.5–5.3)
POTASSIUM SERPL-SCNC: 3.9 MMOL/L — SIGNIFICANT CHANGE UP (ref 3.5–5.3)
RBC # BLD: 4.84 M/UL — SIGNIFICANT CHANGE UP (ref 3.8–5.2)
RBC # FLD: 13.9 % — SIGNIFICANT CHANGE UP (ref 10.3–14.5)
SODIUM SERPL-SCNC: 137 MMOL/L — SIGNIFICANT CHANGE UP (ref 135–145)
TSH SERPL-MCNC: 0.73 UIU/ML — SIGNIFICANT CHANGE UP (ref 0.27–4.2)
VIT B12 SERPL-MCNC: 581 PG/ML — SIGNIFICANT CHANGE UP (ref 232–1245)
WBC # BLD: 13.49 K/UL — HIGH (ref 3.8–10.5)
WBC # FLD AUTO: 13.49 K/UL — HIGH (ref 3.8–10.5)

## 2021-01-27 PROCEDURE — 99233 SBSQ HOSP IP/OBS HIGH 50: CPT | Mod: GC

## 2021-01-27 RX ORDER — OXYCODONE HYDROCHLORIDE 5 MG/1
10 TABLET ORAL EVERY 4 HOURS
Refills: 0 | Status: DISCONTINUED | OUTPATIENT
Start: 2021-01-27 | End: 2021-01-30

## 2021-01-27 RX ORDER — POLYETHYLENE GLYCOL 3350 17 G/17G
17 POWDER, FOR SOLUTION ORAL
Refills: 0 | Status: DISCONTINUED | OUTPATIENT
Start: 2021-01-27 | End: 2021-01-28

## 2021-01-27 RX ORDER — OXYCODONE HYDROCHLORIDE 5 MG/1
5 TABLET ORAL EVERY 4 HOURS
Refills: 0 | Status: DISCONTINUED | OUTPATIENT
Start: 2021-01-27 | End: 2021-01-30

## 2021-01-27 RX ORDER — ACETAMINOPHEN 500 MG
1000 TABLET ORAL ONCE
Refills: 0 | Status: COMPLETED | OUTPATIENT
Start: 2021-01-27 | End: 2021-01-27

## 2021-01-27 RX ORDER — CARVEDILOL PHOSPHATE 80 MG/1
6.25 CAPSULE, EXTENDED RELEASE ORAL EVERY 12 HOURS
Refills: 0 | Status: DISCONTINUED | OUTPATIENT
Start: 2021-01-27 | End: 2021-01-28

## 2021-01-27 RX ADMIN — CARVEDILOL PHOSPHATE 6.25 MILLIGRAM(S): 80 CAPSULE, EXTENDED RELEASE ORAL at 12:04

## 2021-01-27 RX ADMIN — Medication 400 MILLIGRAM(S): at 14:53

## 2021-01-27 RX ADMIN — SENNA PLUS 2 TABLET(S): 8.6 TABLET ORAL at 22:58

## 2021-01-27 RX ADMIN — Medication 650 MILLIGRAM(S): at 10:22

## 2021-01-27 RX ADMIN — CARVEDILOL PHOSPHATE 6.25 MILLIGRAM(S): 80 CAPSULE, EXTENDED RELEASE ORAL at 16:38

## 2021-01-27 RX ADMIN — Medication 50 MILLIGRAM(S): at 05:26

## 2021-01-27 RX ADMIN — Medication 650 MILLIGRAM(S): at 22:58

## 2021-01-27 RX ADMIN — Medication 650 MILLIGRAM(S): at 05:26

## 2021-01-27 RX ADMIN — Medication 102 MILLIGRAM(S): at 22:58

## 2021-01-27 RX ADMIN — DULOXETINE HYDROCHLORIDE 30 MILLIGRAM(S): 30 CAPSULE, DELAYED RELEASE ORAL at 11:04

## 2021-01-27 RX ADMIN — ENOXAPARIN SODIUM 40 MILLIGRAM(S): 100 INJECTION SUBCUTANEOUS at 11:04

## 2021-01-27 RX ADMIN — Medication 1 TABLET(S): at 11:03

## 2021-01-27 RX ADMIN — PREGABALIN 1000 MICROGRAM(S): 225 CAPSULE ORAL at 11:03

## 2021-01-27 RX ADMIN — Medication 102 MILLIGRAM(S): at 11:04

## 2021-01-27 RX ADMIN — Medication 650 MILLIGRAM(S): at 16:38

## 2021-01-27 RX ADMIN — OXYCODONE HYDROCHLORIDE 5 MILLIGRAM(S): 5 TABLET ORAL at 10:20

## 2021-01-27 RX ADMIN — Medication 650 MILLIGRAM(S): at 00:03

## 2021-01-27 RX ADMIN — Medication 102 MILLIGRAM(S): at 18:43

## 2021-01-27 RX ADMIN — PANTOPRAZOLE SODIUM 40 MILLIGRAM(S): 20 TABLET, DELAYED RELEASE ORAL at 05:16

## 2021-01-27 RX ADMIN — LOSARTAN POTASSIUM 100 MILLIGRAM(S): 100 TABLET, FILM COATED ORAL at 05:16

## 2021-01-27 RX ADMIN — Medication 1 MILLIGRAM(S): at 11:03

## 2021-01-27 NOTE — PROGRESS NOTE ADULT - SUBJECTIVE AND OBJECTIVE BOX
Giovanni Tejada  Internal Medicine Acting Intern MS4  [phone]    INTERVAL HPI/OVERNIGHT EVENTS:  35F w/ class III obesity s/p gastric sleeve(2019; 100lb weight loss), chronic lower back pain with sciatica, HTN, chronic urinary incontinence presents with back pain and numbness over buttocks, groin, and left foot after fall.    Overnight: HTN to 185/107 overnight. Given IV labetalol 5mg, BP improved to 160s/100s. Pain.....      REVIEW OF SYSTEMS:  CONSTITUTIONAL: No fever, weight loss, or fatigue  EYES: No eye pain, visual disturbances, or discharge  ENMT:  No difficulty hearing, tinnitus, vertigo; No sinus or throat pain  NECK: No pain or stiffness  RESPIRATORY: No cough, wheezing, chills or hemoptysis; No shortness of breath  CARDIOVASCULAR: No chest pain, palpitations, dizziness, or leg swelling  GASTROINTESTINAL: No abdominal or epigastric pain. No nausea, vomiting, or hematemesis; No diarrhea. +constipation. No melena or hematochezia.  GENITOURINARY: No dysuria, frequency, hematuria, or incontinence  NEUROLOGICAL: No headaches, memory loss, loss of strength, numbness, or tremors  SKIN: No itching, burning, rashes, or lesions   MUSCULOSKELETAL: No joint pain or swelling; back pain improved. left hip, leg, foot numbness, tingling, burning.  PSYCHIATRIC: No depression, anxiety, mood swings, or difficulty sleeping  HEME/LYMPH: No easy bruising, or bleeding gums    T(C): 36.6 (01-26-21 @ 05:15), Max: 36.7 (01-25-21 @ 15:59)  HR: 78 (01-26-21 @ 05:15) (78 - 85)  BP: 141/90 (01-26-21 @ 05:15) (141/90 - 188/123)  RR: 20 (01-26-21 @ 05:15) (20 - 24)  SpO2: 97% (01-26-21 @ 05:15) (97% - 100%)  Wt(kg): --Vital Signs Last 24 Hrs  T(C): 36.6 (26 Jan 2021 05:15), Max: 36.7 (25 Jan 2021 15:59)  T(F): 97.8 (26 Jan 2021 05:15), Max: 98.1 (25 Jan 2021 15:59)  HR: 78 (26 Jan 2021 05:15) (78 - 85)  BP: 141/90 (26 Jan 2021 05:15) (141/90 - 188/123)  BP(mean): 124 (26 Jan 2021 00:13) (124 - 141)  RR: 20 (26 Jan 2021 05:15) (20 - 24)  SpO2: 97% (26 Jan 2021 05:15) (97% - 100%)    PHYSICAL EXAM:  GENERAL: NAD, well-groomed, well-developed  HEAD:  Atraumatic, Normocephalic  EYES: EOMI, PERRLA, conjunctiva and sclera clear  ENMT: No tonsillar erythema, exudates, or enlargement; Moist mucous membranes, Good dentition, No lesions  NECK: Supple, No JVD, Normal thyroid  CHEST/LUNG: Clear to percussion bilaterally; No rales, rhonchi, wheezing, or rubs  HEART: Regular rate and rhythm; No murmurs, rubs, or gallops  ABDOMEN: Soft, Nontender, Nondistended; Bowel sounds present  EXTREMITIES:  2+ Peripheral Pulses, No clubbing, cyanosis, or edema  LYMPH: No lymphadenopathy noted  SKIN: No rashes or lesions  NERVOUS SYSTEM:  Alert & Oriented X3, Good concentration; Motor Strength 5/5 B/L upper and 4/5 left lower extremity 5/5 right lower extremity; DTRs 2+ intact and symmetric. Achilles reflex 1+ bl. Reported numbness over palpation of left hip and left foot and toes. Decreased motor ability of left toes. Anal wink reflex intact. No spinal tenderness      LABS:                          13.0   9.50  )-----------( 214      ( 25 Jan 2021 18:26 )             37.7   01-25    141  |  104  |  17  ----------------------------<  86  4.2   |  25  |  0.60    Ca    9.1      25 Jan 2021 18:26    TPro  7.3  /  Alb  4.0  /  TBili  0.3  /  DBili  x   /  AST  12  /  ALT  10  /  AlkPhos  78  01-25        RADIOLOGY & ADDITIONAL TESTS:  < from: MR Lumbar Spine No Cont (01.25.21 @ 22:48) >  IMPRESSION:    Large central through left subarticular zone disc extrusion at L5-S1 occupying nearly the entire cross-sectional area the spinal cord and severely effacing the thecal sac and compressing the cauda equina nerve roots.    Additional multilevel spondylosis superimposed on congenital lumbar spinal canal narrowing, as described, with disc protrusion at L3-L4 resulting in moderate to severe central spinal canal stenosis and disc bulge at L4-L5 resulting in moderate central spinal canal stenosis.    < end of copied text >      Imaging Personally Reviewed:  [ ] YES  [ ] NO   Giovanni Tejada  Internal Medicine Acting Intern MS4  [phone]    INTERVAL HPI/OVERNIGHT EVENTS:  35F w/ class III obesity s/p gastric sleeve(2019; 100lb weight loss), chronic lower back pain with sciatica, HTN, chronic urinary incontinence presents with back pain and numbness over buttocks, groin, and left foot after fall.    Overnight: HTN to 185/107 overnight. Given IV hydralazine. BP elevated again and she had a headache. Refused IV labetalol. Headache resolved with tylenol. Refused morning steroids because of fear of increasing BP. Pain is improved when lying on side. Pain worsened when sitting upright. Able to ambulate to bathroom. Asked PT to come back today instead of last night due to pain after sitting.    REVIEW OF SYSTEMS:  CONSTITUTIONAL: No fever, weight loss, or fatigue  EYES: No eye pain, visual disturbances, or discharge  ENMT:  No difficulty hearing, tinnitus, vertigo; No sinus or throat pain  NECK: No pain or stiffness  RESPIRATORY: No cough, wheezing, chills or hemoptysis; No shortness of breath  CARDIOVASCULAR: No chest pain, palpitations, dizziness, or leg swelling  GASTROINTESTINAL: No abdominal or epigastric pain. No nausea, vomiting, or hematemesis; No diarrhea. +constipation. No melena or hematochezia.  GENITOURINARY: No dysuria, frequency, hematuria, or incontinence  NEUROLOGICAL: No headaches, memory loss, loss of strength, numbness, or tremors  SKIN: No itching, burning, rashes, or lesions   MUSCULOSKELETAL: No joint pain or swelling; back pain improved. left hip, leg, foot numbness, tingling, burning.  PSYCHIATRIC: No depression, anxiety, mood swings, or difficulty sleeping  HEME/LYMPH: No easy bruising, or bleeding gums    T(C): 36.6 (01-26-21 @ 05:15), Max: 36.7 (01-25-21 @ 15:59)  HR: 78 (01-26-21 @ 05:15) (78 - 85)  BP: 141/90 (01-26-21 @ 05:15) (141/90 - 188/123)  RR: 20 (01-26-21 @ 05:15) (20 - 24)  SpO2: 97% (01-26-21 @ 05:15) (97% - 100%)  Wt(kg): --Vital Signs Last 24 Hrs  T(C): 36.6 (26 Jan 2021 05:15), Max: 36.7 (25 Jan 2021 15:59)  T(F): 97.8 (26 Jan 2021 05:15), Max: 98.1 (25 Jan 2021 15:59)  HR: 78 (26 Jan 2021 05:15) (78 - 85)  BP: 141/90 (26 Jan 2021 05:15) (141/90 - 188/123)  BP(mean): 124 (26 Jan 2021 00:13) (124 - 141)  RR: 20 (26 Jan 2021 05:15) (20 - 24)  SpO2: 97% (26 Jan 2021 05:15) (97% - 100%)    PHYSICAL EXAM:  GENERAL: NAD, well-groomed, well-developed  HEAD:  Atraumatic, Normocephalic  EYES: EOMI, PERRLA, conjunctiva and sclera clear  ENMT: No tonsillar erythema, exudates, or enlargement; Moist mucous membranes, Good dentition, No lesions  NECK: Supple, No JVD, Normal thyroid  CHEST/LUNG: Clear to percussion bilaterally; No rales, rhonchi, wheezing, or rubs  HEART: Regular rate and rhythm; No murmurs, rubs, or gallops  ABDOMEN: Soft, Nontender, Nondistended; Bowel sounds present  EXTREMITIES:  2+ Peripheral Pulses, No clubbing, cyanosis, or edema  LYMPH: No lymphadenopathy noted  SKIN: No rashes or lesions  NERVOUS SYSTEM:  Alert & Oriented X3, Good concentration; Motor Strength 5/5 B/L upper and 4/5 left lower extremity 5/5 right lower extremity; DTRs 2+ intact and symmetric. Achilles reflex 1+ bl. Reported numbness over palpation of left hip and left foot and toes. Decreased motor ability of left toes. Anal wink reflex intact. No spinal tenderness      LABS:                          13.0   9.50  )-----------( 214      ( 25 Jan 2021 18:26 )             37.7   01-25    141  |  104  |  17  ----------------------------<  86  4.2   |  25  |  0.60    Ca    9.1      25 Jan 2021 18:26    TPro  7.3  /  Alb  4.0  /  TBili  0.3  /  DBili  x   /  AST  12  /  ALT  10  /  AlkPhos  78  01-25        RADIOLOGY & ADDITIONAL TESTS:  < from: MR Lumbar Spine No Cont (01.25.21 @ 22:48) >  IMPRESSION:    Large central through left subarticular zone disc extrusion at L5-S1 occupying nearly the entire cross-sectional area the spinal cord and severely effacing the thecal sac and compressing the cauda equina nerve roots.    Additional multilevel spondylosis superimposed on congenital lumbar spinal canal narrowing, as described, with disc protrusion at L3-L4 resulting in moderate to severe central spinal canal stenosis and disc bulge at L4-L5 resulting in moderate central spinal canal stenosis.    < end of copied text >      Imaging Personally Reviewed:  [ ] YES  [ ] NO   Giovanni Tejada  Internal Medicine Acting Intern MS4  [phone]    INTERVAL HPI/OVERNIGHT EVENTS:  35F w/ class III obesity s/p gastric sleeve(2019; 100lb weight loss), chronic lower back pain with sciatica, HTN, chronic urinary incontinence presents with back pain and numbness over buttocks, groin, and left foot after fall.    Overnight: HTN to 185/107 overnight. Given IV hydralazine. BP elevated again and she had a headache. Refused IV labetalol. Headache resolved with tylenol. Refused morning steroids because of fear of increasing BP. Pain is improved when lying on side. Pain worsened when sitting upright. Able to ambulate to bathroom. Asked PT to come back today instead of last night due to pain after sitting.    REVIEW OF SYSTEMS:  CONSTITUTIONAL: No fever, weight loss, or fatigue  EYES: No eye pain, visual disturbances, or discharge  ENMT:  No difficulty hearing, tinnitus, vertigo; No sinus or throat pain  NECK: No pain or stiffness  RESPIRATORY: No cough, wheezing, chills or hemoptysis; No shortness of breath  CARDIOVASCULAR: No chest pain, palpitations, dizziness, or leg swelling  GASTROINTESTINAL: No abdominal or epigastric pain. No nausea, vomiting, or hematemesis; No diarrhea. +constipation. No melena or hematochezia.  GENITOURINARY: No dysuria, frequency, hematuria, or incontinence  NEUROLOGICAL: No headaches, memory loss, loss of strength, numbness, or tremors  SKIN: No itching, burning, rashes, or lesions   MUSCULOSKELETAL: No joint pain or swelling; back pain improved. left hip, leg, foot numbness, tingling, burning.  PSYCHIATRIC: No depression, anxiety, mood swings, or difficulty sleeping  HEME/LYMPH: No easy bruising, or bleeding gums    T(C): 36.6 (01-26-21 @ 05:15), Max: 36.7 (01-25-21 @ 15:59)  HR: 78 (01-26-21 @ 05:15) (78 - 85)  BP: 141/90 (01-26-21 @ 05:15) (141/90 - 188/123)  RR: 20 (01-26-21 @ 05:15) (20 - 24)  SpO2: 97% (01-26-21 @ 05:15) (97% - 100%)  Wt(kg): --Vital Signs Last 24 Hrs  T(C): 36.6 (26 Jan 2021 05:15), Max: 36.7 (25 Jan 2021 15:59)  T(F): 97.8 (26 Jan 2021 05:15), Max: 98.1 (25 Jan 2021 15:59)  HR: 78 (26 Jan 2021 05:15) (78 - 85)  BP: 141/90 (26 Jan 2021 05:15) (141/90 - 188/123)  BP(mean): 124 (26 Jan 2021 00:13) (124 - 141)  RR: 20 (26 Jan 2021 05:15) (20 - 24)  SpO2: 97% (26 Jan 2021 05:15) (97% - 100%)    PHYSICAL EXAM:  GENERAL: NAD, well-groomed, well-developed  HEAD:  Atraumatic, Normocephalic  EYES: EOMI, PERRLA, conjunctiva and sclera clear  ENMT: No tonsillar erythema, exudates, or enlargement; Moist mucous membranes, Good dentition, No lesions  NECK: Supple, No JVD, Normal thyroid  CHEST/LUNG: Clear to percussion bilaterally; No rales, rhonchi, wheezing, or rubs  HEART: Regular rate and rhythm; No murmurs, rubs, or gallops  ABDOMEN: Soft, Nontender, Nondistended; Bowel sounds present  EXTREMITIES:  2+ Peripheral Pulses, No clubbing, cyanosis, or edema  LYMPH: No lymphadenopathy noted  SKIN: No rashes or lesions  NERVOUS SYSTEM:  Alert & Oriented X3, Good concentration; Motor Strength 5/5 B/L upper and 4/5 left lower extremity 5/5 right lower extremity; DTRs 2+ intact and symmetric. Achilles reflex 1+ bl. Reported numbness over palpation of left hip and left foot and toes. Decreased motor ability of left toes. Anal wink reflex intact. No spinal tenderness      LABS:                          14.3   13.49 )-----------( x        ( 27 Jan 2021 07:10 )             40.1                           13.0   9.50  )-----------( 214      ( 25 Jan 2021 18:26 )             37.7   01-27    137  |  99  |  15  ----------------------------<  109<H>  3.9   |  23  |  0.55    Ca    9.4      27 Jan 2021 07:32  Mg     2.2     01-27    TPro  7.3  /  Alb  4.0  /  TBili  0.3  /  DBili  x   /  AST  12  /  ALT  10  /  AlkPhos  78  01-25 01-25    141  |  104  |  17  ----------------------------<  86  4.2   |  25  |  0.60    Ca    9.1      25 Jan 2021 18:26    TPro  7.3  /  Alb  4.0  /  TBili  0.3  /  DBili  x   /  AST  12  /  ALT  10  /  AlkPhos  78  01-25        RADIOLOGY & ADDITIONAL TESTS:  < from: MR Lumbar Spine No Cont (01.25.21 @ 22:48) >  IMPRESSION:    Large central through left subarticular zone disc extrusion at L5-S1 occupying nearly the entire cross-sectional area the spinal cord and severely effacing the thecal sac and compressing the cauda equina nerve roots.    Additional multilevel spondylosis superimposed on congenital lumbar spinal canal narrowing, as described, with disc protrusion at L3-L4 resulting in moderate to severe central spinal canal stenosis and disc bulge at L4-L5 resulting in moderate central spinal canal stenosis.    < end of copied text >      Imaging Personally Reviewed:  [ ] YES  [ ] NO   Giovanni Tejada  Internal Medicine Acting Intern MS4  [phone]    INTERVAL HPI/OVERNIGHT EVENTS:  35F w/ class III obesity s/p gastric sleeve(2019; 100lb weight loss), chronic lower back pain with sciatica, HTN, chronic urinary incontinence presents with back pain and numbness over buttocks, groin, and left foot after fall.    Overnight: HTN to 185/107 overnight. Given IV hydralazine. BP elevated again and she had a headache. Refused IV labetalol. Headache resolved with tylenol. Refused morning steroids because of fear of increasing BP. Pain is improved when lying on side. Pain worsened when sitting upright. Able to ambulate to bathroom on her own yesterday. Has not had a bowel movement since admission. Refuses bowel regimen. Asked PT to come back today instead of last night due to pain after sitting.    REVIEW OF SYSTEMS:  CONSTITUTIONAL: No fever, weight loss, or fatigue  EYES: No eye pain, visual disturbances, or discharge  ENMT:  No difficulty hearing, tinnitus, vertigo; No sinus or throat pain  NECK: No pain or stiffness  RESPIRATORY: No cough, wheezing, chills or hemoptysis; No shortness of breath  CARDIOVASCULAR: No chest pain, palpitations, dizziness, or leg swelling  GASTROINTESTINAL: No abdominal or epigastric pain. No nausea, vomiting, or hematemesis; No diarrhea. +constipation. No melena or hematochezia.  GENITOURINARY: No dysuria, frequency, hematuria, or incontinence  NEUROLOGICAL: No headaches, memory loss, loss of strength, numbness, or tremors  SKIN: No itching, burning, rashes, or lesions   MUSCULOSKELETAL: No joint pain or swelling; back pain improved. left hip, leg, foot numbness, tingling, burning.  PSYCHIATRIC: No depression, anxiety, mood swings, or difficulty sleeping  HEME/LYMPH: No easy bruising, or bleeding gums    T(C): 36.6 (01-26-21 @ 05:15), Max: 36.7 (01-25-21 @ 15:59)  HR: 78 (01-26-21 @ 05:15) (78 - 85)  BP: 141/90 (01-26-21 @ 05:15) (141/90 - 188/123)  RR: 20 (01-26-21 @ 05:15) (20 - 24)  SpO2: 97% (01-26-21 @ 05:15) (97% - 100%)  Wt(kg): --Vital Signs Last 24 Hrs  T(C): 36.6 (26 Jan 2021 05:15), Max: 36.7 (25 Jan 2021 15:59)  T(F): 97.8 (26 Jan 2021 05:15), Max: 98.1 (25 Jan 2021 15:59)  HR: 78 (26 Jan 2021 05:15) (78 - 85)  BP: 141/90 (26 Jan 2021 05:15) (141/90 - 188/123)  BP(mean): 124 (26 Jan 2021 00:13) (124 - 141)  RR: 20 (26 Jan 2021 05:15) (20 - 24)  SpO2: 97% (26 Jan 2021 05:15) (97% - 100%)    PHYSICAL EXAM:  GENERAL: NAD, well-groomed, well-developed  HEAD:  Atraumatic, Normocephalic  EYES: EOMI, PERRLA, conjunctiva and sclera clear  ENMT: No tonsillar erythema, exudates, or enlargement; Moist mucous membranes, Good dentition, No lesions  NECK: Supple, No JVD, Normal thyroid  CHEST/LUNG: Clear to percussion bilaterally; No rales, rhonchi, wheezing, or rubs  HEART: Regular rate and rhythm; No murmurs, rubs, or gallops  ABDOMEN: Soft, Nontender, Nondistended; Bowel sounds present  EXTREMITIES:  2+ Peripheral Pulses, No clubbing, cyanosis, or edema  LYMPH: No lymphadenopathy noted  SKIN: No rashes or lesions  NERVOUS SYSTEM:  Alert & Oriented X3, Good concentration; Motor Strength 5/5 B/L upper and 4/5 left lower extremity 5/5 right lower extremity; DTRs 2+ intact and symmetric. Achilles reflex 1+ bl. Reported numbness over palpation of left hip and left foot and toes. Decreased motor ability of left toes. Anal wink reflex intact. No spinal tenderness      LABS:                          14.3   13.49 )-----------( x        ( 27 Jan 2021 07:10 )             40.1                           13.0   9.50  )-----------( 214      ( 25 Jan 2021 18:26 )             37.7   01-27    137  |  99  |  15  ----------------------------<  109<H>  3.9   |  23  |  0.55    Ca    9.4      27 Jan 2021 07:32  Mg     2.2     01-27    TPro  7.3  /  Alb  4.0  /  TBili  0.3  /  DBili  x   /  AST  12  /  ALT  10  /  AlkPhos  78  01-25 01-25    141  |  104  |  17  ----------------------------<  86  4.2   |  25  |  0.60    Ca    9.1      25 Jan 2021 18:26    TPro  7.3  /  Alb  4.0  /  TBili  0.3  /  DBili  x   /  AST  12  /  ALT  10  /  AlkPhos  78  01-25        RADIOLOGY & ADDITIONAL TESTS:  < from: MR Lumbar Spine No Cont (01.25.21 @ 22:48) >  IMPRESSION:    Large central through left subarticular zone disc extrusion at L5-S1 occupying nearly the entire cross-sectional area the spinal cord and severely effacing the thecal sac and compressing the cauda equina nerve roots.    Additional multilevel spondylosis superimposed on congenital lumbar spinal canal narrowing, as described, with disc protrusion at L3-L4 resulting in moderate to severe central spinal canal stenosis and disc bulge at L4-L5 resulting in moderate central spinal canal stenosis.    < end of copied text >      Imaging Personally Reviewed:  [ ] YES  [ ] NO   Giovanni Tejada  Internal Medicine Acting Intern MS4  [phone]    INTERVAL HPI/OVERNIGHT EVENTS:  35F w/ class III obesity s/p gastric sleeve(2019; 100lb weight loss), chronic lower back pain with sciatica, HTN, chronic urinary incontinence presents with back pain and numbness over buttocks, groin, and left foot after fall.    Overnight: HTN to 185/107 overnight. Given IV hydralazine. BP elevated again and she had a headache. Refused IV labetalol. Headache resolved with tylenol. Refused morning steroids because of fear of increasing BP. Pain is improved when lying on side. Pain worsened when sitting upright. Able to ambulate to bathroom on her own yesterday. Has not had a bowel movement since admission. Refuses bowel regimen. Asked PT to come back today instead of last night due to pain after sitting.    REVIEW OF SYSTEMS:  CONSTITUTIONAL: No fever, weight loss, or fatigue  EYES: No eye pain, visual disturbances, or discharge  ENMT:  No difficulty hearing, tinnitus, vertigo; No sinus or throat pain  NECK: No pain or stiffness  RESPIRATORY: No cough, wheezing, chills or hemoptysis; No shortness of breath  CARDIOVASCULAR: No chest pain, palpitations, dizziness, or leg swelling  GASTROINTESTINAL: No abdominal or epigastric pain. No nausea, vomiting, or hematemesis; No diarrhea. +constipation. No melena or hematochezia.  GENITOURINARY: No dysuria, frequency, hematuria, or incontinence  NEUROLOGICAL: No headaches, memory loss, loss of strength, numbness, or tremors  SKIN: No itching, burning, rashes, or lesions   MUSCULOSKELETAL: No joint pain or swelling; back pain improved. left hip, leg, foot numbness, tingling, burning.  PSYCHIATRIC: No depression, anxiety, mood swings, or difficulty sleeping  HEME/LYMPH: No easy bruising, or bleeding gums    T(C): 36.6 (01-26-21 @ 05:15), Max: 36.7 (01-25-21 @ 15:59)  HR: 78 (01-26-21 @ 05:15) (78 - 85)  BP: 141/90 (01-26-21 @ 05:15) (141/90 - 188/123)  RR: 20 (01-26-21 @ 05:15) (20 - 24)  SpO2: 97% (01-26-21 @ 05:15) (97% - 100%)  Wt(kg): --Vital Signs Last 24 Hrs  T(C): 36.6 (26 Jan 2021 05:15), Max: 36.7 (25 Jan 2021 15:59)  T(F): 97.8 (26 Jan 2021 05:15), Max: 98.1 (25 Jan 2021 15:59)  HR: 78 (26 Jan 2021 05:15) (78 - 85)  BP: 141/90 (26 Jan 2021 05:15) (141/90 - 188/123)  BP(mean): 124 (26 Jan 2021 00:13) (124 - 141)  RR: 20 (26 Jan 2021 05:15) (20 - 24)  SpO2: 97% (26 Jan 2021 05:15) (97% - 100%)    PHYSICAL EXAM:  GENERAL: NAD, well-groomed, well-developed  HEAD:  Atraumatic, Normocephalic  EYES: EOMI, PERRLA, conjunctiva and sclera clear  ENMT: No tonsillar erythema, exudates, or enlargement; Moist mucous membranes, Good dentition, No lesions  NECK: Supple, No JVD, Normal thyroid  CHEST/LUNG: Clear to percussion bilaterally; No rales, rhonchi, wheezing, or rubs  HEART: Regular rate and rhythm; No murmurs, rubs, or gallops  ABDOMEN: Soft, Nontender, Nondistended; Bowel sounds present  EXTREMITIES:  2+ Peripheral Pulses, No clubbing, cyanosis, or edema  LYMPH: No lymphadenopathy noted  SKIN: No rashes or lesions  NERVOUS SYSTEM:  Alert & Oriented X3, Good concentration; Motor Strength 5/5 B/L upper and 5/5 left lower extremity 5/5 right lower extremity; DTRs 2+ intact and symmetric. Achilles reflex 1+ bl. Reported numbness over palpation of left hip and left foot and toes. Improved motor ability of left toes. Anal wink reflex intact. No spinal tenderness      LABS:                          14.3   13.49 )-----------( x        ( 27 Jan 2021 07:10 )             40.1                           13.0   9.50  )-----------( 214      ( 25 Jan 2021 18:26 )             37.7   01-27    137  |  99  |  15  ----------------------------<  109<H>  3.9   |  23  |  0.55    Ca    9.4      27 Jan 2021 07:32  Mg     2.2     01-27    TPro  7.3  /  Alb  4.0  /  TBili  0.3  /  DBili  x   /  AST  12  /  ALT  10  /  AlkPhos  78  01-25 01-25    141  |  104  |  17  ----------------------------<  86  4.2   |  25  |  0.60    Ca    9.1      25 Jan 2021 18:26    TPro  7.3  /  Alb  4.0  /  TBili  0.3  /  DBili  x   /  AST  12  /  ALT  10  /  AlkPhos  78  01-25        RADIOLOGY & ADDITIONAL TESTS:  < from: MR Lumbar Spine No Cont (01.25.21 @ 22:48) >  IMPRESSION:    Large central through left subarticular zone disc extrusion at L5-S1 occupying nearly the entire cross-sectional area the spinal cord and severely effacing the thecal sac and compressing the cauda equina nerve roots.    Additional multilevel spondylosis superimposed on congenital lumbar spinal canal narrowing, as described, with disc protrusion at L3-L4 resulting in moderate to severe central spinal canal stenosis and disc bulge at L4-L5 resulting in moderate central spinal canal stenosis.    < end of copied text >      Imaging Personally Reviewed:  [ ] YES  [ ] NO

## 2021-01-27 NOTE — PROGRESS NOTE ADULT - PROBLEM SELECTOR PLAN 8
DVT ppx: lovenox  istop#: [id number]   01/02/2021 01/07/2021 oxycodone hcl 5 mg tablet  6 2 [facility] #[id number]

## 2021-01-27 NOTE — PHYSICAL THERAPY INITIAL EVALUATION ADULT - PRECAUTIONS/LIMITATIONS, REHAB EVAL
CONT: MRI Lumbar spine 1/25: Large central through left subarticular zone disc extrusion at L5-S1 occupying nearly the entire cross-sectional area the spinal cord and severely effacing the thecal sac and compressing the cauda equina nerve roots. Additional multilevel spondylosis superimposed on congenital lumbar spinal canal narrowing, as described, with disc protrusion at L3-L4 resulting in moderate to severe central spinal canal stenosis and disc bulge at L4-L5 resulting in moderate central spinal canal stenosis. **Ortho rec conservative management at this time CONT: MRI Lumbar spine 1/25: Large central through left subarticular zone disc extrusion at L5-S1 occupying nearly the entire cross-sectional area the spinal cord and severely effacing the thecal sac and compressing the cauda equina nerve roots. Additional multilevel spondylosis superimposed on congenital lumbar spinal canal narrowing, as described, with disc protrusion at L3-L4 resulting in moderate to severe central spinal canal stenosis and disc bulge at L4-L5 resulting in moderate central spinal canal stenosis. **Ortho rec conservative management at this time/fall precautions

## 2021-01-27 NOTE — PROGRESS NOTE ADULT - ASSESSMENT
35y Female s/ large L5-S1 HNP  - Pain control  - FU labs  - WBAT  - PT/OT/OOB  - SCDs  - Decadron    - will discuss with Dr. Keane regarding plan and update if changes

## 2021-01-27 NOTE — PROGRESS NOTE ADULT - PROBLEM SELECTOR PLAN 1
Seen by Ortho Spine. CT lumbar spine documents significant stenosis. MR spine demonstrates compression of cauda equina nerve roots at L5-S1.   -Spine ortho team recs appreciated, patient preferring conservative mgt at this time. Amendable to surgery if not improving with medical therapy   -will continue dexamethasone 10mg IV every 6hrs  -standing tylenol 650mg for pain  -started on duloxetine 30mg for pain 1/26  -refused gabapentin, flexirol for pain  -PT/OT consulted Seen by Ortho Spine. CT lumbar spine documents significant stenosis. MR spine demonstrates compression of cauda equina nerve roots at L5-S1.   -Spine ortho team recs appreciated, patient preferring conservative mgt at this time. Amendable to surgery if not improving with medical therapy   -will continue dexamethasone 10mg IV every 6hrs. 1/26-1/27 Pt refused dexamethasone due to increased BP. Will adjust BP meds and pain control.  -standing tylenol 650mg for pain  -started on duloxetine 30mg for pain 1/26  -refused gabapentin, flexirol for pain  -PT/OT consulted Seen by Ortho Spine. CT lumbar spine documents significant stenosis. MR spine demonstrates compression of cauda equina nerve roots at L5-S1.   -Spine ortho team recs appreciated, patient preferring conservative mgt at this time. Amendable to surgery if not improving with medical therapy   -will continue dexamethasone 10mg IV every 6hrs. 1/26-1/27 Pt refused dexamethasone due to increased BP. Told her that steroids are necessary for her injury. Will adjust BP meds and pain control.  -IV tylenol for pain  - oxy 5mg for moderate, oxy 10mg for severe  -started on duloxetine 30mg for pain 1/26  -refused gabapentin, flexirol for pain  -PT/OT consulted

## 2021-01-27 NOTE — PROGRESS NOTE ADULT - PROBLEM SELECTOR PLAN 7
DVT ppx: lovenox  istop#: [id number]   01/02/2021 01/07/2021 oxycodone hcl 5 mg tablet  6 2 [id number] Med rec updated

## 2021-01-27 NOTE — PROGRESS NOTE ADULT - ASSESSMENT
35F w/ class III obesity s/p gastric sleeve(2019; 100lb weight loss), chronic lower back pain with sciatica, HTN, chronic urinary incontinence presents with back pain and numbness over buttocks, groin, and left foot after fall admitted to medicine for further management. Physical exam is significant for decreased left leg strength 4/5 and decreased sensation of left hip, left foot and toes. MRI demonstrates compression of the cauda equina nerve roots by disc at L5-S1.  Pain improving. Will continue with medical management and PT/OT. 35F w/ class III obesity s/p gastric sleeve(2019; 100lb weight loss), chronic lower back pain with sciatica, HTN, chronic urinary incontinence presents with back pain and numbness over buttocks, groin, and left foot after fall admitted to medicine for further management. Physical exam is significant for decreased left leg strength 4/5 and decreased sensation of left hip, left foot and toes. MRI demonstrates compression of the cauda equina nerve roots by disc at L5-S1.  Pain improving but worsens on movement. Will continue with medical management and PT/OT. Pt is considering spine surgery. 35F w/ class III obesity s/p gastric sleeve(2019; 100lb weight loss), chronic lower back pain with sciatica, HTN, chronic urinary incontinence presents with back pain and numbness over buttocks, groin, and left foot after fall admitted to medicine for further management. Physical exam is significant for decreased left leg strength 4/5 and decreased sensation of left hip, left foot and toes. MRI demonstrates compression of the cauda equina nerve roots by disc at L5-S1.  Pain worsens on movement. Will continue with medical management and PT/OT. Pt is considering spine surgery.

## 2021-01-27 NOTE — OCCUPATIONAL THERAPY INITIAL EVALUATION ADULT - PRECAUTIONS/LIMITATIONS, REHAB EVAL
She reports instantaneously developing severe 10/10, burning pain over lower back over tailbone, associated with numbness off her left buttock and groin, numbness of left 4th-6th toe, worsened when seated upright or when walking, improved when lying on the side, not improved with rest. She presented to the hospital because it did not improve over the day, the pain was in a new location and more severe than previous, and the sensation of numbness was a new symptom. She denies paralysis of the limbs. She does have chronic urinary incontinence but did not have bowel incontinence. She denies fever, chills, CP, palpitations, sob, cough, n/v/d, or painful urination. She reports instantaneously developing severe 10/10, burning pain over lower back over tailbone, associated with numbness off her left buttock and groin, numbness of left 4th-6th toe, worsened when seated upright or when walking, improved when lying on the side, not improved with rest. She presented to the hospital because it did not improve over the day, the pain was in a new location and more severe than previous, and the sensation of numbness was a new symptom. She denies paralysis of the limbs. She does have chronic urinary incontinence but did not have bowel incontinence. She denies fever, chills, CP, palpitations, sob, cough, n/v/d, or painful urination./fall precautions

## 2021-01-27 NOTE — PROGRESS NOTE ADULT - ATTENDING COMMENTS
-Patient seen/examined on 1/27/21. Case/plan discussed with the medical student and house staff as reviewed by me and in any comments below.  -Pain control. PT/OT.   -Patient overall feels a little better today.   -Patient refused several treatment options for elevated BP. But she was agreeable to carvedilol for now   -F/u ortho spine recs.

## 2021-01-27 NOTE — PROGRESS NOTE ADULT - PROBLEM SELECTOR PLAN 2
BP elevated on 1/26 and overnight to 180s/100s  - c/w home losartan  -increased HCTZ 1/26 to 50mg  -given IV labetalol 5mg overnight   -will consider adding amlodipine BP elevated on 1/26 and overnight to 180s/100s  - c/w home losartan  -increased HCTZ 1/26 to 50mg  -given IV hydralazine for elevated BP last night  -refused labetalol  -will consider adding amlodipine and better pain control to reduce BP BP elevated on 1/26 and overnight to 180s/100s. Discussed about increased BP and that although it may be from pain and steroids we need  to control it. She refused amlodipine, metoprolol, labetalol. Amendable to carvedilol.  - c/w home losartan  -increased HCTZ 1/26 to 50mg  -given IV hydralazine for elevated BP last night  -refused labetalol  -better pain control necessary  -will start carvedilol 6.25 BP elevated on 1/26 and overnight to 180s/100s. Discussed about increased BP and that although it may be from pain and steroids we need  to control it. She refused amlodipine, metoprolol, labetalol because she says they don't work for her. Amendable to carvedilol.  - c/w home losartan  -increased HCTZ 1/26 to 50mg  -given IV hydralazine for elevated BP last night  -refused labetalol overnight  -better pain control to lower BP  -will start carvedilol 6.25

## 2021-01-27 NOTE — PROGRESS NOTE ADULT - SUBJECTIVE AND OBJECTIVE BOX
Orthopaedic Surgery Progress Note    Subjective:   Patient seen and examined today. No acute events overnight. States pain slightly improved. Denies f/c.    Objective:  T(C): 36.7 (01-27-21 @ 05:08), Max: 36.8 (01-26-21 @ 20:47)  HR: 90 (01-27-21 @ 05:08) (73 - 98)  BP: 151/96 (01-27-21 @ 05:08) (151/96 - 185/109)  RR: 18 (01-27-21 @ 05:08) (18 - 20)  SpO2: 97% (01-27-21 @ 05:08) (97% - 98%)  Wt(kg): --    01-26 @ 07:01  -  01-27 @ 06:23  --------------------------------------------------------  IN: 380 mL / OUT: 1200 mL / NET: -820 mL        PE    General; Awake and alert, Oriented x 3, following commands  Spine: No TTP over spinous processes or paraspinal muscles at C/T/L spine. No palpable step off.   Able to actively SLR BL. No pain to passive SLR    +numbness about the left buttock    Motor exam:        C5       C6       C7       C8       T1   R  5/5      5/5     5/5     5/5      5/5  L   5/5      5/5     5/5     5/5      5/5         L2        L3       L4       L5      S1  R  5/5      5/5     5/5     5/5    5/5  L   5/5     5/5      5/5     5/5    5/5    Sensory:  (0=absent, 1=impaired, 2=normal, NT=not testable)      C5    C6   C7   C8   T1         R   2     2      2     2      2  L    2     2      2     2      2        L2    L3   L4   L5   S1   R   2     2     2     2     2  L    2     2     2     0     2    BL Upper extremity:   Negative Madsen  +Rad Pulse  Compartments soft and compressible                            13.0   9.50  )-----------( 214      ( 25 Jan 2021 18:26 )             37.7     01-25    141  |  104  |  17  ----------------------------<  86  4.2   |  25  |  0.60    Ca    9.1      25 Jan 2021 18:26    TPro  7.3  /  Alb  4.0  /  TBili  0.3  /  DBili  x   /  AST  12  /  ALT  10  /  AlkPhos  78  01-25

## 2021-01-27 NOTE — PROGRESS NOTE ADULT - PROBLEM SELECTOR PLAN 5
- smoking cessation  counselling provided Has not had bowel movement since admission. Refused bowel regimen. Will re-discuss with patient.

## 2021-01-28 ENCOUNTER — TRANSCRIPTION ENCOUNTER (OUTPATIENT)
Age: 36
End: 2021-01-28

## 2021-01-28 DIAGNOSIS — Z02.9 ENCOUNTER FOR ADMINISTRATIVE EXAMINATIONS, UNSPECIFIED: ICD-10-CM

## 2021-01-28 LAB
ANION GAP SERPL CALC-SCNC: 12 MMOL/L — SIGNIFICANT CHANGE UP (ref 5–17)
BUN SERPL-MCNC: 19 MG/DL — SIGNIFICANT CHANGE UP (ref 7–23)
CALCIUM SERPL-MCNC: 9.3 MG/DL — SIGNIFICANT CHANGE UP (ref 8.4–10.5)
CHLORIDE SERPL-SCNC: 97 MMOL/L — SIGNIFICANT CHANGE UP (ref 96–108)
CO2 SERPL-SCNC: 24 MMOL/L — SIGNIFICANT CHANGE UP (ref 22–31)
CREAT SERPL-MCNC: 0.5 MG/DL — SIGNIFICANT CHANGE UP (ref 0.5–1.3)
GLUCOSE SERPL-MCNC: 158 MG/DL — HIGH (ref 70–99)
PHOSPHATE SERPL-MCNC: 3.7 MG/DL — SIGNIFICANT CHANGE UP (ref 2.5–4.5)
POTASSIUM SERPL-MCNC: 4.7 MMOL/L — SIGNIFICANT CHANGE UP (ref 3.5–5.3)
POTASSIUM SERPL-SCNC: 4.7 MMOL/L — SIGNIFICANT CHANGE UP (ref 3.5–5.3)
SODIUM SERPL-SCNC: 133 MMOL/L — LOW (ref 135–145)

## 2021-01-28 PROCEDURE — 99233 SBSQ HOSP IP/OBS HIGH 50: CPT | Mod: GC

## 2021-01-28 PROCEDURE — 99231 SBSQ HOSP IP/OBS SF/LOW 25: CPT

## 2021-01-28 RX ORDER — CARVEDILOL PHOSPHATE 80 MG/1
12.5 CAPSULE, EXTENDED RELEASE ORAL EVERY 12 HOURS
Refills: 0 | Status: DISCONTINUED | OUTPATIENT
Start: 2021-01-28 | End: 2021-02-03

## 2021-01-28 RX ORDER — CARVEDILOL PHOSPHATE 80 MG/1
6.25 CAPSULE, EXTENDED RELEASE ORAL ONCE
Refills: 0 | Status: COMPLETED | OUTPATIENT
Start: 2021-01-28 | End: 2021-01-28

## 2021-01-28 RX ADMIN — Medication 1 TABLET(S): at 12:47

## 2021-01-28 RX ADMIN — Medication 1 TABLET(S): at 12:46

## 2021-01-28 RX ADMIN — PANTOPRAZOLE SODIUM 40 MILLIGRAM(S): 20 TABLET, DELAYED RELEASE ORAL at 05:07

## 2021-01-28 RX ADMIN — Medication 650 MILLIGRAM(S): at 12:47

## 2021-01-28 RX ADMIN — Medication 1 MILLIGRAM(S): at 12:47

## 2021-01-28 RX ADMIN — OXYCODONE HYDROCHLORIDE 5 MILLIGRAM(S): 5 TABLET ORAL at 09:21

## 2021-01-28 RX ADMIN — Medication 50 MILLIGRAM(S): at 05:07

## 2021-01-28 RX ADMIN — PREGABALIN 1000 MICROGRAM(S): 225 CAPSULE ORAL at 12:47

## 2021-01-28 RX ADMIN — Medication 24 MILLIGRAM(S): at 05:26

## 2021-01-28 RX ADMIN — CARVEDILOL PHOSPHATE 6.25 MILLIGRAM(S): 80 CAPSULE, EXTENDED RELEASE ORAL at 05:07

## 2021-01-28 RX ADMIN — Medication 650 MILLIGRAM(S): at 18:33

## 2021-01-28 RX ADMIN — Medication 650 MILLIGRAM(S): at 05:07

## 2021-01-28 RX ADMIN — LOSARTAN POTASSIUM 100 MILLIGRAM(S): 100 TABLET, FILM COATED ORAL at 08:37

## 2021-01-28 RX ADMIN — CARVEDILOL PHOSPHATE 6.25 MILLIGRAM(S): 80 CAPSULE, EXTENDED RELEASE ORAL at 08:36

## 2021-01-28 RX ADMIN — ENOXAPARIN SODIUM 40 MILLIGRAM(S): 100 INJECTION SUBCUTANEOUS at 12:47

## 2021-01-28 RX ADMIN — CARVEDILOL PHOSPHATE 12.5 MILLIGRAM(S): 80 CAPSULE, EXTENDED RELEASE ORAL at 18:32

## 2021-01-28 RX ADMIN — DULOXETINE HYDROCHLORIDE 30 MILLIGRAM(S): 30 CAPSULE, DELAYED RELEASE ORAL at 12:46

## 2021-01-28 NOTE — PROGRESS NOTE ADULT - PROBLEM SELECTOR PLAN 5
Has not had bowel movement since admission. Refused bowel regimen. Will re-discuss with patient. Has not had bowel movement since admission. Refused bowel regimen. Will re-discuss with patient.  - currently on bowel regimen. senna and PEG. Has not had bowel movement since admission. Refused bowel regimen. Will re-discuss with patient.  -had incontinence of stool this AM  -hold senna and miralax

## 2021-01-28 NOTE — PROVIDER CONTACT NOTE (OTHER) - RECOMMENDATIONS
Does it need to be re-drawn today?
Continue to monitor
Labetalol IV to be ordered
give hydrochlorothiazide po

## 2021-01-28 NOTE — DISCHARGE NOTE PROVIDER - HOSPITAL COURSE
35F w/ class III obesity s/p gastric sleeve(2019; 100lb weight loss), chronic lower back pain with sciatica, HTN, chronic urinary incontinence presents with back pain and numbness over buttocks, groin, and left foot after fall admitted to medicine for further management. Physical exam is significant for decreased left leg strength 4/5 and decreased sensation of left hip, left foot and toes. MRI demonstrates compression of the cauda equina nerve roots by disc at L5-S1.  Pt was started on dexamethasone IV 10mg q6hrs and oxycodone 5mg for moderate and 10mg for severe for pain control. Pt was seen by ortho spine team and evaluated. They recommended medical managmenet while in the hospital and to followup outpatient to discuss surgical options if conservative management is not sufficient. Pt will be discharged on medrol dose pack and pain medication for 1 week and will follow up with ortho spine in clinic in 1-2 weeks. 35F w/ class III obesity s/p gastric sleeve(2019; 100lb weight loss), chronic lower back pain with sciatica, HTN, chronic urinary incontinence presents with back pain and numbness over buttocks, groin, and left foot after fall admitted to medicine for further management. Physical exam is significant for decreased left leg strength 4/5 and decreased sensation of left hip, left foot and toes. MRI demonstrates compression of the cauda equina nerve roots by disc at L5-S1.  Pt was started on dexamethasone IV 10mg q6hrs and oxycodone 5mg for moderate and 10mg for severe for pain control. Pt was seen by ortho spine team and evaluated. They recommended medical management while in the hospital and to followup outpatient to discuss surgical options if conservative management is not sufficient. Pt had fecal incontinence after stool softeners due to saddle anaesthesia and decreased rectal tone. Discontinued bowel regimen and ortho said to follow up outpatient. Pt will be discharged on medrol dose pack and pain medication for 1 week and will follow up with ortho spine in clinic in 1-2 weeks. Pt was given prescription for new blood pressure medications. Increased HCTZ to 50mg and started carvedilol 12.5mg. 35F w/ class III obesity s/p gastric sleeve(2019; 100lb weight loss), chronic lower back pain with sciatica, HTN, chronic urinary incontinence presents with back pain and numbness over buttocks, groin, and left foot after fall admitted to medicine for further management. Physical exam is significant for decreased left leg strength 4/5 and decreased sensation of left hip, left foot and toes. MRI demonstrates compression of the cauda equina nerve roots by disc at L5-S1.  Pt was started on dexamethasone IV 10mg q6hrs and oxycodone 5mg for moderate and 10mg for severe for pain control. Pt was seen by ortho spine team and evaluated. They recommended medical management while in the hospital and to followup outpatient to discuss surgical options if conservative management is not sufficient. Pt had fecal incontinence after stool softeners due to saddle anaesthesia and decreased rectal tone. Discontinued bowel regimen and ortho said to follow up outpatient. Pt will be discharged on medrol dose pack and pain medication for 1 week and will follow up with ortho spine in clinic in 1-2 weeks. Pt was given prescription for new blood pressure medications. Increased HCTZ to 50mg and started carvedilol 12.5mg. Physical therapy recommended rolling walker and OT said no requirements. 35F w/ class III obesity s/p gastric sleeve(2019; 100lb weight loss), chronic lower back pain with sciatica, HTN, chronic urinary incontinence presents with back pain and numbness over buttocks, groin, and left foot after fall admitted to medicine for further management. Physical exam is significant for decreased left leg strength 4/5 and decreased sensation of left hip, left foot and toes. MRI demonstrates compression of the cauda equina nerve roots by disc at L5-S1.  Pt was started on dexamethasone IV 10mg q6hrs and oxycodone 5mg for moderate and 10mg for severe for pain control. Pt was hypertensive during admission. Increased HCTZ to 50mg and started carvedilol 12.5mg. They recommended medical management while in the hospital and to followup outpatient to discuss surgical options if conservative management is not sufficient. Pt developed fecal incontinence after stool softeners due to saddle anaesthesia and decreased rectal tone on day 3 of admission. Discontinued bowel regimen and ortho was reconsulted. Ortho reevaluated and decided the patient required surgery. PT/OT were consulted. OT said no requirements. PT said... 35F w/ class III obesity s/p gastric sleeve(2019; 100lb weight loss), chronic lower back pain with sciatica, HTN, chronic urinary incontinence presents with back pain and numbness over buttocks, groin, and left foot after fall admitted to medicine for further management. Physical exam is significant for decreased left leg strength 4/5 and decreased sensation of left hip, left foot and toes. MRI demonstrates compression of the cauda equina nerve roots by disc at L5-S1.  Pt was started on dexamethasone IV 10mg q6hrs and oxycodone 5mg for moderate and 10mg for severe for pain control. Pt was hypertensive during admission. Increased HCTZ to 50mg and started carvedilol 12.5mg. They recommended medical management while in the hospital and to followup outpatient to discuss surgical options if conservative management is not sufficient. Pt developed fecal incontinence after stool softeners due to saddle anaesthesia and decreased rectal tone on day 3 of admission. Discontinued bowel regimen and ortho was reconsulted. Ortho reevaluated and decided the patient required surgery. PT/OT were consulted. OT said no requirements. PT said Home PT. Seen by [facility]. Patient strongly prefers to go home with home therapies- explained she would benefit from acute rehab and discussed concern about possible fall.

## 2021-01-28 NOTE — PROGRESS NOTE ADULT - SUBJECTIVE AND OBJECTIVE BOX
Giovanni Tejada  Internal Medicine Acting Intern MS4  [phone]    INTERVAL HPI/OVERNIGHT EVENTS:  35F w/ class III obesity s/p gastric sleeve(2019; 100lb weight loss), chronic lower back pain with sciatica, HTN, chronic urinary incontinence presents with back pain and numbness over buttocks, groin, and left foot after fall.    Overnight: BP was better overnight with carvedilol. Pt denies return of headache. BM... Urine.... Pain....    REVIEW OF SYSTEMS:  CONSTITUTIONAL: No fever, weight loss, or fatigue  EYES: No eye pain, visual disturbances, or discharge  ENMT:  No difficulty hearing, tinnitus, vertigo; No sinus or throat pain  NECK: No pain or stiffness  RESPIRATORY: No cough, wheezing, chills or hemoptysis; No shortness of breath  CARDIOVASCULAR: No chest pain, palpitations, dizziness, or leg swelling  GASTROINTESTINAL: No abdominal or epigastric pain. No nausea, vomiting, or hematemesis; No diarrhea. +constipation. No melena or hematochezia.  GENITOURINARY: No dysuria, frequency, hematuria, or incontinence  NEUROLOGICAL: No headaches, memory loss, loss of strength, numbness, or tremors  SKIN: No itching, burning, rashes, or lesions   MUSCULOSKELETAL: No joint pain or swelling; back pain improved. left hip, leg, foot numbness, tingling, burning.  PSYCHIATRIC: No depression, anxiety, mood swings, or difficulty sleeping  HEME/LYMPH: No easy bruising, or bleeding gums    T(C): 36.6 (01-26-21 @ 05:15), Max: 36.7 (01-25-21 @ 15:59)  HR: 78 (01-26-21 @ 05:15) (78 - 85)  BP: 141/90 (01-26-21 @ 05:15) (141/90 - 188/123)  RR: 20 (01-26-21 @ 05:15) (20 - 24)  SpO2: 97% (01-26-21 @ 05:15) (97% - 100%)  Wt(kg): --Vital Signs Last 24 Hrs  T(C): 36.6 (26 Jan 2021 05:15), Max: 36.7 (25 Jan 2021 15:59)  T(F): 97.8 (26 Jan 2021 05:15), Max: 98.1 (25 Jan 2021 15:59)  HR: 78 (26 Jan 2021 05:15) (78 - 85)  BP: 141/90 (26 Jan 2021 05:15) (141/90 - 188/123)  BP(mean): 124 (26 Jan 2021 00:13) (124 - 141)  RR: 20 (26 Jan 2021 05:15) (20 - 24)  SpO2: 97% (26 Jan 2021 05:15) (97% - 100%)    PHYSICAL EXAM:  GENERAL: NAD, well-groomed, well-developed  HEAD:  Atraumatic, Normocephalic  EYES: EOMI, PERRLA, conjunctiva and sclera clear  ENMT: No tonsillar erythema, exudates, or enlargement; Moist mucous membranes, Good dentition, No lesions  NECK: Supple, No JVD, Normal thyroid  CHEST/LUNG: Clear to percussion bilaterally; No rales, rhonchi, wheezing, or rubs  HEART: Regular rate and rhythm; No murmurs, rubs, or gallops  ABDOMEN: Soft, Nontender, Nondistended; Bowel sounds present  EXTREMITIES:  2+ Peripheral Pulses, No clubbing, cyanosis, or edema  LYMPH: No lymphadenopathy noted  SKIN: No rashes or lesions  NERVOUS SYSTEM:  Alert & Oriented X3, Good concentration; Motor Strength 5/5 B/L upper and 5/5 left lower extremity 5/5 right lower extremity; DTRs 2+ intact and symmetric. Achilles reflex 1+ bl. Reported numbness over palpation of left hip and left foot and toes. Improved motor ability of left toes. Anal wink reflex intact. No spinal tenderness      LABS:                          14.3   13.49 )-----------( x        ( 27 Jan 2021 07:10 )             40.1                           13.0   9.50  )-----------( 214      ( 25 Jan 2021 18:26 )             37.7   01-27    137  |  99  |  15  ----------------------------<  109<H>  3.9   |  23  |  0.55    Ca    9.4      27 Jan 2021 07:32  Mg     2.2     01-27    TPro  7.3  /  Alb  4.0  /  TBili  0.3  /  DBili  x   /  AST  12  /  ALT  10  /  AlkPhos  78  01-25 01-25    141  |  104  |  17  ----------------------------<  86  4.2   |  25  |  0.60    Ca    9.1      25 Jan 2021 18:26    TPro  7.3  /  Alb  4.0  /  TBili  0.3  /  DBili  x   /  AST  12  /  ALT  10  /  AlkPhos  78  01-25        RADIOLOGY & ADDITIONAL TESTS:  < from: MR Lumbar Spine No Cont (01.25.21 @ 22:48) >  IMPRESSION:    Large central through left subarticular zone disc extrusion at L5-S1 occupying nearly the entire cross-sectional area the spinal cord and severely effacing the thecal sac and compressing the cauda equina nerve roots.    Additional multilevel spondylosis superimposed on congenital lumbar spinal canal narrowing, as described, with disc protrusion at L3-L4 resulting in moderate to severe central spinal canal stenosis and disc bulge at L4-L5 resulting in moderate central spinal canal stenosis.    < end of copied text >      Imaging Personally Reviewed:  [ ] YES  [ ] NO   Giovanni Tejada  Internal Medicine Acting Intern MS4  [phone]    INTERVAL HPI/OVERNIGHT EVENTS:  35F w/ class III obesity s/p gastric sleeve(2019; 100lb weight loss), chronic lower back pain with sciatica, HTN, chronic urinary incontinence presents with back pain and numbness over buttocks, groin, and left foot after fall.    Overnight: BP was better overnight with carvedilol. Pt denies return of headache. Pain is improving, now a 3/10. Pt feels she is able to go home. Urine incontinence is still present. Pt still has not made a bowel movement. Able to ambulate and sit better today.    REVIEW OF SYSTEMS:  CONSTITUTIONAL: No fever, weight loss, or fatigue  EYES: No eye pain, visual disturbances, or discharge  ENMT:  No difficulty hearing, tinnitus, vertigo; No sinus or throat pain  NECK: No pain or stiffness  RESPIRATORY: No cough, wheezing, chills or hemoptysis; No shortness of breath  CARDIOVASCULAR: No chest pain, palpitations, dizziness, or leg swelling  GASTROINTESTINAL: No abdominal or epigastric pain. No nausea, vomiting, or hematemesis; No diarrhea. +constipation. No melena or hematochezia.  GENITOURINARY: No dysuria, frequency, hematuria, or incontinence  NEUROLOGICAL: No headaches, memory loss, loss of strength, numbness, or tremors  SKIN: No itching, burning, rashes, or lesions   MUSCULOSKELETAL: No joint pain or swelling; back pain improved. left hip, leg, foot numbness, tingling, burning.  PSYCHIATRIC: No depression, anxiety, mood swings, or difficulty sleeping  HEME/LYMPH: No easy bruising, or bleeding gums    T(C): 36.6 (01-26-21 @ 05:15), Max: 36.7 (01-25-21 @ 15:59)  HR: 78 (01-26-21 @ 05:15) (78 - 85)  BP: 141/90 (01-26-21 @ 05:15) (141/90 - 188/123)  RR: 20 (01-26-21 @ 05:15) (20 - 24)  SpO2: 97% (01-26-21 @ 05:15) (97% - 100%)  Wt(kg): --Vital Signs Last 24 Hrs  T(C): 36.6 (26 Jan 2021 05:15), Max: 36.7 (25 Jan 2021 15:59)  T(F): 97.8 (26 Jan 2021 05:15), Max: 98.1 (25 Jan 2021 15:59)  HR: 78 (26 Jan 2021 05:15) (78 - 85)  BP: 141/90 (26 Jan 2021 05:15) (141/90 - 188/123)  BP(mean): 124 (26 Jan 2021 00:13) (124 - 141)  RR: 20 (26 Jan 2021 05:15) (20 - 24)  SpO2: 97% (26 Jan 2021 05:15) (97% - 100%)    PHYSICAL EXAM:  GENERAL: NAD, well-groomed, well-developed  HEAD:  Atraumatic, Normocephalic  EYES: EOMI, PERRLA, conjunctiva and sclera clear  ENMT: No tonsillar erythema, exudates, or enlargement; Moist mucous membranes, Good dentition, No lesions  NECK: Supple, No JVD, Normal thyroid  CHEST/LUNG: Clear to percussion bilaterally; No rales, rhonchi, wheezing, or rubs  HEART: Regular rate and rhythm; No murmurs, rubs, or gallops  ABDOMEN: Soft, Nontender, Nondistended; Bowel sounds present  EXTREMITIES:  2+ Peripheral Pulses, No clubbing, cyanosis, or edema  LYMPH: No lymphadenopathy noted  SKIN: No rashes or lesions  NERVOUS SYSTEM:  Alert & Oriented X3, Good concentration; Motor Strength 5/5 B/L upper and 5/5 left lower extremity 5/5 right lower extremity; DTRs 2+ intact and symmetric. Achilles reflex 1+ bl. Reported numbness over palpation of left hip and left foot and toes. Improved motor ability of left toes. Anal wink reflex intact. No spinal tenderness      LABS:                          14.3   13.49 )-----------( x        ( 27 Jan 2021 07:10 )             40.1                           13.0   9.50  )-----------( 214      ( 25 Jan 2021 18:26 )             37.7   01-27    137  |  99  |  15  ----------------------------<  109<H>  3.9   |  23  |  0.55    Ca    9.4      27 Jan 2021 07:32  Mg     2.2     01-27    TPro  7.3  /  Alb  4.0  /  TBili  0.3  /  DBili  x   /  AST  12  /  ALT  10  /  AlkPhos  78  01-25 01-25    141  |  104  |  17  ----------------------------<  86  4.2   |  25  |  0.60    Ca    9.1      25 Jan 2021 18:26    TPro  7.3  /  Alb  4.0  /  TBili  0.3  /  DBili  x   /  AST  12  /  ALT  10  /  AlkPhos  78  01-25        RADIOLOGY & ADDITIONAL TESTS:  < from: MR Lumbar Spine No Cont (01.25.21 @ 22:48) >  IMPRESSION:    Large central through left subarticular zone disc extrusion at L5-S1 occupying nearly the entire cross-sectional area the spinal cord and severely effacing the thecal sac and compressing the cauda equina nerve roots.    Additional multilevel spondylosis superimposed on congenital lumbar spinal canal narrowing, as described, with disc protrusion at L3-L4 resulting in moderate to severe central spinal canal stenosis and disc bulge at L4-L5 resulting in moderate central spinal canal stenosis.    < end of copied text >      Imaging Personally Reviewed:  [ ] YES  [ ] NO   Giovanni Tejada  Internal Medicine Acting Intern MS4  [phone]    INTERVAL HPI/OVERNIGHT EVENTS:  35F w/ class III obesity s/p gastric sleeve(2019; 100lb weight loss), chronic lower back pain with sciatica, HTN, chronic urinary incontinence presents with back pain and numbness over buttocks, groin, and left foot after fall.    Overnight: BP was better overnight with carvedilol. Pt denies return of headache. Pain is improving, now a 3/10. Had incontinence of stool this AM and has new loss of sensation in smita-rectal area (new from admit, previously able to feel when she was having BM). Also noted to have diminished rectal tone on exam today.     REVIEW OF SYSTEMS:  CONSTITUTIONAL: No fever, weight loss, or fatigue  EYES: No eye pain, visual disturbances, or discharge  ENMT:  No difficulty hearing, tinnitus, vertigo; No sinus or throat pain  NECK: No pain or stiffness  RESPIRATORY: No cough, wheezing, chills or hemoptysis; No shortness of breath  CARDIOVASCULAR: No chest pain, palpitations, dizziness, or leg swelling  GASTROINTESTINAL: No abdominal or epigastric pain. No nausea, vomiting, or hematemesis; No diarrhea. +constipation. No melena or hematochezia.  GENITOURINARY: No dysuria, frequency, hematuria, or incontinence  NEUROLOGICAL: No headaches, memory loss, loss of strength, numbness, or tremors  SKIN: No itching, burning, rashes, or lesions   MUSCULOSKELETAL: No joint pain or swelling; back pain improved. left hip, leg, foot numbness, tingling, burning.  PSYCHIATRIC: No depression, anxiety, mood swings, or difficulty sleeping  HEME/LYMPH: No easy bruising, or bleeding gums    T(C): 36.6 (01-26-21 @ 05:15), Max: 36.7 (01-25-21 @ 15:59)  HR: 78 (01-26-21 @ 05:15) (78 - 85)  BP: 141/90 (01-26-21 @ 05:15) (141/90 - 188/123)  RR: 20 (01-26-21 @ 05:15) (20 - 24)  SpO2: 97% (01-26-21 @ 05:15) (97% - 100%)  Wt(kg): --Vital Signs Last 24 Hrs  T(C): 36.6 (26 Jan 2021 05:15), Max: 36.7 (25 Jan 2021 15:59)  T(F): 97.8 (26 Jan 2021 05:15), Max: 98.1 (25 Jan 2021 15:59)  HR: 78 (26 Jan 2021 05:15) (78 - 85)  BP: 141/90 (26 Jan 2021 05:15) (141/90 - 188/123)  BP(mean): 124 (26 Jan 2021 00:13) (124 - 141)  RR: 20 (26 Jan 2021 05:15) (20 - 24)  SpO2: 97% (26 Jan 2021 05:15) (97% - 100%)    PHYSICAL EXAM:  GENERAL: NAD, well-groomed, well-developed  HEAD:  Atraumatic, Normocephalic  EYES: EOMI, PERRLA, conjunctiva and sclera clear  ENMT: No tonsillar erythema, exudates, or enlargement; Moist mucous membranes, Good dentition, No lesions  NECK: Supple, No JVD, Normal thyroid  CHEST/LUNG: Clear to percussion bilaterally; No rales, rhonchi, wheezing, or rubs  HEART: Regular rate and rhythm; No murmurs, rubs, or gallops  ABDOMEN: Soft, Nontender, Nondistended; Bowel sounds present  EXTREMITIES:  2+ Peripheral Pulses, No clubbing, cyanosis, or edema  LYMPH: No lymphadenopathy noted  SKIN: No rashes or lesions  NERVOUS SYSTEM:  Alert & Oriented X3, Good concentration; Motor Strength 5/5 B/L upper and 5/5 left lower extremity 5/5 right lower extremity; DTRs 2+ intact and symmetric. Achilles reflex 1+ bl. Reported numbness over palpation of left hip and left foot and toes. Improved motor ability of left toes. Anal wink and rectal tone diminished/absent today.       LABS:                          14.3   13.49 )-----------( x        ( 27 Jan 2021 07:10 )             40.1                           13.0   9.50  )-----------( 214      ( 25 Jan 2021 18:26 )             37.7   01-27    137  |  99  |  15  ----------------------------<  109<H>  3.9   |  23  |  0.55    Ca    9.4      27 Jan 2021 07:32  Mg     2.2     01-27    TPro  7.3  /  Alb  4.0  /  TBili  0.3  /  DBili  x   /  AST  12  /  ALT  10  /  AlkPhos  78  01-25 01-25    141  |  104  |  17  ----------------------------<  86  4.2   |  25  |  0.60    Ca    9.1      25 Jan 2021 18:26    TPro  7.3  /  Alb  4.0  /  TBili  0.3  /  DBili  x   /  AST  12  /  ALT  10  /  AlkPhos  78  01-25        RADIOLOGY & ADDITIONAL TESTS:  < from: MR Lumbar Spine No Cont (01.25.21 @ 22:48) >  IMPRESSION:    Large central through left subarticular zone disc extrusion at L5-S1 occupying nearly the entire cross-sectional area the spinal cord and severely effacing the thecal sac and compressing the cauda equina nerve roots.    Additional multilevel spondylosis superimposed on congenital lumbar spinal canal narrowing, as described, with disc protrusion at L3-L4 resulting in moderate to severe central spinal canal stenosis and disc bulge at L4-L5 resulting in moderate central spinal canal stenosis.    < end of copied text >      Imaging Personally Reviewed:  [ ] YES  [ ] NO

## 2021-01-28 NOTE — DISCHARGE NOTE PROVIDER - CARE PROVIDER_API CALL
Shin Keane)  Orthopaedic Surgery  [address], [address]  [place] [address]  Phone: [phone]  Fax: [phone]  Follow Up Time: 1 week

## 2021-01-28 NOTE — PROGRESS NOTE ADULT - ASSESSMENT
35F w/ class III obesity s/p gastric sleeve(2019; 100lb weight loss), chronic lower back pain with sciatica, HTN, chronic urinary incontinence presents with back pain and numbness over buttocks, groin, and left foot after fall admitted to medicine for further management. Physical exam is significant for decreased left leg strength 4/5 and decreased sensation of left hip, left foot and toes. MRI demonstrates compression of the cauda equina nerve roots by disc at L5-S1.  Pain worsens on movement. Will continue with medical management and PT/OT. Pt is considering spine surgery. 35F w/ class III obesity s/p gastric sleeve(2019; 100lb weight loss), chronic lower back pain with sciatica, HTN, chronic urinary incontinence presents with back pain and numbness over buttocks, groin, and left foot after fall admitted to medicine for further management. Physical exam is significant for decreased left leg strength 4/5 and decreased sensation of left hip, left foot and toes. MRI demonstrates compression of the cauda equina nerve roots by disc at L5-S1.  Pain improving but worse with sitting. 35F w/ class III obesity s/p gastric sleeve(2019; 100lb weight loss), chronic lower back pain with sciatica, HTN, chronic urinary incontinence presents with back pain and numbness over buttocks, groin, and left foot after fall admitted to medicine for further management. Physical exam is significant for decreased left leg strength 4/5 and decreased sensation of left hip, left foot and toes. MRI demonstrates compression of the cauda equina nerve roots by disc at L5-S1. Neurological deficits and pain improving. Transitioned from IV steroids to medrol dose pack today.

## 2021-01-28 NOTE — CHART NOTE - NSCHARTNOTEFT_GEN_A_CORE
Patient seen and examined at bedside. This morning when the patient was using the bathroom to urinate, she also defecated without realizing it. When wiping, she was unable to feel what she was touching, and noticed decreased sensation in the Left perineal region extending into the Left side of her vagina. She says this incontinence has never happened before and says the numbness is getting worse.  She says when she sat to pee, the numbness radiated from the inside of her left leg to the outside of her left leg and down the back of her left leg to the mid calf region. She has been ambulating without pain and says the steroids made her feel better. On exam, she remains 5/5 in both upper and lower extremities, and has full sensation of bilateral lower and upper extremities, barring the Left perineal region and the lateral aspect of her Left foot. She has no upper motor neuron signs present. She has good rectal tone. Again, the risks and benefits of spine surgical intervention were discussed with the patient at length, and at this point, she does not know whether she wants surgery. She is mostly worried about the recovery and how it will change the way she has to care for her small children. She was educated on the planned procedure and will contemplate whether to undergo the procedure that will likely include a discectomy, possible laminectomy, possible posterior spinal instrumentation. Case discussed with Dr. Keane. Patient seen and examined at bedside. This morning when the patient was using the bathroom to urinate, she also defecated without realizing it. When wiping, she was unable to feel what she was touching, and noticed decreased sensation in the Left perineal region extending into the Left side of her vagina. She says this incontinence has never happened before and says the numbness is getting worse.  She says when she sat to pee, the numbness radiated from the inside of her left leg to the outside of her left leg and down the back of her left leg to the mid calf region. She has been ambulating without pain and says the steroids made her feel better. On exam, she remains 5/5 in both upper and lower extremities, and has full sensation of bilateral lower and upper extremities, barring the Left perineal region and the lateral aspect of her Left foot. She has no upper motor neuron signs present. She has good rectal tone. Again, the risks and benefits of spine surgical intervention were discussed with the patient at length, and at this point, she does not know whether she wants surgery. She is mostly worried about the recovery and how it will change the way she has to care for her small children. She was educated on the planned procedure and will contemplate whether to undergo the procedure that will likely include a discectomy, possible laminectomy, possible posterior spinal instrumentation. From an orthopedic surgery standpoint, we are recommending surgical intervention, especially with the change in neurologic symptoms. Case discussed with Dr. Keane.

## 2021-01-28 NOTE — PROGRESS NOTE ADULT - ATTENDING COMMENTS
-Patient seen/examined on 1/28/21. Case/plan discussed with the house staff as reviewed by me and in any comments below.  -Today with new fecal incontinence reported. I doubt this was due to the one dose of senna she took last night. She also reports new/worsening numbness in the perineal area.   -Orthopedics follow up appreciated. -Discussed with Dr. Keane. -Surgery is being recommended, but patient is still considering. -Will follow up.

## 2021-01-28 NOTE — PROVIDER CONTACT NOTE (OTHER) - SITUATION
patient BP elevated to 164/107
Blood Pressure remains  elevated
Pt's CBC was clotted.
Patient refused Labetalol IV ordered due to elevated BP. Patient verbalizes to take only tylenol tonight since she already took 4 BP medications today and all she needs is to rest.

## 2021-01-28 NOTE — PROVIDER CONTACT NOTE (OTHER) - ACTION/TREATMENT ORDERED:
Labetalol will be given as ordered. Will continue to monitor BP and new changes
hydrochlorothiazide po ordered. will recheck blood pressure
Will review with the team
Patient Counselling provided about importance  of managing elevated BP. Will continue to monitor

## 2021-01-28 NOTE — PROGRESS NOTE ADULT - ATTENDING COMMENTS
Patient seen and examined.  Patient reports 1x episode of fecal incontinence today while urinating.  No other incontinence since then.  No other changes in exam.  Again discussed with patient that surgery is the recommended treatment given her signs and symptoms of cauda equina syndrome to give her the best chance of recovery and hopefully prevent further neurologic decline.  The nature of the planned surgery, the options available to the patient, the risks and possible complications of the surgery including but not limited to death, paralysis, nerve damage,  infection,  bleeding,  failure of the surgery to relieve or improve her symptoms, the possibility of recurrent disc herniation, spinal instability or some other problem requiring further surgery in the future; the possibility of pulmonary and/or cardiac complications, wound healing complications, DVT, pulmonary embolus, spinal fluid leakage, adjacent spinal  level deterioration etc. etc were discussed at length with the patient who understands.  At this time she declines surgery again.  She will continue to consider surgery but is not ready to commit to it at this time.  Continue to monitor exam.  Continue decadron.

## 2021-01-28 NOTE — PROGRESS NOTE ADULT - PROBLEM SELECTOR PLAN 2
BP elevated on 1/26 and overnight to 180s/100s. Discussed about increased BP and that although it may be from pain and steroids we need  to control it. She refused amlodipine, metoprolol, labetalol because she says they don't work for her. Amendable to carvedilol.  - 1/28 BP improved overnight on carvedilol 6.25mg  - c/w home losartan  -increased HCTZ 1/26 to 50mg  -given IV hydralazine for elevated BP 1/27  -refused labetalol 1/27  -better pain control to lower BP BP elevated on 1/26 and overnight to 180s/100s. Discussed about increased BP and that although it may be from pain and steroids we need  to control it. She refused amlodipine, metoprolol, labetalol because she says they don't work for her. Amendable to carvedilol.  - 1/29 inc carvedilol from 6.25 to 12.5mg   - c/w home losartan  -increased HCTZ 1/26 to 50mg  -given IV hydralazine for elevated BP 1/27  -refused labetalol 1/27  -better pain control to lower BP BP elevated on 1/26 and overnight to 180s/100s. Discussed about increased BP and that although it may be from pain and steroids we need  to control it. She refused amlodipine, metoprolol, labetalol because she says they don't work for her. Amendable to carvedilol.  - 1/29 inc carvedilol from 6.25 to 12.5mg BID  -c/w home losartan 100 mg   -increased HCTZ 1/26 to 50mg  -pain control to lower BP

## 2021-01-28 NOTE — DISCHARGE NOTE PROVIDER - DETAILS OF MALNUTRITION DIAGNOSIS/DIAGNOSES
This patient has been assessed with a concern for Malnutrition and was treated during this hospitalization for the following Nutrition diagnosis/diagnoses:     -  02/01/2021: Morbid obesity (BMI > 40)   This patient has been assessed with a concern for Malnutrition and was treated during this hospitalization for the following Nutrition diagnosis/diagnoses:     -  02/01/2021: Morbid obesity (BMI > 40)    This patient has been assessed with a concern for Malnutrition and was treated during this hospitalization for the following Nutrition diagnosis/diagnoses:     -  02/01/2021: Morbid obesity (BMI > 40)

## 2021-01-28 NOTE — PROGRESS NOTE ADULT - PROBLEM SELECTOR PLAN 3
- s/p gastric sleeve  - continue with vitamin supplementation  - f/u B12, HbA1c, TSH levels - s/p gastric sleeve  - continue with vitamin supplementation  - b12, tsh, and A1C wnl

## 2021-01-28 NOTE — DISCHARGE NOTE PROVIDER - NSDCCPCAREPLAN_GEN_ALL_CORE_FT
PRINCIPAL DISCHARGE DIAGNOSIS  Diagnosis: Cauda equina compression  Assessment and Plan of Treatment: The nerves in your spinal cord were compressed by a lumbar disc when you fell. This is causing your leg burning and foot numbness. We gave you steroids to reduce inflammation and pain medications to help with pain. You will be given a rolling walker to help you walk. You should followup outpatient with spine orthopedist team in 1-2 weeks to discuss further treatment.       PRINCIPAL DISCHARGE DIAGNOSIS  Diagnosis: Cauda equina compression  Assessment and Plan of Treatment: The nerves in your spinal cord were compressed by a lumbar disc when you fell. This is causing your leg burning, groin numbness and foot numbness. We gave you steroids to reduce inflammation and pain medications to help with pain. You will be given a prescription for a rolling walker to help you walk. You should followup outpatient with spine orthopedist team in 1-2 weeks to discuss further treatment.      SECONDARY DISCHARGE DIAGNOSES  Diagnosis: Essential hypertension  Assessment and Plan of Treatment: Your blood pressure has been elevated while in the hospital for multiple reasons including pain and the steroids. We have increased your dose of hydrochlorothiazide and started you on a new medication carvedilol. You were given prescriptions for these medications.     PRINCIPAL DISCHARGE DIAGNOSIS  Diagnosis: Cauda equina compression  Assessment and Plan of Treatment: The nerves in your spinal cord were compressed by a lumbar disc when you fell. This is causing your leg burning, groin numbness and foot numbness. We gave you steroids to reduce inflammation and pain medications to help with pain. Orthopedics team determined that surgery was necessary and you had surgery while here in the hospital.      SECONDARY DISCHARGE DIAGNOSES  Diagnosis: Essential hypertension  Assessment and Plan of Treatment: Your blood pressure has been elevated while in the hospital for multiple reasons including pain and the steroids. We have increased your dose of hydrochlorothiazide and started you on a new medication carvedilol. You were given prescriptions for these medications.

## 2021-01-28 NOTE — PROGRESS NOTE ADULT - PROBLEM SELECTOR PLAN 8
DVT ppx: lovenox  istop#: [id number]   01/02/2021 01/07/2021 oxycodone hcl 5 mg tablet  6 2 [facility] #[id number]

## 2021-01-28 NOTE — DISCHARGE NOTE PROVIDER - NSDCMRMEDTOKEN_GEN_ALL_CORE_FT
Calcium 600+D 600 mg-200 intl units (5 mcg) oral tablet: 1 tab(s) orally once a day  Flexeril:   folic acid 1 mg oral tablet: 1 tab(s) orally once a day  losartan-hydrochlorothiazide 100 mg-25 mg oral tablet: 1 tab(s) orally once a day  meloxicam:   Multiple Vitamins oral tablet: 1 tab(s) orally once a day  oxyCODONE 5 mg oral tablet: 1 tab(s) orally 3 times a day, As Needed  Vitamin B12 1000 mcg oral tablet: 1 tab(s) orally once a day  Vitamin D3 50,000 intl units (1250 mcg) oral capsule: 1 cap(s) orally once a week   Calcium 600+D 600 mg-200 intl units (5 mcg) oral tablet: 1 tab(s) orally once a day  Flexeril:   folic acid 1 mg oral tablet: 1 tab(s) orally once a day  losartan-hydrochlorothiazide 100 mg-25 mg oral tablet: 1 tab(s) orally once a day  meloxicam:   Multiple Vitamins oral tablet: 1 tab(s) orally once a day  oxyCODONE 5 mg oral tablet: 1 tab(s) orally 3 times a day, As Needed  Rolling walker: VINICIO 99   R 53.1  Ht 165.1 cm  Wt 140.10 kg  Vitamin B12 1000 mcg oral tablet: 1 tab(s) orally once a day  Vitamin D3 50,000 intl units (1250 mcg) oral capsule: 1 cap(s) orally once a week   acetaminophen 325 mg oral tablet: 2 tab(s) orally every 6 hours, As Needed  ascorbic acid 500 mg oral tablet: 1 tab(s) orally 2 times a day  bisacodyl 5 mg oral delayed release tablet: 1 tab(s) orally every 12 hours  calcium-vitamin D 500 mg-200 intl units (5 mcg) oral tablet: 1 tab(s) orally 3 times a day  carvedilol 12.5 mg oral tablet: 1 tab(s) orally every 12 hours  cyclobenzaprine 10 mg oral tablet: 1 tab(s) orally every 8 hours  folic acid 1 mg oral tablet: 1 tab(s) orally once a day  hydroCHLOROthiazide 50 mg oral tablet: 1 tab(s) orally once a day  HYDROmorphone 2 mg oral tablet: 1 tab(s) orally every 4 hours, As needed, Moderate-Severe pain MDD:6  losartan 100 mg oral tablet: 1 tab(s) orally once a day  Multiple Vitamins oral tablet: 1 tab(s) orally once a day  pantoprazole 40 mg oral delayed release tablet: 1 tab(s) orally once a day (before a meal)  polyethylene glycol 3350 oral powder for reconstitution: 17 gram(s) orally 2 times a day  Rolling walker: VINICIO 99   R 53.1  Ht 165.1 cm  Wt 140.10 kg  senna oral tablet: 2 tab(s) orally once a day (at bedtime)  Vitamin B12 1000 mcg oral tablet: 1 tab(s) orally once a day  Vitamin D3 50,000 intl units (1250 mcg) oral capsule: 1 cap(s) orally once a week

## 2021-01-28 NOTE — PROGRESS NOTE ADULT - PROBLEM SELECTOR PLAN 1
Seen by Ortho Spine. CT lumbar spine documents significant stenosis. MR spine demonstrates compression of cauda equina nerve roots at L5-S1.   -will continue dexamethasone 10mg IV every 6hrs. 1/26-1/27 Pt refused dexamethasone due to increased BP. Told her that steroids are necessary for her injury. Will adjust BP meds and pain control.  - 1/28 Ortho transitioning patient to medrol dose pack for 1-2wks to prepare for discharge and to see her outpt in 1-2wks in their clinic to determine surgical needs  -IV tylenol for pain  - oxy 5mg for moderate, oxy 10mg for severe  -started on duloxetine 30mg for pain 1/26  -refused gabapentin, flexirol for pain  -PT/OT follwowing Seen by Ortho Spine. CT lumbar spine documents significant stenosis. MR spine demonstrates compression of cauda equina nerve roots at L5-S1.   - 1/28 transitioned to medrol dose pack as per spine recs and preparation for d/c. will f/u outpt with ortho in clinic  - 1/28 d/c dexamethasone 10mg IV every 6hrs. 1/26-1/27 Pt refused dexamethasone due to increased BP. Told her that steroids are necessary for her injury. Will adjust BP meds and pain control.  -IV tylenol for pain  - oxy 5mg for moderate, oxy 10mg for severe  -started on duloxetine 30mg for pain 1/26  -refused gabapentin, flexirol for pain  -PT/OT following Seen by Ortho Spine. CT lumbar spine documents significant stenosis. MR spine demonstrates compression of cauda equina nerve roots at L5-S1.   - 1/28 transitioned to medrol dose pack as per spine recs and preparation for d/c. will f/u outpt with ortho in clinic 1-2 weeks   - ortho f/u requested for diminishing rectal tone, per ortho, continue with plan as above  - will hold all stool softeners and monitor for worsening stool incontinence and plan for d/c tomorrow if stable   -IV tylenol for pain, oxy 5mg for moderate, oxy 10mg for severe  -c/w duloxetine 30mg for pain (refused gabapentin and flexeril)   -PT/OT following, d/c home w/ rolling walker, no needs

## 2021-01-29 ENCOUNTER — TRANSCRIPTION ENCOUNTER (OUTPATIENT)
Age: 36
End: 2021-01-29

## 2021-01-29 LAB
ALBUMIN SERPL ELPH-MCNC: 4.3 G/DL — SIGNIFICANT CHANGE UP (ref 3.3–5)
ALP SERPL-CCNC: 79 U/L — SIGNIFICANT CHANGE UP (ref 40–120)
ALT FLD-CCNC: 8 U/L — LOW (ref 10–45)
ANION GAP SERPL CALC-SCNC: 12 MMOL/L — SIGNIFICANT CHANGE UP (ref 5–17)
APTT BLD: 35.3 SEC — SIGNIFICANT CHANGE UP (ref 27.5–35.5)
AST SERPL-CCNC: 9 U/L — LOW (ref 10–40)
BASOPHILS # BLD AUTO: 0 K/UL — SIGNIFICANT CHANGE UP (ref 0–0.2)
BASOPHILS NFR BLD AUTO: 0 % — SIGNIFICANT CHANGE UP (ref 0–2)
BILIRUB SERPL-MCNC: 0.6 MG/DL — SIGNIFICANT CHANGE UP (ref 0.2–1.2)
BLD GP AB SCN SERPL QL: NEGATIVE — SIGNIFICANT CHANGE UP
BUN SERPL-MCNC: 25 MG/DL — HIGH (ref 7–23)
CALCIUM SERPL-MCNC: 9.2 MG/DL — SIGNIFICANT CHANGE UP (ref 8.4–10.5)
CHLORIDE SERPL-SCNC: 99 MMOL/L — SIGNIFICANT CHANGE UP (ref 96–108)
CO2 SERPL-SCNC: 25 MMOL/L — SIGNIFICANT CHANGE UP (ref 22–31)
CREAT SERPL-MCNC: 0.67 MG/DL — SIGNIFICANT CHANGE UP (ref 0.5–1.3)
EOSINOPHIL # BLD AUTO: 0.2 K/UL — SIGNIFICANT CHANGE UP (ref 0–0.5)
EOSINOPHIL NFR BLD AUTO: 1.7 % — SIGNIFICANT CHANGE UP (ref 0–6)
GLUCOSE SERPL-MCNC: 93 MG/DL — SIGNIFICANT CHANGE UP (ref 70–99)
HCT VFR BLD CALC: 44.5 % — SIGNIFICANT CHANGE UP (ref 34.5–45)
HGB BLD-MCNC: 15.7 G/DL — HIGH (ref 11.5–15.5)
INR BLD: 1.04 RATIO — SIGNIFICANT CHANGE UP (ref 0.88–1.16)
LYMPHOCYTES # BLD AUTO: 51.3 % — HIGH (ref 13–44)
LYMPHOCYTES # BLD AUTO: 6 K/UL — HIGH (ref 1–3.3)
MAGNESIUM SERPL-MCNC: 2.3 MG/DL — SIGNIFICANT CHANGE UP (ref 1.6–2.6)
MANUAL SMEAR VERIFICATION: SIGNIFICANT CHANGE UP
MCHC RBC-ENTMCNC: 29.6 PG — SIGNIFICANT CHANGE UP (ref 27–34)
MCHC RBC-ENTMCNC: 35.3 GM/DL — SIGNIFICANT CHANGE UP (ref 32–36)
MCV RBC AUTO: 84 FL — SIGNIFICANT CHANGE UP (ref 80–100)
MONOCYTES # BLD AUTO: 0.61 K/UL — SIGNIFICANT CHANGE UP (ref 0–0.9)
MONOCYTES NFR BLD AUTO: 5.2 % — SIGNIFICANT CHANGE UP (ref 2–14)
NEUTROPHILS # BLD AUTO: 4.78 K/UL — SIGNIFICANT CHANGE UP (ref 1.8–7.4)
NEUTROPHILS NFR BLD AUTO: 40.9 % — LOW (ref 43–77)
PHOSPHATE SERPL-MCNC: 4 MG/DL — SIGNIFICANT CHANGE UP (ref 2.5–4.5)
PLAT MORPH BLD: NORMAL — SIGNIFICANT CHANGE UP
PLATELET # BLD AUTO: 281 K/UL — SIGNIFICANT CHANGE UP (ref 150–400)
POTASSIUM SERPL-MCNC: 3.7 MMOL/L — SIGNIFICANT CHANGE UP (ref 3.5–5.3)
POTASSIUM SERPL-SCNC: 3.7 MMOL/L — SIGNIFICANT CHANGE UP (ref 3.5–5.3)
PROT SERPL-MCNC: 7.4 G/DL — SIGNIFICANT CHANGE UP (ref 6–8.3)
PROTHROM AB SERPL-ACNC: 12.5 SEC — SIGNIFICANT CHANGE UP (ref 10.6–13.6)
RBC # BLD: 5.3 M/UL — HIGH (ref 3.8–5.2)
RBC # FLD: 13.9 % — SIGNIFICANT CHANGE UP (ref 10.3–14.5)
RBC BLD AUTO: ABNORMAL
RH IG SCN BLD-IMP: POSITIVE — SIGNIFICANT CHANGE UP
SODIUM SERPL-SCNC: 136 MMOL/L — SIGNIFICANT CHANGE UP (ref 135–145)
TARGETS BLD QL SMEAR: SIGNIFICANT CHANGE UP
VARIANT LYMPHS # BLD: 0.9 % — SIGNIFICANT CHANGE UP (ref 0–6)
WBC # BLD: 11.69 K/UL — HIGH (ref 3.8–10.5)
WBC # FLD AUTO: 11.69 K/UL — HIGH (ref 3.8–10.5)

## 2021-01-29 PROCEDURE — 71045 X-RAY EXAM CHEST 1 VIEW: CPT | Mod: 26

## 2021-01-29 PROCEDURE — 99233 SBSQ HOSP IP/OBS HIGH 50: CPT | Mod: GC

## 2021-01-29 RX ORDER — SODIUM CHLORIDE 9 MG/ML
1000 INJECTION INTRAMUSCULAR; INTRAVENOUS; SUBCUTANEOUS
Refills: 0 | Status: DISCONTINUED | OUTPATIENT
Start: 2021-01-30 | End: 2021-01-31

## 2021-01-29 RX ADMIN — Medication 4 MILLIGRAM(S): at 18:00

## 2021-01-29 RX ADMIN — Medication 4 MILLIGRAM(S): at 13:12

## 2021-01-29 RX ADMIN — Medication 1 MILLIGRAM(S): at 13:12

## 2021-01-29 RX ADMIN — CARVEDILOL PHOSPHATE 12.5 MILLIGRAM(S): 80 CAPSULE, EXTENDED RELEASE ORAL at 18:00

## 2021-01-29 RX ADMIN — Medication 650 MILLIGRAM(S): at 13:10

## 2021-01-29 RX ADMIN — PANTOPRAZOLE SODIUM 40 MILLIGRAM(S): 20 TABLET, DELAYED RELEASE ORAL at 06:20

## 2021-01-29 RX ADMIN — Medication 1 TABLET(S): at 13:12

## 2021-01-29 RX ADMIN — Medication 650 MILLIGRAM(S): at 06:20

## 2021-01-29 RX ADMIN — Medication 650 MILLIGRAM(S): at 00:31

## 2021-01-29 RX ADMIN — DULOXETINE HYDROCHLORIDE 30 MILLIGRAM(S): 30 CAPSULE, DELAYED RELEASE ORAL at 13:11

## 2021-01-29 RX ADMIN — OXYCODONE HYDROCHLORIDE 10 MILLIGRAM(S): 5 TABLET ORAL at 08:57

## 2021-01-29 RX ADMIN — CARVEDILOL PHOSPHATE 12.5 MILLIGRAM(S): 80 CAPSULE, EXTENDED RELEASE ORAL at 06:30

## 2021-01-29 RX ADMIN — PREGABALIN 1000 MICROGRAM(S): 225 CAPSULE ORAL at 13:11

## 2021-01-29 RX ADMIN — Medication 8 MILLIGRAM(S): at 21:40

## 2021-01-29 RX ADMIN — Medication 4 MILLIGRAM(S): at 06:21

## 2021-01-29 RX ADMIN — Medication 1 TABLET(S): at 13:11

## 2021-01-29 RX ADMIN — LOSARTAN POTASSIUM 100 MILLIGRAM(S): 100 TABLET, FILM COATED ORAL at 06:20

## 2021-01-29 RX ADMIN — Medication 650 MILLIGRAM(S): at 23:40

## 2021-01-29 RX ADMIN — OXYCODONE HYDROCHLORIDE 5 MILLIGRAM(S): 5 TABLET ORAL at 23:40

## 2021-01-29 RX ADMIN — Medication 650 MILLIGRAM(S): at 18:00

## 2021-01-29 RX ADMIN — Medication 50 MILLIGRAM(S): at 06:20

## 2021-01-29 NOTE — PROGRESS NOTE ADULT - ATTENDING COMMENTS
-Patient seen/examined on 1/29/21. Case/plan discussed with the house staff as reviewed by me and in any comments below.  -Ortho recs appreciated.   -Please see risk stratification note from today. -Patient seen/examined on 1/29/21. Case/plan discussed with the house staff as reviewed by me and in any comments below.  -Smoking cessation counseling provided. Patient would be interested in NRT prescription upon discharge.   -Ortho recs appreciated.   -Please see risk stratification note from today.

## 2021-01-29 NOTE — PROGRESS NOTE ADULT - ASSESSMENT
A/P: 35F with a large L5-S1 HNP, now is amenable for surgery  - Pain control - Medrol dosepack  - WBAT  - DVT ppx per primary team  - OR plan to be discussed with Dr. Keane this morning  - Will need documented medical clearance prior to the OR  - NPO/IVF

## 2021-01-29 NOTE — CHART NOTE - NSCHARTNOTEFT_GEN_A_CORE
Pre-operative medical assessment:    Internal Medicine resident spoke with Orthopedic resident regarding Ms. Cramer's need for ortho spinal procedure scheduled for tomorrow and need for medical assessment/risk-stratification of pt prior to the intervention.    Pt is medically optimized for procedure as the most acute medical concern is spinal nerve compression, with the definitive management being addressed by the procedure in question (scheduled for 1/30/2021 with orthopedics)    RCRI = 0; Class I risk; ~3.9% 30-day risk of death, MI, or cardiac arrest    Given the need for nerve decompression via this procedure, the risk of intra or postoperative complication is outweighed by the benefit of definitive clinical management and prevention of progression of neurological sequelae.    Provider discussed case and considerations with ortho resident. Pt is not currently on AC or insulin. Pre-operative medical assessment:    Internal Medicine resident spoke with Orthopedic resident regarding Ms. Cramer's need for ortho spinal procedure scheduled for tomorrow and need for medical assessment/risk-stratification of pt prior to the intervention.    Pt is medically optimized for procedure as the most acute medical concern is spinal nerve compression, with the definitive management being addressed by the procedure in question (scheduled for 1/30/2021 with orthopedics)    RCRI = 0; Class I risk; ~3.9% 30-day risk of death, MI, or cardiac arrest    Given the need for nerve decompression via this procedure, the risk of intra or postoperative complication is outweighed by the benefit of definitive clinical management and prevention of progression of neurological sequelae.    Provider discussed case and considerations with ortho resident. Pt is not currently on AC or insulin. Hemodynamics are stable and previously demonstrated hypertension is well-controlled. Leukocytosis on labs likely 2/2 steroid induced dematerialization of PMNs and is unlikely to reflect any active infection concerns. Pre-operative medical assessment:    Internal Medicine resident spoke with Orthopedic resident regarding Ms. Cramer's need for ortho spinal procedure scheduled for tomorrow and need for medical assessment/risk-stratification of pt prior to the intervention.    Pt is medically optimized for procedure as the most acute medical concern is spinal nerve compression, with the definitive management being addressed by the procedure in question (scheduled for 1/30/2021 with orthopedics)    RCRI = 0; Class I risk; ~3.9% 30-day risk of death, MI, or cardiac arrest    Given the need for nerve decompression via this procedure, the risk of intra or postoperative complication is outweighed by the benefit of definitive clinical management and prevention of progression of neurological sequelae.    Provider discussed case and considerations with ortho resident. Pt is not currently on AC or insulin. Hemodynamics are stable and previously demonstrated hypertension is well-controlled. Leukocytosis on labs likely 2/2 steroid induced dematerialization of PMNs and is unlikely to reflect any active infection concerns.    ATTENDING ADDENDUM:    -Patient seen/examined on 1/29/21. Case/plan discussed with the house staff as reviewed by me. In addition to the above; -Risk factors reviewed. Obesity being her main risk factor. BP now better controlled with anti-hypertensives. EKG reviewed. -Patient is otherwise low risk for an intermediate risk procedure.   -Ortho recs appreciated. Concern that medical/conservative management will be suboptimal. Patient aware of risks and in agreement with plan. Pre-operative medical assessment:    Internal Medicine resident spoke with Orthopedic resident regarding Ms. Cramer's need for ortho spinal procedure scheduled for tomorrow and need for medical assessment/risk-stratification of pt prior to the intervention.    Pt is medically optimized for procedure as the most acute medical concern is spinal nerve compression, with the definitive management being addressed by the procedure in question (scheduled for 1/30/2021 with orthopedics)    RCRI = 0; Class I risk; ~3.9% 30-day risk of death, MI, or cardiac arrest    Given the need for nerve decompression via this procedure, the risk of intra or postoperative complication is outweighed by the benefit of definitive clinical management and prevention of progression of neurological sequelae.    Provider discussed case and considerations with ortho resident. Pt is not currently on AC or insulin. Hemodynamics are stable and previously demonstrated hypertension is well-controlled. Leukocytosis on labs likely 2/2 steroid induced dematerialization of PMNs and is unlikely to reflect any active infection concerns.    ATTENDING ADDENDUM:    -Patient seen/examined on 1/29/21. Case/plan discussed with the house staff as reviewed by me. In addition to the above; -Risk factors reviewed. Obesity being her main risk factor. BP now better controlled with anti-hypertensives. EKG reviewed. -Patient is otherwise low risk for an intermediate risk procedure.   -Ortho recs appreciated. Concern that medical/conservative management will be suboptimal. Patient aware of risks/benefits and in agreement with plan.

## 2021-01-29 NOTE — PROGRESS NOTE ADULT - PROBLEM SELECTOR PLAN 5
Has not had bowel movement since admission. Refused bowel regimen. Will re-discuss with patient.  -had incontinence of stool this AM  -hold senna and miralax

## 2021-01-29 NOTE — PROGRESS NOTE ADULT - PROBLEM SELECTOR PLAN 8
DVT ppx: lovenox  istop#: [id number]   01/02/2021 01/07/2021 oxycodone hcl 5 mg tablet  6 2 [facility] #[id number]

## 2021-01-29 NOTE — PROGRESS NOTE ADULT - SUBJECTIVE AND OBJECTIVE BOX
PROGRESS NOTE:     CONTACT INFO:  Ulises Serrano MD (Resident Physician - PGY-1 - Internal Medicine)  [email or web address]  Pager: [phone] (any site) or [phone] ([facility] only)    Patient is a 35y old  Female who presents with a chief complaint of 35F p/w back pain and numbness over buttocks, groin, and left foot after fall (29 Jan 2021 07:30)      SUBJECTIVE / OVERNIGHT EVENTS:  No acute events overnight. Patient seen and evaluated at bedside. She wants to eat as she has a virtual court proceeding today and states that she is unwilling to have surgery today unless it can be delayed until much later in the day. Issue to be discussed with orthoteam; decision to delay surgery until tomorrow was reached. No fever/chills.  Denies SOB at rest, chest pain, palpitations, abdominal pain, nausea/vomiting. Neurological symptoms persist w/o significant change.     ADDITIONAL REVIEW OF SYSTEMS:    MEDICATIONS  (STANDING):  acetaminophen   Tablet .. 650 milliGRAM(s) Oral every 6 hours  calcium carbonate 1250 mG  + Vitamin D (OsCal 500 + D) 1 Tablet(s) Oral daily  carvedilol 12.5 milliGRAM(s) Oral every 12 hours  cyanocobalamin 1000 MICROGram(s) Oral daily  DULoxetine 30 milliGRAM(s) Oral daily  folic acid 1 milliGRAM(s) Oral daily  hydrochlorothiazide 50 milliGRAM(s) Oral daily  losartan 100 milliGRAM(s) Oral daily  methylPREDNISolone   Oral   methylPREDNISolone 4 milliGRAM(s) Oral before breakfast  methylPREDNISolone 4 milliGRAM(s) Oral after lunch  methylPREDNISolone 4 milliGRAM(s) Oral after dinner  methylPREDNISolone 8 milliGRAM(s) Oral at bedtime  multivitamin 1 Tablet(s) Oral daily  pantoprazole    Tablet 40 milliGRAM(s) Oral before breakfast    MEDICATIONS  (PRN):  oxyCODONE    IR 5 milliGRAM(s) Oral every 4 hours PRN Moderate Pain (4 - 6)  oxyCODONE    IR 10 milliGRAM(s) Oral every 4 hours PRN Severe Pain (7 - 10)      CAPILLARY BLOOD GLUCOSE        I&O's Summary    28 Jan 2021 07:01  -  29 Jan 2021 07:00  --------------------------------------------------------  IN: 380 mL / OUT: 0 mL / NET: 380 mL        PHYSICAL EXAM:  Vital Signs Last 24 Hrs  T(C): 36.5 (29 Jan 2021 14:02), Max: 36.7 (28 Jan 2021 20:11)  T(F): 97.7 (29 Jan 2021 14:02), Max: 98 (28 Jan 2021 20:11)  HR: 76 (29 Jan 2021 18:01) (69 - 90)  BP: 130/91 (29 Jan 2021 18:01) (122/82 - 155/100)  BP(mean): --  RR: 18 (29 Jan 2021 14:02) (16 - 19)  SpO2: 98% (29 Jan 2021 14:02) (96% - 98%)    GENERAL: NAD, well-groomed, well-developed  HEAD:  Atraumatic, Normocephalic  EYES: EOMI, PERRLA, conjunctiva and sclera clear  ENMT: Moist mucous membranes, Good dentition, No lesions  NECK: Supple, No JVD  CHEST/LUNG: Clear to percussion bilaterally; No rales, rhonchi, wheezing, or rubs  HEART: Regular rate and rhythm; No murmurs, rubs, or gallops  ABDOMEN: Soft, Nontender, Nondistended; Bowel sounds present  EXTREMITIES:  2+ Peripheral Pulses, No clubbing, cyanosis, or edema  SKIN: No rashes or lesions  NERVOUS SYSTEM:  Alert & Oriented X3, Good concentration; Motor Strength 5/5 B/L upper and 5/5 left lower extremity 5/5 right lower extremity; DTRs 2+ intact and symmetric. Achilles reflex symmetric and present bilaterally. Reported numbness over palpation of left hip and left foot and toes. Pt able to wiggle BL toes, but left leg abilities diminished vs right    LABS:                        15.7   11.69 )-----------( 281      ( 29 Jan 2021 07:03 )             44.5     01-29    136  |  99  |  25<H>  ----------------------------<  93  3.7   |  25  |  0.67    Ca    9.2      29 Jan 2021 07:10  Phos  4.0     01-29  Mg     2.3     01-29    TPro  7.4  /  Alb  4.3  /  TBili  0.6  /  DBili  x   /  AST  9<L>  /  ALT  8<L>  /  AlkPhos  79  01-29    PT/INR - ( 29 Jan 2021 07:14 )   PT: 12.5 sec;   INR: 1.04 ratio         PTT - ( 29 Jan 2021 07:14 )  PTT:35.3 sec            RADIOLOGY & ADDITIONAL TESTS:  Results Reviewed:   Imaging Personally Reviewed:  Electrocardiogram Personally Reviewed:    COORDINATION OF CARE:  Care Discussed with Consultants/Other Providers [Y/N]:  Prior or Outpatient Records Reviewed [Y/N]:

## 2021-01-29 NOTE — CHART NOTE - NSCHARTNOTEFT_GEN_A_CORE
Patient opting for surgical management tomorrow, 1/30 with Dr. Keane for L5-S1 lami/PSF.  - NPO past midnight  - IVF while NPO  - hold DVT ppx  - AM preop labs  - consent in chart  - medical clearance pending

## 2021-01-29 NOTE — PROGRESS NOTE ADULT - ASSESSMENT
35F w/ class III obesity s/p gastric sleeve(2019; 100lb weight loss), chronic lower back pain with sciatica, HTN, chronic urinary incontinence presents with back pain and numbness over buttocks, groin, and left foot after fall admitted to medicine for further management. Physical exam is significant for decreased left leg strength 4/5 and decreased sensation of left hip, left foot and toes. MRI demonstrates compression of the cauda equina nerve roots by disc at L5-S1. Neurological deficits and pain improving. Transitioned from IV steroids to medrol dose pack today.  35F w/ class III obesity s/p gastric sleeve(2019; 100lb weight loss), chronic lower back pain with sciatica, HTN, chronic urinary incontinence presents with back pain and numbness over buttocks, groin, and left foot after fall admitted to medicine for further management. Physical exam is significant for decreased left leg strength 4/5 and decreased sensation of left hip, left foot and toes. MRI demonstrates compression of the cauda equina nerve roots by disc at L5-S1. Neurological deficits and pain improving. Transitioned from IV steroids to medrol dose pack today.     1/29 - Unchanged exam. Pt declines surgery today; amenable to surgery tomorrow; pt made NPO after midnight and is purportedly scheduled for tomorrow

## 2021-01-29 NOTE — PROGRESS NOTE ADULT - PROBLEM SELECTOR PLAN 1
Seen by Ortho Spine. CT lumbar spine documents significant stenosis. MR spine demonstrates compression of cauda equina nerve roots at L5-S1.   - 1/28 transitioned to medrol dose pack as per spine recs and preparation for d/c. will f/u outpt with ortho in clinic 1-2 weeks   - ortho f/u requested for diminishing rectal tone, per ortho, continue with plan as above  - will hold all stool softeners and monitor for worsening stool incontinence and plan for d/c tomorrow if stable   -IV tylenol for pain, oxy 5mg for moderate, oxy 10mg for severe  -c/w duloxetine 30mg for pain (refused gabapentin and flexeril)   -PT/OT following, d/c home w/ rolling walker, no needs

## 2021-01-30 ENCOUNTER — RESULT REVIEW (OUTPATIENT)
Age: 36
End: 2021-01-30

## 2021-01-30 LAB
ANION GAP SERPL CALC-SCNC: 9 MMOL/L — SIGNIFICANT CHANGE UP (ref 5–17)
APTT BLD: 36.4 SEC — HIGH (ref 27.5–35.5)
BLD GP AB SCN SERPL QL: NEGATIVE — SIGNIFICANT CHANGE UP
BUN SERPL-MCNC: 24 MG/DL — HIGH (ref 7–23)
CALCIUM SERPL-MCNC: 8.8 MG/DL — SIGNIFICANT CHANGE UP (ref 8.4–10.5)
CHLORIDE SERPL-SCNC: 99 MMOL/L — SIGNIFICANT CHANGE UP (ref 96–108)
CO2 SERPL-SCNC: 28 MMOL/L — SIGNIFICANT CHANGE UP (ref 22–31)
CREAT SERPL-MCNC: 0.61 MG/DL — SIGNIFICANT CHANGE UP (ref 0.5–1.3)
GLUCOSE SERPL-MCNC: 100 MG/DL — HIGH (ref 70–99)
HCG UR QL: NEGATIVE — SIGNIFICANT CHANGE UP
HCT VFR BLD CALC: 43.6 % — SIGNIFICANT CHANGE UP (ref 34.5–45)
HGB BLD-MCNC: 15.4 G/DL — SIGNIFICANT CHANGE UP (ref 11.5–15.5)
INR BLD: 1.11 RATIO — SIGNIFICANT CHANGE UP (ref 0.88–1.16)
MCHC RBC-ENTMCNC: 30.1 PG — SIGNIFICANT CHANGE UP (ref 27–34)
MCHC RBC-ENTMCNC: 35.3 GM/DL — SIGNIFICANT CHANGE UP (ref 32–36)
MCV RBC AUTO: 85.3 FL — SIGNIFICANT CHANGE UP (ref 80–100)
NRBC # BLD: 0 /100 WBCS — SIGNIFICANT CHANGE UP (ref 0–0)
PLATELET # BLD AUTO: 277 K/UL — SIGNIFICANT CHANGE UP (ref 150–400)
POTASSIUM SERPL-MCNC: 4.1 MMOL/L — SIGNIFICANT CHANGE UP (ref 3.5–5.3)
POTASSIUM SERPL-SCNC: 4.1 MMOL/L — SIGNIFICANT CHANGE UP (ref 3.5–5.3)
PROTHROM AB SERPL-ACNC: 13.2 SEC — SIGNIFICANT CHANGE UP (ref 10.6–13.6)
RBC # BLD: 5.11 M/UL — SIGNIFICANT CHANGE UP (ref 3.8–5.2)
RBC # FLD: 13.9 % — SIGNIFICANT CHANGE UP (ref 10.3–14.5)
RH IG SCN BLD-IMP: POSITIVE — SIGNIFICANT CHANGE UP
SODIUM SERPL-SCNC: 136 MMOL/L — SIGNIFICANT CHANGE UP (ref 135–145)
WBC # BLD: 12.38 K/UL — HIGH (ref 3.8–10.5)
WBC # FLD AUTO: 12.38 K/UL — HIGH (ref 3.8–10.5)

## 2021-01-30 PROCEDURE — 63030 LAMOT DCMPRN NRV RT 1 LMBR: CPT | Mod: 22,59

## 2021-01-30 PROCEDURE — 88304 TISSUE EXAM BY PATHOLOGIST: CPT | Mod: 26

## 2021-01-30 PROCEDURE — 63011 REMOVE SPINE LAMINA 1/2 SCRL: CPT | Mod: 22

## 2021-01-30 PROCEDURE — 72020 X-RAY EXAM OF SPINE 1 VIEW: CPT | Mod: 26

## 2021-01-30 PROCEDURE — 99233 SBSQ HOSP IP/OBS HIGH 50: CPT | Mod: GC

## 2021-01-30 PROCEDURE — 63047 LAM FACETEC & FORAMOT LUMBAR: CPT | Mod: 22

## 2021-01-30 RX ORDER — CEFAZOLIN SODIUM 1 G
3000 VIAL (EA) INJECTION EVERY 8 HOURS
Refills: 0 | Status: COMPLETED | OUTPATIENT
Start: 2021-01-30 | End: 2021-01-31

## 2021-01-30 RX ORDER — OXYCODONE HYDROCHLORIDE 5 MG/1
5 TABLET ORAL EVERY 4 HOURS
Refills: 0 | Status: DISCONTINUED | OUTPATIENT
Start: 2021-01-30 | End: 2021-02-02

## 2021-01-30 RX ORDER — ONDANSETRON 8 MG/1
4 TABLET, FILM COATED ORAL EVERY 6 HOURS
Refills: 0 | Status: DISCONTINUED | OUTPATIENT
Start: 2021-01-30 | End: 2021-02-03

## 2021-01-30 RX ORDER — OXYCODONE HYDROCHLORIDE 5 MG/1
10 TABLET ORAL EVERY 4 HOURS
Refills: 0 | Status: DISCONTINUED | OUTPATIENT
Start: 2021-01-30 | End: 2021-02-02

## 2021-01-30 RX ORDER — ASCORBIC ACID 60 MG
500 TABLET,CHEWABLE ORAL
Refills: 0 | Status: DISCONTINUED | OUTPATIENT
Start: 2021-01-30 | End: 2021-02-03

## 2021-01-30 RX ORDER — CYCLOBENZAPRINE HYDROCHLORIDE 10 MG/1
10 TABLET, FILM COATED ORAL EVERY 8 HOURS
Refills: 0 | Status: DISCONTINUED | OUTPATIENT
Start: 2021-01-30 | End: 2021-02-03

## 2021-01-30 RX ORDER — FOLIC ACID 0.8 MG
1 TABLET ORAL DAILY
Refills: 0 | Status: DISCONTINUED | OUTPATIENT
Start: 2021-01-30 | End: 2021-02-03

## 2021-01-30 RX ORDER — SENNA PLUS 8.6 MG/1
2 TABLET ORAL AT BEDTIME
Refills: 0 | Status: DISCONTINUED | OUTPATIENT
Start: 2021-01-30 | End: 2021-02-03

## 2021-01-30 RX ORDER — SODIUM CHLORIDE 9 MG/ML
1000 INJECTION, SOLUTION INTRAVENOUS
Refills: 0 | Status: DISCONTINUED | OUTPATIENT
Start: 2021-01-30 | End: 2021-01-31

## 2021-01-30 RX ORDER — SENNA PLUS 8.6 MG/1
2 TABLET ORAL AT BEDTIME
Refills: 0 | Status: DISCONTINUED | OUTPATIENT
Start: 2021-01-30 | End: 2021-01-30

## 2021-01-30 RX ORDER — MAGNESIUM HYDROXIDE 400 MG/1
30 TABLET, CHEWABLE ORAL EVERY 12 HOURS
Refills: 0 | Status: DISCONTINUED | OUTPATIENT
Start: 2021-01-30 | End: 2021-02-03

## 2021-01-30 RX ORDER — ACETAMINOPHEN 500 MG
650 TABLET ORAL EVERY 6 HOURS
Refills: 0 | Status: DISCONTINUED | OUTPATIENT
Start: 2021-01-30 | End: 2021-02-03

## 2021-01-30 RX ORDER — HYDROMORPHONE HYDROCHLORIDE 2 MG/ML
0.5 INJECTION INTRAMUSCULAR; INTRAVENOUS; SUBCUTANEOUS EVERY 6 HOURS
Refills: 0 | Status: DISCONTINUED | OUTPATIENT
Start: 2021-01-30 | End: 2021-02-02

## 2021-01-30 RX ORDER — HYDROMORPHONE HYDROCHLORIDE 2 MG/ML
0.5 INJECTION INTRAMUSCULAR; INTRAVENOUS; SUBCUTANEOUS
Refills: 0 | Status: DISCONTINUED | OUTPATIENT
Start: 2021-01-30 | End: 2021-01-30

## 2021-01-30 RX ORDER — ONDANSETRON 8 MG/1
4 TABLET, FILM COATED ORAL ONCE
Refills: 0 | Status: DISCONTINUED | OUTPATIENT
Start: 2021-01-30 | End: 2021-01-30

## 2021-01-30 RX ADMIN — Medication 4 MILLIGRAM(S): at 12:01

## 2021-01-30 RX ADMIN — Medication 650 MILLIGRAM(S): at 12:01

## 2021-01-30 RX ADMIN — Medication 1 TABLET(S): at 12:01

## 2021-01-30 RX ADMIN — Medication 1 MILLIGRAM(S): at 12:01

## 2021-01-30 RX ADMIN — Medication 650 MILLIGRAM(S): at 22:01

## 2021-01-30 RX ADMIN — Medication 50 MILLIGRAM(S): at 05:30

## 2021-01-30 RX ADMIN — PREGABALIN 1000 MICROGRAM(S): 225 CAPSULE ORAL at 12:01

## 2021-01-30 RX ADMIN — PANTOPRAZOLE SODIUM 40 MILLIGRAM(S): 20 TABLET, DELAYED RELEASE ORAL at 05:30

## 2021-01-30 RX ADMIN — CARVEDILOL PHOSPHATE 12.5 MILLIGRAM(S): 80 CAPSULE, EXTENDED RELEASE ORAL at 05:30

## 2021-01-30 RX ADMIN — SENNA PLUS 2 TABLET(S): 8.6 TABLET ORAL at 21:54

## 2021-01-30 RX ADMIN — CARVEDILOL PHOSPHATE 12.5 MILLIGRAM(S): 80 CAPSULE, EXTENDED RELEASE ORAL at 21:55

## 2021-01-30 RX ADMIN — Medication 650 MILLIGRAM(S): at 05:29

## 2021-01-30 RX ADMIN — SODIUM CHLORIDE 100 MILLILITER(S): 9 INJECTION INTRAMUSCULAR; INTRAVENOUS; SUBCUTANEOUS at 00:01

## 2021-01-30 RX ADMIN — Medication 1 TABLET(S): at 21:55

## 2021-01-30 RX ADMIN — HYDROMORPHONE HYDROCHLORIDE 0.5 MILLIGRAM(S): 2 INJECTION INTRAMUSCULAR; INTRAVENOUS; SUBCUTANEOUS at 19:00

## 2021-01-30 RX ADMIN — DULOXETINE HYDROCHLORIDE 30 MILLIGRAM(S): 30 CAPSULE, DELAYED RELEASE ORAL at 12:01

## 2021-01-30 RX ADMIN — Medication 200 MILLIGRAM(S): at 21:56

## 2021-01-30 RX ADMIN — HYDROMORPHONE HYDROCHLORIDE 0.5 MILLIGRAM(S): 2 INJECTION INTRAMUSCULAR; INTRAVENOUS; SUBCUTANEOUS at 19:30

## 2021-01-30 RX ADMIN — LOSARTAN POTASSIUM 100 MILLIGRAM(S): 100 TABLET, FILM COATED ORAL at 05:30

## 2021-01-30 RX ADMIN — Medication 4 MILLIGRAM(S): at 05:30

## 2021-01-30 RX ADMIN — CYCLOBENZAPRINE HYDROCHLORIDE 10 MILLIGRAM(S): 10 TABLET, FILM COATED ORAL at 21:55

## 2021-01-30 RX ADMIN — Medication 4 MILLIGRAM(S): at 21:55

## 2021-01-30 NOTE — PROGRESS NOTE ADULT - SUBJECTIVE AND OBJECTIVE BOX
PROGRESS NOTE:     CONTACT INFO:  Giovanni Diaznatalie (AI - Internal Medicine T2)  [email or web address]  Pager: [phone]    Patient is a 35y old  Female who presents with a chief complaint of 35F p/w back pain and numbness over buttocks, groin, and left foot after fall (29 Jan 2021 07:30)      SUBJECTIVE / OVERNIGHT EVENTS:  No acute events overnight. Patient seen and evaluated at bedside. Ready for surgery today.  NPO since midnight. No fever/chills.  Denies SOB at rest, chest pain, palpitations, abdominal pain, nausea/vomiting. Neurological symptoms persist w/o significant change.     ADDITIONAL REVIEW OF SYSTEMS:    MEDICATIONS  (STANDING):  acetaminophen   Tablet .. 650 milliGRAM(s) Oral every 6 hours  calcium carbonate 1250 mG  + Vitamin D (OsCal 500 + D) 1 Tablet(s) Oral daily  carvedilol 12.5 milliGRAM(s) Oral every 12 hours  cyanocobalamin 1000 MICROGram(s) Oral daily  DULoxetine 30 milliGRAM(s) Oral daily  folic acid 1 milliGRAM(s) Oral daily  hydrochlorothiazide 50 milliGRAM(s) Oral daily  losartan 100 milliGRAM(s) Oral daily  methylPREDNISolone   Oral   methylPREDNISolone 4 milliGRAM(s) Oral before breakfast  methylPREDNISolone 4 milliGRAM(s) Oral after lunch  methylPREDNISolone 4 milliGRAM(s) Oral after dinner  methylPREDNISolone 8 milliGRAM(s) Oral at bedtime  multivitamin 1 Tablet(s) Oral daily  pantoprazole    Tablet 40 milliGRAM(s) Oral before breakfast    MEDICATIONS  (PRN):  oxyCODONE    IR 5 milliGRAM(s) Oral every 4 hours PRN Moderate Pain (4 - 6)  oxyCODONE    IR 10 milliGRAM(s) Oral every 4 hours PRN Severe Pain (7 - 10)      CAPILLARY BLOOD GLUCOSE      I&O's Summary    28 Jan 2021 07:01  -  29 Jan 2021 07:00  --------------------------------------------------------  IN: 380 mL / OUT: 0 mL / NET: 380 mL        PHYSICAL EXAM:  Vital Signs Last 24 Hrs  T(C): 36.5 (30 Jan 2021 05:06), Max: 36.8 (29 Jan 2021 20:42)  T(F): 97.7 (30 Jan 2021 05:06), Max: 98.3 (29 Jan 2021 20:42)  HR: 65 (30 Jan 2021 05:06) (65 - 78)  BP: 140/97 (30 Jan 2021 05:06) (116/76 - 140/97)  BP(mean): --  RR: 18 (30 Jan 2021 05:06) (18 - 18)  SpO2: 98% (30 Jan 2021 05:06) (97% - 98%)    GENERAL: NAD, well-groomed, well-developed  HEAD:  Atraumatic, Normocephalic  EYES: EOMI, PERRLA, conjunctiva and sclera clear  ENMT: Moist mucous membranes, Good dentition, No lesions  NECK: Supple, No JVD  CHEST/LUNG: Clear to percussion bilaterally; No rales, rhonchi, wheezing, or rubs  HEART: Regular rate and rhythm; No murmurs, rubs, or gallops  ABDOMEN: Soft, Nontender, Nondistended; Bowel sounds present  EXTREMITIES:  2+ Peripheral Pulses, No clubbing, cyanosis, or edema  SKIN: No rashes or lesions  NERVOUS SYSTEM:  Alert & Oriented X3, Good concentration; Motor Strength 5/5 B/L upper and 5/5 left lower extremity 5/5 right lower extremity; DTRs 2+ intact and symmetric. Achilles reflex symmetric and present bilaterally. Reported numbness over palpation of left hip and left foot and toes. Pt able to wiggle BL toes, but left leg abilities diminished vs right    LABS:                          15.7   11.69 )-----------( 281      ( 29 Jan 2021 07:03 )             44.5     01-30    136  |  99  |  24<H>  ----------------------------<  100<H>  4.1   |  28  |  0.61    Ca    8.8      30 Jan 2021 06:40  Phos  4.0     01-29  Mg     2.3     01-29    TPro  7.4  /  Alb  4.3  /  TBili  0.6  /  DBili  x   /  AST  9<L>  /  ALT  8<L>  /  AlkPhos  79  01-29    PT/INR - ( 29 Jan 2021 07:14 )   PT: 12.5 sec;   INR: 1.04 ratio         PTT - ( 29 Jan 2021 07:14 )  PTT:35.3 sec            RADIOLOGY & ADDITIONAL TESTS:  Results Reviewed:   Imaging Personally Reviewed:  Electrocardiogram Personally Reviewed:    COORDINATION OF CARE:  Care Discussed with Consultants/Other Providers [Y/N]:  Prior or Outpatient Records Reviewed [Y/N]:   PROGRESS NOTE:     CONTACT INFO:  Giovanni Diaznatalie (AI - Internal Medicine T2)  [email or web address]  Pager: [phone]    Patient is a 35y old  Female who presents with a chief complaint of 35F p/w back pain and numbness over buttocks, groin, and left foot after fall (29 Jan 2021 07:30)      SUBJECTIVE / OVERNIGHT EVENTS:  No acute events overnight. Patient seen and evaluated at bedside. Ready for surgery today.  NPO since midnight. No fever/chills.  Denies SOB at rest, chest pain, palpitations, abdominal pain, nausea/vomiting. Neurological symptoms persist w/o significant change.     ADDITIONAL REVIEW OF SYSTEMS:    MEDICATIONS  (STANDING):  acetaminophen   Tablet .. 650 milliGRAM(s) Oral every 6 hours  calcium carbonate 1250 mG  + Vitamin D (OsCal 500 + D) 1 Tablet(s) Oral daily  carvedilol 12.5 milliGRAM(s) Oral every 12 hours  cyanocobalamin 1000 MICROGram(s) Oral daily  DULoxetine 30 milliGRAM(s) Oral daily  folic acid 1 milliGRAM(s) Oral daily  hydrochlorothiazide 50 milliGRAM(s) Oral daily  losartan 100 milliGRAM(s) Oral daily  methylPREDNISolone   Oral   methylPREDNISolone 4 milliGRAM(s) Oral before breakfast  methylPREDNISolone 4 milliGRAM(s) Oral after lunch  methylPREDNISolone 4 milliGRAM(s) Oral after dinner  methylPREDNISolone 8 milliGRAM(s) Oral at bedtime  multivitamin 1 Tablet(s) Oral daily  pantoprazole    Tablet 40 milliGRAM(s) Oral before breakfast    MEDICATIONS  (PRN):  oxyCODONE    IR 5 milliGRAM(s) Oral every 4 hours PRN Moderate Pain (4 - 6)  oxyCODONE    IR 10 milliGRAM(s) Oral every 4 hours PRN Severe Pain (7 - 10)      CAPILLARY BLOOD GLUCOSE      I&O's Summary    28 Jan 2021 07:01  -  29 Jan 2021 07:00  --------------------------------------------------------  IN: 380 mL / OUT: 0 mL / NET: 380 mL        PHYSICAL EXAM:  Vital Signs Last 24 Hrs  T(C): 36.5 (30 Jan 2021 05:06), Max: 36.8 (29 Jan 2021 20:42)  T(F): 97.7 (30 Jan 2021 05:06), Max: 98.3 (29 Jan 2021 20:42)  HR: 65 (30 Jan 2021 05:06) (65 - 78)  BP: 140/97 (30 Jan 2021 05:06) (116/76 - 140/97)  BP(mean): --  RR: 18 (30 Jan 2021 05:06) (18 - 18)  SpO2: 98% (30 Jan 2021 05:06) (97% - 98%)    GENERAL: NAD, well-groomed, well-developed  HEAD:  Atraumatic, Normocephalic  EYES: EOMI, PERRLA, conjunctiva and sclera clear  ENMT: Moist mucous membranes, Good dentition, No lesions  NECK: Supple, No JVD  CHEST/LUNG: Clear to percussion bilaterally; No rales, rhonchi, wheezing, or rubs  HEART: Regular rate and rhythm; No murmurs, rubs, or gallops  ABDOMEN: Soft, Nontender, Nondistended; Bowel sounds present  EXTREMITIES:  2+ Peripheral Pulses, No clubbing, cyanosis, or edema  SKIN: No rashes or lesions  NERVOUS SYSTEM:  Alert & Oriented X3, Good concentration; Motor Strength 5/5 B/L upper and 5/5 left lower extremity 5/5 right lower extremity; DTRs 2+ intact and symmetric. Achilles reflex symmetric and present bilaterally. Reported numbness over palpation of left hip and left foot and toes. Pt able to wiggle BL toes, but left leg abilities diminished vs right    LABS:                          15.7   11.69 )-----------( 281      ( 29 Jan 2021 07:03 )             44.5     01-30    136  |  99  |  24<H>  ----------------------------<  100<H>  4.1   |  28  |  0.61    Ca    8.8      30 Jan 2021 06:40  Phos  4.0     01-29  Mg     2.3     01-29    TPro  7.4  /  Alb  4.3  /  TBili  0.6  /  DBili  x   /  AST  9<L>  /  ALT  8<L>  /  AlkPhos  79  01-29    PT/INR - ( 30 Jan 2021 06:42 )   PT: 13.2 sec;   INR: 1.11 ratio         PTT - ( 30 Jan 2021 06:42 )  PTT:36.4 sec      RADIOLOGY & ADDITIONAL TESTS:  Results Reviewed:   Imaging Personally Reviewed:  Electrocardiogram Personally Reviewed:    COORDINATION OF CARE:  Care Discussed with Consultants/Other Providers [Y/N]:  Prior or Outpatient Records Reviewed [Y/N]:   PROGRESS NOTE:     CONTACT INFO:  Giovanni Diaznatalie (AI - Internal Medicine T2)  [email or web address]  Pager: [phone]    Patient is a 35y old  Female who presents with a chief complaint of 35F p/w back pain and numbness over buttocks, groin, and left foot after fall (29 Jan 2021 07:30)      SUBJECTIVE / OVERNIGHT EVENTS:  No acute events overnight. Patient seen and evaluated at bedside. Ready for surgery today.  NPO since midnight. No fever/chills.  Denies SOB at rest, chest pain, palpitations, abdominal pain, nausea/vomiting. Neurological symptoms persist w/o significant change.     ADDITIONAL REVIEW OF SYSTEMS:    MEDICATIONS  (STANDING):  acetaminophen   Tablet .. 650 milliGRAM(s) Oral every 6 hours  calcium carbonate 1250 mG  + Vitamin D (OsCal 500 + D) 1 Tablet(s) Oral daily  carvedilol 12.5 milliGRAM(s) Oral every 12 hours  cyanocobalamin 1000 MICROGram(s) Oral daily  DULoxetine 30 milliGRAM(s) Oral daily  folic acid 1 milliGRAM(s) Oral daily  hydrochlorothiazide 50 milliGRAM(s) Oral daily  losartan 100 milliGRAM(s) Oral daily  methylPREDNISolone   Oral   methylPREDNISolone 4 milliGRAM(s) Oral before breakfast  methylPREDNISolone 4 milliGRAM(s) Oral after lunch  methylPREDNISolone 4 milliGRAM(s) Oral after dinner  methylPREDNISolone 8 milliGRAM(s) Oral at bedtime  multivitamin 1 Tablet(s) Oral daily  pantoprazole    Tablet 40 milliGRAM(s) Oral before breakfast    MEDICATIONS  (PRN):  oxyCODONE    IR 5 milliGRAM(s) Oral every 4 hours PRN Moderate Pain (4 - 6)  oxyCODONE    IR 10 milliGRAM(s) Oral every 4 hours PRN Severe Pain (7 - 10)      CAPILLARY BLOOD GLUCOSE      I&O's Summary    28 Jan 2021 07:01  -  29 Jan 2021 07:00  --------------------------------------------------------  IN: 380 mL / OUT: 0 mL / NET: 380 mL        PHYSICAL EXAM:  Vital Signs Last 24 Hrs  T(C): 36.5 (30 Jan 2021 05:06), Max: 36.8 (29 Jan 2021 20:42)  T(F): 97.7 (30 Jan 2021 05:06), Max: 98.3 (29 Jan 2021 20:42)  HR: 65 (30 Jan 2021 05:06) (65 - 78)  BP: 140/97 (30 Jan 2021 05:06) (116/76 - 140/97)  BP(mean): --  RR: 18 (30 Jan 2021 05:06) (18 - 18)  SpO2: 98% (30 Jan 2021 05:06) (97% - 98%)    GENERAL: NAD, well-groomed, well-developed  HEAD:  Atraumatic, Normocephalic  EYES: EOMI, PERRLA, conjunctiva and sclera clear  ENMT: Moist mucous membranes, Good dentition, No lesions  NECK: Supple, No JVD  CHEST/LUNG: Clear to percussion bilaterally; No rales, rhonchi, wheezing, or rubs  HEART: Regular rate and rhythm; No murmurs, rubs, or gallops  ABDOMEN: Soft, Nontender, Nondistended; Bowel sounds present  EXTREMITIES:  2+ Peripheral Pulses, No clubbing, cyanosis, or edema  SKIN: No rashes or lesions  NERVOUS SYSTEM:  Alert & Oriented X3, Good concentration; Motor Strength 5/5 B/L upper and 5/5 left lower extremity 5/5 right lower extremity; DTRs 2+ intact and symmetric. Achilles reflex symmetric and present bilaterally. Reported numbness over palpation of left hip and left foot and toes. Pt able to wiggle BL toes, but left leg abilities diminished vs right    LABS:                          15.4   12.38 )-----------( 277      ( 30 Jan 2021 06:42 )             43.6       01-30    136  |  99  |  24<H>  ----------------------------<  100<H>  4.1   |  28  |  0.61    Ca    8.8      30 Jan 2021 06:40  Phos  4.0     01-29  Mg     2.3     01-29    TPro  7.4  /  Alb  4.3  /  TBili  0.6  /  DBili  x   /  AST  9<L>  /  ALT  8<L>  /  AlkPhos  79  01-29    PT/INR - ( 30 Jan 2021 06:42 )   PT: 13.2 sec;   INR: 1.11 ratio         PTT - ( 30 Jan 2021 06:42 )  PTT:36.4 sec      RADIOLOGY & ADDITIONAL TESTS:  Results Reviewed:   Imaging Personally Reviewed:  Electrocardiogram Personally Reviewed:    COORDINATION OF CARE:  Care Discussed with Consultants/Other Providers [Y/N]:  Prior or Outpatient Records Reviewed [Y/N]:

## 2021-01-30 NOTE — PROGRESS NOTE ADULT - PROBLEM SELECTOR PLAN 5
Has not had bowel movement since admission. Refused bowel regimen. Will re-discuss with patient.  -had incontinence of stool this AM  -hold senna and miralax Has not had bowel movement since the incontinent episode 2 days prior  - restart senna and miralax post-op

## 2021-01-30 NOTE — PROGRESS NOTE ADULT - PROBLEM SELECTOR PLAN 8
DVT ppx: lovenox  istop#: [id number]   01/02/2021 01/07/2021 oxycodone hcl 5 mg tablet  6 2 [facility] #[id number]

## 2021-01-30 NOTE — PROGRESS NOTE ADULT - ASSESSMENT
35y Female w/ l5-S1 herniated disc going to the OR today with Dr Keane for discectomy, poss. laminectomy, and poss. posterior lumbar fusion    - Pain control  - Preop labs: CBC, BMP, coags, t&s x2  - npo since midnight  - IVF  - WBAT  - PT/OT/OOB  - I/S  - OR today

## 2021-01-30 NOTE — PROGRESS NOTE ADULT - ASSESSMENT
35F w/ class III obesity s/p gastric sleeve(2019; 100lb weight loss), chronic lower back pain with sciatica, HTN, chronic urinary incontinence presents with back pain and numbness over buttocks, groin, and left foot after fall admitted to medicine for further management. Physical exam is significant for decreased left leg strength 4/5 and decreased sensation of left hip, left foot and toes. MRI demonstrates compression of the cauda equina nerve roots by disc at L5-S1. Neurological deficits and pain improving. Transitioned from IV steroids to medrol dose pack today.     1/30 - Unchanged exam.  Pt made NPO after midnight and is scheduled for surgery today.

## 2021-01-30 NOTE — PROGRESS NOTE ADULT - PROBLEM SELECTOR PLAN 1
Seen by Ortho Spine. CT lumbar spine documents significant stenosis. MR spine demonstrates compression of cauda equina nerve roots at L5-S1.   -1/30 scheduled with ortho spine for spine surgery at 1:30pm  - 1/28 transitioned to medrol dose pack as per spine recs .   - ortho f/u requested for diminishing rectal tone, per ortho,   - will hold all stool softeners and monitor for worsening stool incontinence  -IV tylenol for pain, oxy 5mg for moderate, oxy 10mg for severe  -c/w duloxetine 30mg for pain (refused gabapentin and flexeril)   -PT/OT following

## 2021-01-30 NOTE — PROGRESS NOTE ADULT - SUBJECTIVE AND OBJECTIVE BOX
Orthopaedic Surgery Progress Note    Subjective:   Patient seen and examined. No acute events overnight. Pain well controlled. Still having symptoms, but IV steroids helped with some of her symptoms. Still in pain when seated.    Objective:  T(C): 36.8 (01-29-21 @ 20:42), Max: 36.8 (01-29-21 @ 20:42)  HR: 74 (01-29-21 @ 20:42) (74 - 90)  BP: 116/76 (01-29-21 @ 20:42) (116/76 - 130/91)  RR: 18 (01-29-21 @ 20:42) (16 - 18)  SpO2: 97% (01-29-21 @ 20:42) (96% - 98%)  Wt(kg): --    01-28 @ 07:01  -  01-29 @ 07:00  --------------------------------------------------------  IN: 380 mL / OUT: 0 mL / NET: 380 mL        PE  General: Awake and alert, Oriented x 3, following commands  Spine: No TTP over spinous processes or paraspinal muscles at C/T/L spine. No palpable step off.   Able to actively SLR BL  No pain to passive SLR    +numbness about the left buttock/left perineal area    Motor exam:        C5       C6       C7       C8       T1   R  5/5      5/5     5/5     5/5      5/5  L   5/5      5/5     5/5     5/5      5/5         L2        L3       L4       L5      S1  R  5/5      5/5     5/5     5/5    5/5  L   5/5     5/5      5/5     5/5    5/5    Sensory:  (0=absent, 1=impaired, 2=normal, NT=not testable)      C5    C6   C7   C8   T1         R   2     2      2     2      2  L    2     2      2     2      2        L2    L3   L4   L5   S1   R   2     2     2     2     2  L    2     2     2     0     2    BL Upper extremity:   Negative Madsen  +Rad Pulse  Compartments soft and compressible                          15.7   11.69 )-----------( 281      ( 29 Jan 2021 07:03 )             44.5     01-29    136  |  99  |  25<H>  ----------------------------<  93  3.7   |  25  |  0.67    Ca    9.2      29 Jan 2021 07:10  Phos  4.0     01-29  Mg     2.3     01-29    TPro  7.4  /  Alb  4.3  /  TBili  0.6  /  DBili  x   /  AST  9<L>  /  ALT  8<L>  /  AlkPhos  79  01-29    PT/INR - ( 29 Jan 2021 07:14 )   PT: 12.5 sec;   INR: 1.04 ratio         PTT - ( 29 Jan 2021 07:14 )  PTT:35.3 sec

## 2021-01-30 NOTE — CHART NOTE - NSCHARTNOTEFT_GEN_A_CORE
ORTHOPEDIC SURGERY POST-OP CHECK    S: Patient seen and examined at bedside POD0 s/p L5-S1 laminectomy/discectomy. Pain well controlled with current regimen. Denies numbness/tingling in the extremity. Denies fever, chills, shortness of breath, and chest pain.     O: T(C): 36.2 (01-30-21 @ 18:40), Max: 36.8 (01-29-21 @ 20:42)  HR: 76 (01-30-21 @ 20:00) (57 - 76)  BP: 131/77 (01-30-21 @ 20:00) (116/76 - 147/93)  RR: 18 (01-30-21 @ 20:00) (16 - 18)  SpO2: 95% (01-30-21 @ 20:00) (95% - 100%)    Exam:   Gen: NAD, resting in bed  Resp: unlabored breathing  Spine PE:  Dressing c/d/i  HV in place with SS output    Motor:                   C5                C6              C7               C8           T1   R            5/5                5/5            5/5             5/5          5/5  L             5/5               5/5             5/5             5/5          5/5                L2             L3             L4               L5            S1  R         5/5           5/5          5/5             5/5           5/5  L          5/5          5/5           5/5             5/5           5/5    Sensory:            C5         C6         C7      C8       T1        (0=absent, 1=impaired, 2=normal, NT=not testable)  R         2            2           2        2         2  L          2            2           2        2         2               L2          L3         L4      L5       S1         (0=absent, 1=impaired, 2=normal, NT=not testable)  R         2            2            2        2        2  L          2            2           2        2         2    01-30-21 @ 07:01  -  01-30-21 @ 20:26  --------------------------------------------------------  IN: 75 mL / OUT: 75 mL / NET: 0 mL          A/P: 35yFemale POD0 s/p L5-S1 lami/disc, recovering well  - Pain control  - WBAT  - DVT ppx per primary team (sandhya)   - PT/OT  - Encourage OOB/AAT  - Regular diet  - Monitor I&Os  - Monitor HV output  - Dispo planning

## 2021-01-30 NOTE — PROGRESS NOTE ADULT - PROBLEM SELECTOR PLAN 2
1/30 BP controlled. Will continue with current regimen.  - 1/29 inc carvedilol from 6.25 to 12.5mg BID  -c/w home losartan 100 mg   -increased HCTZ 1/26 to 50mg  -pain control to lower BP

## 2021-01-30 NOTE — PROGRESS NOTE ADULT - ATTENDING COMMENTS
Seen and examined at bedside.  1) cauda equina syndrome - planned for OR today  2) Hypertension - BP at goal  3) leukocytosis - trend for now; no clinical evidence of sepsis Seen and examined at bedside.  1) L5-S1 compression - planned for OR for discectomy, laminectomy and fusion today  2) Hypertension - BP at goal  3) leukocytosis - trend for now; no clinical evidence of sepsis

## 2021-01-31 LAB
ANION GAP SERPL CALC-SCNC: 7 MMOL/L — SIGNIFICANT CHANGE UP (ref 5–17)
BUN SERPL-MCNC: 20 MG/DL — SIGNIFICANT CHANGE UP (ref 7–23)
CALCIUM SERPL-MCNC: 8 MG/DL — LOW (ref 8.4–10.5)
CHLORIDE SERPL-SCNC: 102 MMOL/L — SIGNIFICANT CHANGE UP (ref 96–108)
CO2 SERPL-SCNC: 26 MMOL/L — SIGNIFICANT CHANGE UP (ref 22–31)
CREAT SERPL-MCNC: 0.69 MG/DL — SIGNIFICANT CHANGE UP (ref 0.5–1.3)
GLUCOSE SERPL-MCNC: 104 MG/DL — HIGH (ref 70–99)
HCT VFR BLD CALC: 39.3 % — SIGNIFICANT CHANGE UP (ref 34.5–45)
HGB BLD-MCNC: 13.4 G/DL — SIGNIFICANT CHANGE UP (ref 11.5–15.5)
MAGNESIUM SERPL-MCNC: 2.2 MG/DL — SIGNIFICANT CHANGE UP (ref 1.6–2.6)
MCHC RBC-ENTMCNC: 29.3 PG — SIGNIFICANT CHANGE UP (ref 27–34)
MCHC RBC-ENTMCNC: 34.1 GM/DL — SIGNIFICANT CHANGE UP (ref 32–36)
MCV RBC AUTO: 86 FL — SIGNIFICANT CHANGE UP (ref 80–100)
NRBC # BLD: 0 /100 WBCS — SIGNIFICANT CHANGE UP (ref 0–0)
PHOSPHATE SERPL-MCNC: 3.2 MG/DL — SIGNIFICANT CHANGE UP (ref 2.5–4.5)
PLATELET # BLD AUTO: 200 K/UL — SIGNIFICANT CHANGE UP (ref 150–400)
POTASSIUM SERPL-MCNC: 3.9 MMOL/L — SIGNIFICANT CHANGE UP (ref 3.5–5.3)
POTASSIUM SERPL-SCNC: 3.9 MMOL/L — SIGNIFICANT CHANGE UP (ref 3.5–5.3)
RBC # BLD: 4.57 M/UL — SIGNIFICANT CHANGE UP (ref 3.8–5.2)
RBC # FLD: 13.9 % — SIGNIFICANT CHANGE UP (ref 10.3–14.5)
SODIUM SERPL-SCNC: 135 MMOL/L — SIGNIFICANT CHANGE UP (ref 135–145)
WBC # BLD: 13 K/UL — HIGH (ref 3.8–10.5)
WBC # FLD AUTO: 13 K/UL — HIGH (ref 3.8–10.5)

## 2021-01-31 PROCEDURE — 99233 SBSQ HOSP IP/OBS HIGH 50: CPT | Mod: GC

## 2021-01-31 RX ADMIN — Medication 4 MILLIGRAM(S): at 13:21

## 2021-01-31 RX ADMIN — Medication 1 TABLET(S): at 06:24

## 2021-01-31 RX ADMIN — Medication 500 MILLIGRAM(S): at 16:45

## 2021-01-31 RX ADMIN — CYCLOBENZAPRINE HYDROCHLORIDE 10 MILLIGRAM(S): 10 TABLET, FILM COATED ORAL at 21:20

## 2021-01-31 RX ADMIN — OXYCODONE HYDROCHLORIDE 10 MILLIGRAM(S): 5 TABLET ORAL at 16:45

## 2021-01-31 RX ADMIN — Medication 500 MILLIGRAM(S): at 06:24

## 2021-01-31 RX ADMIN — PREGABALIN 1000 MICROGRAM(S): 225 CAPSULE ORAL at 10:06

## 2021-01-31 RX ADMIN — Medication 1 TABLET(S): at 13:21

## 2021-01-31 RX ADMIN — Medication 650 MILLIGRAM(S): at 16:46

## 2021-01-31 RX ADMIN — Medication 4 MILLIGRAM(S): at 21:20

## 2021-01-31 RX ADMIN — Medication 4 MILLIGRAM(S): at 06:24

## 2021-01-31 RX ADMIN — Medication 650 MILLIGRAM(S): at 06:25

## 2021-01-31 RX ADMIN — CYCLOBENZAPRINE HYDROCHLORIDE 10 MILLIGRAM(S): 10 TABLET, FILM COATED ORAL at 06:24

## 2021-01-31 RX ADMIN — OXYCODONE HYDROCHLORIDE 10 MILLIGRAM(S): 5 TABLET ORAL at 03:30

## 2021-01-31 RX ADMIN — DULOXETINE HYDROCHLORIDE 30 MILLIGRAM(S): 30 CAPSULE, DELAYED RELEASE ORAL at 10:06

## 2021-01-31 RX ADMIN — SENNA PLUS 2 TABLET(S): 8.6 TABLET ORAL at 21:20

## 2021-01-31 RX ADMIN — CYCLOBENZAPRINE HYDROCHLORIDE 10 MILLIGRAM(S): 10 TABLET, FILM COATED ORAL at 13:21

## 2021-01-31 RX ADMIN — Medication 1 TABLET(S): at 10:06

## 2021-01-31 RX ADMIN — Medication 650 MILLIGRAM(S): at 10:07

## 2021-01-31 RX ADMIN — CARVEDILOL PHOSPHATE 12.5 MILLIGRAM(S): 80 CAPSULE, EXTENDED RELEASE ORAL at 16:45

## 2021-01-31 RX ADMIN — Medication 200 MILLIGRAM(S): at 06:28

## 2021-01-31 RX ADMIN — HYDROMORPHONE HYDROCHLORIDE 0.5 MILLIGRAM(S): 2 INJECTION INTRAMUSCULAR; INTRAVENOUS; SUBCUTANEOUS at 13:21

## 2021-01-31 RX ADMIN — PANTOPRAZOLE SODIUM 40 MILLIGRAM(S): 20 TABLET, DELAYED RELEASE ORAL at 06:25

## 2021-01-31 RX ADMIN — HYDROMORPHONE HYDROCHLORIDE 0.5 MILLIGRAM(S): 2 INJECTION INTRAMUSCULAR; INTRAVENOUS; SUBCUTANEOUS at 21:15

## 2021-01-31 RX ADMIN — HYDROMORPHONE HYDROCHLORIDE 0.5 MILLIGRAM(S): 2 INJECTION INTRAMUSCULAR; INTRAVENOUS; SUBCUTANEOUS at 07:08

## 2021-01-31 RX ADMIN — Medication 1 MILLIGRAM(S): at 10:07

## 2021-01-31 RX ADMIN — Medication 1 TABLET(S): at 21:20

## 2021-01-31 RX ADMIN — HYDROMORPHONE HYDROCHLORIDE 0.5 MILLIGRAM(S): 2 INJECTION INTRAMUSCULAR; INTRAVENOUS; SUBCUTANEOUS at 00:14

## 2021-01-31 NOTE — PROGRESS NOTE ADULT - SUBJECTIVE AND OBJECTIVE BOX
warm blanket provided/wait time explained PROGRESS NOTE:     CONTACT INFO:  Giovanni Tejada ( - Internal Medicine T2)  Pager: [phone]    Patient is a 35y old  Female who presents with a chief complaint of 35F p/w back pain and numbness over buttocks, groin, and left foot after fall (29 Jan 2021 07:30)      SUBJECTIVE / OVERNIGHT EVENTS: Patient seen and evaluated at bedside.  Feels better after surgery yesterday. Says pain is improved and feels more soreness now then burning. Says saddle anaesthesia has improved. Tolerated diet last night. No episodes of fecal incontinence. Denies SOB at rest, chest pain, palpitations, abdominal pain, nausea/vomiting. 100cc blood loss during surgery.     MEDICATIONS  (STANDING):  acetaminophen   Tablet .. 650 milliGRAM(s) Oral every 6 hours  ascorbic acid 500 milliGRAM(s) Oral two times a day  calcium carbonate 1250 mG  + Vitamin D (OsCal 500 + D) 1 Tablet(s) Oral three times a day  carvedilol 12.5 milliGRAM(s) Oral every 12 hours  cyanocobalamin 1000 MICROGram(s) Oral daily  cyclobenzaprine 10 milliGRAM(s) Oral every 8 hours  DULoxetine 30 milliGRAM(s) Oral daily  folic acid 1 milliGRAM(s) Oral daily  hydrochlorothiazide 50 milliGRAM(s) Oral daily  lactated ringers. 1000 milliLiter(s) (75 mL/Hr) IV Continuous <Continuous>  losartan 100 milliGRAM(s) Oral daily  methylPREDNISolone   Oral   methylPREDNISolone 4 milliGRAM(s) Oral before breakfast  methylPREDNISolone 4 milliGRAM(s) Oral after lunch  methylPREDNISolone 4 milliGRAM(s) Oral at bedtime  multivitamin 1 Tablet(s) Oral daily  pantoprazole    Tablet 40 milliGRAM(s) Oral before breakfast  senna 2 Tablet(s) Oral at bedtime  sodium chloride 0.9%. 1000 milliLiter(s) (100 mL/Hr) IV Continuous <Continuous>    MEDICATIONS  (PRN):  acetaminophen   Tablet .. 650 milliGRAM(s) Oral every 6 hours PRN Temp greater or equal to 38C (100.4F), Mild Pain (1 - 3)  HYDROmorphone  Injectable 0.5 milliGRAM(s) SubCutaneous every 6 hours PRN breakthrough pain  magnesium hydroxide Suspension 30 milliLiter(s) Oral every 12 hours PRN Constipation  ondansetron Injectable 4 milliGRAM(s) IV Push every 6 hours PRN Nausea  oxyCODONE    IR 5 milliGRAM(s) Oral every 4 hours PRN Moderate Pain (4 - 6)  oxyCODONE    IR 10 milliGRAM(s) Oral every 4 hours PRN Severe Pain (7 - 10)            CAPILLARY BLOOD GLUCOSE      I&O's Summary    30 Jan 2021 07:01  -  31 Jan 2021 07:00  --------------------------------------------------------  IN: 75 mL / OUT: 845 mL / NET: -770 mL            PHYSICAL EXAM:  Vital Signs Last 24 Hrs  T(C): 37 (31 Jan 2021 08:22), Max: 37 (31 Jan 2021 08:22)  T(F): 98.6 (31 Jan 2021 08:22), Max: 98.6 (31 Jan 2021 08:22)  HR: 79 (31 Jan 2021 08:22) (57 - 79)  BP: 129/80 (31 Jan 2021 08:22) (108/68 - 147/93)  BP(mean): 98 (30 Jan 2021 20:00) (97 - 115)  RR: 20 (31 Jan 2021 08:22) (16 - 20)  SpO2: 98% (31 Jan 2021 08:22) (95% - 100%)    GENERAL: NAD, well-groomed, well-developed  HEAD:  Atraumatic, Normocephalic  EYES: EOMI, PERRLA, conjunctiva and sclera clear  ENMT: Moist mucous membranes, Good dentition, No lesions  NECK: Supple, No JVD  CHEST/LUNG: Clear to percussion bilaterally; No rales, rhonchi, wheezing, or rubs  HEART: Regular rate and rhythm; No murmurs, rubs, or gallops  ABDOMEN: Soft, Nontender, Nondistended; Bowel sounds present  EXTREMITIES:  2+ Peripheral Pulses, No clubbing, cyanosis, or edema  SKIN: No rashes or lesions  NERVOUS SYSTEM:  Alert & Oriented X3, Good concentration; Motor Strength 5/5 B/L upper and 5/5 left lower extremity 5/5 right lower extremity; DTRs 2+ intact and symmetric. Achilles reflex symmetric and present bilaterally. Improved sensation over left leg, groin and left foot. Numbness remains at the base of the sole of left foot. Hemovac in place draining serosanguinous fluid. Bandage in place.    LABS:                          13.4   13.00 )-----------( 200      ( 31 Jan 2021 06:56 )             39.3     01-31    135  |  102  |  20  ----------------------------<  104<H>  3.9   |  26  |  0.69    Ca    8.0<L>      31 Jan 2021 06:46  Phos  3.2     01-31  Mg     2.2     01-31    PT/INR - ( 30 Jan 2021 06:42 )   PT: 13.2 sec;   INR: 1.11 ratio         PTT - ( 30 Jan 2021 06:42 )  PTT:36.4 sec      RADIOLOGY & ADDITIONAL TESTS:  Results Reviewed:   Imaging Personally Reviewed:  Electrocardiogram Personally Reviewed:    COORDINATION OF CARE:  Care Discussed with Consultants/Other Providers [Y/N]:  Prior or Outpatient Records Reviewed [Y/N]:   PROGRESS NOTE:     CONTACT INFO:  Giovanni Tejada ( - Internal Medicine T2)  Pager: [phone]    Patient is a 35y old  Female who presents with a chief complaint of 35F p/w back pain and numbness over buttocks, groin, and left foot after fall (29 Jan 2021 07:30)      SUBJECTIVE / OVERNIGHT EVENTS: Patient seen and evaluated at bedside.  Feels better after surgery yesterday. Says pain is improved and feels more soreness now then burning. Says saddle anaesthesia has improved. Pt says she is passing gas. No bowel movement yet. Started on bowel regimen this AM. Tolerated diet last night. No episodes of fecal incontinence. Denies SOB at rest, chest pain, palpitations, abdominal pain, nausea/vomiting. 100cc blood loss during surgery.    MEDICATIONS  (STANDING):  acetaminophen   Tablet .. 650 milliGRAM(s) Oral every 6 hours  ascorbic acid 500 milliGRAM(s) Oral two times a day  calcium carbonate 1250 mG  + Vitamin D (OsCal 500 + D) 1 Tablet(s) Oral three times a day  carvedilol 12.5 milliGRAM(s) Oral every 12 hours  cyanocobalamin 1000 MICROGram(s) Oral daily  cyclobenzaprine 10 milliGRAM(s) Oral every 8 hours  DULoxetine 30 milliGRAM(s) Oral daily  folic acid 1 milliGRAM(s) Oral daily  hydrochlorothiazide 50 milliGRAM(s) Oral daily  lactated ringers. 1000 milliLiter(s) (75 mL/Hr) IV Continuous <Continuous>  losartan 100 milliGRAM(s) Oral daily  methylPREDNISolone   Oral   methylPREDNISolone 4 milliGRAM(s) Oral before breakfast  methylPREDNISolone 4 milliGRAM(s) Oral after lunch  methylPREDNISolone 4 milliGRAM(s) Oral at bedtime  multivitamin 1 Tablet(s) Oral daily  pantoprazole    Tablet 40 milliGRAM(s) Oral before breakfast  senna 2 Tablet(s) Oral at bedtime  sodium chloride 0.9%. 1000 milliLiter(s) (100 mL/Hr) IV Continuous <Continuous>    MEDICATIONS  (PRN):  acetaminophen   Tablet .. 650 milliGRAM(s) Oral every 6 hours PRN Temp greater or equal to 38C (100.4F), Mild Pain (1 - 3)  HYDROmorphone  Injectable 0.5 milliGRAM(s) SubCutaneous every 6 hours PRN breakthrough pain  magnesium hydroxide Suspension 30 milliLiter(s) Oral every 12 hours PRN Constipation  ondansetron Injectable 4 milliGRAM(s) IV Push every 6 hours PRN Nausea  oxyCODONE    IR 5 milliGRAM(s) Oral every 4 hours PRN Moderate Pain (4 - 6)  oxyCODONE    IR 10 milliGRAM(s) Oral every 4 hours PRN Severe Pain (7 - 10)            CAPILLARY BLOOD GLUCOSE      I&O's Summary    30 Jan 2021 07:01  -  31 Jan 2021 07:00  --------------------------------------------------------  IN: 75 mL / OUT: 845 mL / NET: -770 mL            PHYSICAL EXAM:  Vital Signs Last 24 Hrs  T(C): 37 (31 Jan 2021 08:22), Max: 37 (31 Jan 2021 08:22)  T(F): 98.6 (31 Jan 2021 08:22), Max: 98.6 (31 Jan 2021 08:22)  HR: 79 (31 Jan 2021 08:22) (57 - 79)  BP: 129/80 (31 Jan 2021 08:22) (108/68 - 147/93)  BP(mean): 98 (30 Jan 2021 20:00) (97 - 115)  RR: 20 (31 Jan 2021 08:22) (16 - 20)  SpO2: 98% (31 Jan 2021 08:22) (95% - 100%)    GENERAL: NAD, well-groomed, well-developed  HEAD:  Atraumatic, Normocephalic  EYES: EOMI, PERRLA, conjunctiva and sclera clear  ENMT: Moist mucous membranes, Good dentition, No lesions  NECK: Supple, No JVD  CHEST/LUNG: Clear to percussion bilaterally; No rales, rhonchi, wheezing, or rubs  HEART: Regular rate and rhythm; No murmurs, rubs, or gallops  ABDOMEN: Soft, Nontender, Nondistended; Bowel sounds present  EXTREMITIES:  2+ Peripheral Pulses, No clubbing, cyanosis, or edema  SKIN: No rashes or lesions  NERVOUS SYSTEM:  Alert & Oriented X3, Good concentration; Motor Strength 5/5 B/L upper and 5/5 left lower extremity 5/5 right lower extremity; DTRs 2+ intact and symmetric. Achilles reflex symmetric and present bilaterally. Improved sensation over left leg, groin and left foot. Numbness remains at the base of the sole of left foot. Hemovac in place draining serosanguinous fluid. Bandage in place.    LABS:                          13.4   13.00 )-----------( 200      ( 31 Jan 2021 06:56 )             39.3     01-31    135  |  102  |  20  ----------------------------<  104<H>  3.9   |  26  |  0.69    Ca    8.0<L>      31 Jan 2021 06:46  Phos  3.2     01-31  Mg     2.2     01-31    PT/INR - ( 30 Jan 2021 06:42 )   PT: 13.2 sec;   INR: 1.11 ratio         PTT - ( 30 Jan 2021 06:42 )  PTT:36.4 sec      RADIOLOGY & ADDITIONAL TESTS:  Results Reviewed:   Imaging Personally Reviewed:  Electrocardiogram Personally Reviewed:    COORDINATION OF CARE:  Care Discussed with Consultants/Other Providers [Y/N]:  Prior or Outpatient Records Reviewed [Y/N]:

## 2021-01-31 NOTE — PROGRESS NOTE ADULT - ASSESSMENT
35F w/ class III obesity s/p gastric sleeve(2019; 100lb weight loss), chronic lower back pain with sciatica, HTN, chronic urinary incontinence presents with back pain and numbness over buttocks, groin, and left foot after fall admitted to medicine for further management. Physical exam is significant for decreased left leg strength 4/5 and decreased sensation of left hip, left foot and toes. MRI demonstrates compression of the cauda equina nerve roots by disc at L5-S1. Neurological deficits and pain improving.     1/31 - Improved saddle anaesthesia post-op day 1. Pain much improved.

## 2021-01-31 NOTE — PROGRESS NOTE ADULT - PROBLEM SELECTOR PLAN 1
Seen by Ortho Spine. CT lumbar spine documents significant stenosis. MR spine demonstrates compression of cauda equina nerve roots at L5-S1.   - 1/31 post op day 1 - saddle anesthesia and pain improved. hemovac in place  - SCDs for DVT ppx  - PT/OT consult tomorrow  - OOB to chair today  -1/30 scheduled with ortho spine for spine surgery at 1:30pm  - 1/28 transitioned to medrol dose pack as per spine recs . Seen by Ortho Spine. CT lumbar spine documents significant stenosis. MR spine demonstrates compression of cauda equina nerve roots at L5-S1.   - 1/31 post op day 1 - saddle anesthesia and pain improved. hemovac in place  - SCDs for DVT ppx  - PT/OT consult tomorrow  - OOB to chair today  - 1/30 scheduled with ortho spine for spine surgery at 1:30pm  - 1/28 transitioned to medrol dose pack as per spine recs .

## 2021-01-31 NOTE — PROGRESS NOTE ADULT - SUBJECTIVE AND OBJECTIVE BOX
Orthopaedic Surgery Progress Note    Subjective:   Patient seen and examined  No acute events overnight  Pain well controlled, only complains of mild smita incisional pain  Says her numbness is much improved if not completely resolved    Objective:  T(C): 36.9 (01-31-21 @ 04:28), Max: 36.9 (01-31-21 @ 04:28)  HR: 68 (01-31-21 @ 04:28) (57 - 76)  BP: 108/68 (01-31-21 @ 04:28) (108/68 - 147/93)  RR: 18 (01-31-21 @ 04:28) (16 - 18)  SpO2: 96% (01-31-21 @ 04:28) (95% - 100%)  Wt(kg): --    01-30 @ 07:01  -  01-31 @ 05:28  --------------------------------------------------------  IN: 75 mL / OUT: 75 mL / NET: 0 mL        PE    NAD  Back:   dressing C/D/I, mild appropriate smita-incisional ttp  HMV in place w/ serosanguinous output  Neuro:  RLE IP 5/5 HS 5/5 Q 5/5 GS 5/5 TA 5/5 EHL 5/5   SILT L2-S1  LLE IP 5/5 HS 5/5 Q 5/5 GS 5/5 TA 5/5 EHL 5/5  SILT L2-S1  WWP BLE                          15.4   12.38 )-----------( 277      ( 30 Jan 2021 06:42 )             43.6     01-30    136  |  99  |  24<H>  ----------------------------<  100<H>  4.1   |  28  |  0.61    Ca    8.8      30 Jan 2021 06:40  Phos  4.0     01-29  Mg     2.3     01-29    TPro  7.4  /  Alb  4.3  /  TBili  0.6  /  DBili  x   /  AST  9<L>  /  ALT  8<L>  /  AlkPhos  79  01-29    PT/INR - ( 30 Jan 2021 06:42 )   PT: 13.2 sec;   INR: 1.11 ratio         PTT - ( 30 Jan 2021 06:42 )  PTT:36.4 sec      35y Female s/p L5-S1 lami/disc, doing well  - Pain control  - FU labs  - WBAT  - PT/OT/OOB  - I/S  - Monitor HMV output  - SCDs  - Dispo planning

## 2021-01-31 NOTE — PROGRESS NOTE ADULT - PROBLEM SELECTOR PLAN 8
DVT ppx: SCDs until ortho clears for anticoag  istop#: [id number]   01/02/2021 01/07/2021 oxycodone hcl 5 mg tablet  6 2 [id number]

## 2021-01-31 NOTE — PROGRESS NOTE ADULT - ATTENDING COMMENTS
1) L5-S1 compression - s/p discectomy, laminectomy 1/30; POD 1. Reports marked improvement in LE neurologic symptoms, endorses pain controlled on current regimen, ensure bowel regimen. Wound vac in place with serosanguinous output. Ortho following, appreciate recs. PT eval  2) Hypertension - BP at goal  3) leukocytosis - likely steroid induced and stable, trend for now; no clinical evidence of sepsis. 1) L5-S1 compression - s/p discectomy, laminectomy 1/30; POD 1. Reports marked improvement in LE neurologic symptoms, endorses pain controlled on current regimen, ensure bowel regimen. Wound vac in place with serosanguinous output. Ortho following, appreciate recs. PT eval  2) Hypertension - BP at goal  3) leukocytosis - likely steroid induced and stable, trend for now; no clinical evidence of sepsis.  4) Tobacco use - refused nicotine patch; education provided

## 2021-02-01 LAB
ANION GAP SERPL CALC-SCNC: 12 MMOL/L — SIGNIFICANT CHANGE UP (ref 5–17)
BASOPHILS # BLD AUTO: 0.03 K/UL — SIGNIFICANT CHANGE UP (ref 0–0.2)
BASOPHILS NFR BLD AUTO: 0.2 % — SIGNIFICANT CHANGE UP (ref 0–2)
BUN SERPL-MCNC: 13 MG/DL — SIGNIFICANT CHANGE UP (ref 7–23)
CALCIUM SERPL-MCNC: 8.7 MG/DL — SIGNIFICANT CHANGE UP (ref 8.4–10.5)
CHLORIDE SERPL-SCNC: 97 MMOL/L — SIGNIFICANT CHANGE UP (ref 96–108)
CO2 SERPL-SCNC: 24 MMOL/L — SIGNIFICANT CHANGE UP (ref 22–31)
CREAT SERPL-MCNC: 0.5 MG/DL — SIGNIFICANT CHANGE UP (ref 0.5–1.3)
EOSINOPHIL # BLD AUTO: 0.14 K/UL — SIGNIFICANT CHANGE UP (ref 0–0.5)
EOSINOPHIL NFR BLD AUTO: 0.9 % — SIGNIFICANT CHANGE UP (ref 0–6)
GLUCOSE SERPL-MCNC: 89 MG/DL — SIGNIFICANT CHANGE UP (ref 70–99)
HCT VFR BLD CALC: 36 % — SIGNIFICANT CHANGE UP (ref 34.5–45)
HGB BLD-MCNC: 12.6 G/DL — SIGNIFICANT CHANGE UP (ref 11.5–15.5)
IMM GRANULOCYTES NFR BLD AUTO: 0.4 % — SIGNIFICANT CHANGE UP (ref 0–1.5)
LYMPHOCYTES # BLD AUTO: 23.2 % — SIGNIFICANT CHANGE UP (ref 13–44)
LYMPHOCYTES # BLD AUTO: 3.44 K/UL — HIGH (ref 1–3.3)
MAGNESIUM SERPL-MCNC: 1.9 MG/DL — SIGNIFICANT CHANGE UP (ref 1.6–2.6)
MCHC RBC-ENTMCNC: 29.9 PG — SIGNIFICANT CHANGE UP (ref 27–34)
MCHC RBC-ENTMCNC: 35 GM/DL — SIGNIFICANT CHANGE UP (ref 32–36)
MCV RBC AUTO: 85.5 FL — SIGNIFICANT CHANGE UP (ref 80–100)
MONOCYTES # BLD AUTO: 1.18 K/UL — HIGH (ref 0–0.9)
MONOCYTES NFR BLD AUTO: 8 % — SIGNIFICANT CHANGE UP (ref 2–14)
NEUTROPHILS # BLD AUTO: 9.99 K/UL — HIGH (ref 1.8–7.4)
NEUTROPHILS NFR BLD AUTO: 67.3 % — SIGNIFICANT CHANGE UP (ref 43–77)
NRBC # BLD: 0 /100 WBCS — SIGNIFICANT CHANGE UP (ref 0–0)
PHOSPHATE SERPL-MCNC: 2.7 MG/DL — SIGNIFICANT CHANGE UP (ref 2.5–4.5)
PLATELET # BLD AUTO: 186 K/UL — SIGNIFICANT CHANGE UP (ref 150–400)
POTASSIUM SERPL-MCNC: 4.4 MMOL/L — SIGNIFICANT CHANGE UP (ref 3.5–5.3)
POTASSIUM SERPL-SCNC: 4.4 MMOL/L — SIGNIFICANT CHANGE UP (ref 3.5–5.3)
RBC # BLD: 4.21 M/UL — SIGNIFICANT CHANGE UP (ref 3.8–5.2)
RBC # FLD: 13.6 % — SIGNIFICANT CHANGE UP (ref 10.3–14.5)
SODIUM SERPL-SCNC: 133 MMOL/L — LOW (ref 135–145)
WBC # BLD: 14.84 K/UL — HIGH (ref 3.8–10.5)
WBC # FLD AUTO: 14.84 K/UL — HIGH (ref 3.8–10.5)

## 2021-02-01 PROCEDURE — 99233 SBSQ HOSP IP/OBS HIGH 50: CPT | Mod: GC

## 2021-02-01 RX ORDER — ENOXAPARIN SODIUM 100 MG/ML
40 INJECTION SUBCUTANEOUS DAILY
Refills: 0 | Status: DISCONTINUED | OUTPATIENT
Start: 2021-02-01 | End: 2021-02-02

## 2021-02-01 RX ORDER — POLYETHYLENE GLYCOL 3350 17 G/17G
17 POWDER, FOR SOLUTION ORAL
Refills: 0 | Status: DISCONTINUED | OUTPATIENT
Start: 2021-02-01 | End: 2021-02-03

## 2021-02-01 RX ADMIN — Medication 1 TABLET(S): at 17:24

## 2021-02-01 RX ADMIN — Medication 650 MILLIGRAM(S): at 17:31

## 2021-02-01 RX ADMIN — Medication 1 TABLET(S): at 21:59

## 2021-02-01 RX ADMIN — HYDROMORPHONE HYDROCHLORIDE 0.5 MILLIGRAM(S): 2 INJECTION INTRAMUSCULAR; INTRAVENOUS; SUBCUTANEOUS at 05:12

## 2021-02-01 RX ADMIN — POLYETHYLENE GLYCOL 3350 17 GRAM(S): 17 POWDER, FOR SOLUTION ORAL at 17:31

## 2021-02-01 RX ADMIN — PREGABALIN 1000 MICROGRAM(S): 225 CAPSULE ORAL at 11:21

## 2021-02-01 RX ADMIN — Medication 650 MILLIGRAM(S): at 06:03

## 2021-02-01 RX ADMIN — SENNA PLUS 2 TABLET(S): 8.6 TABLET ORAL at 21:59

## 2021-02-01 RX ADMIN — ENOXAPARIN SODIUM 40 MILLIGRAM(S): 100 INJECTION SUBCUTANEOUS at 22:05

## 2021-02-01 RX ADMIN — Medication 1 TABLET(S): at 06:03

## 2021-02-01 RX ADMIN — OXYCODONE HYDROCHLORIDE 10 MILLIGRAM(S): 5 TABLET ORAL at 14:49

## 2021-02-01 RX ADMIN — OXYCODONE HYDROCHLORIDE 10 MILLIGRAM(S): 5 TABLET ORAL at 08:47

## 2021-02-01 RX ADMIN — CARVEDILOL PHOSPHATE 12.5 MILLIGRAM(S): 80 CAPSULE, EXTENDED RELEASE ORAL at 06:02

## 2021-02-01 RX ADMIN — LOSARTAN POTASSIUM 100 MILLIGRAM(S): 100 TABLET, FILM COATED ORAL at 06:02

## 2021-02-01 RX ADMIN — Medication 1 MILLIGRAM(S): at 11:21

## 2021-02-01 RX ADMIN — Medication 500 MILLIGRAM(S): at 17:31

## 2021-02-01 RX ADMIN — Medication 50 MILLIGRAM(S): at 06:01

## 2021-02-01 RX ADMIN — PANTOPRAZOLE SODIUM 40 MILLIGRAM(S): 20 TABLET, DELAYED RELEASE ORAL at 06:01

## 2021-02-01 RX ADMIN — Medication 4 MILLIGRAM(S): at 06:03

## 2021-02-01 RX ADMIN — DULOXETINE HYDROCHLORIDE 30 MILLIGRAM(S): 30 CAPSULE, DELAYED RELEASE ORAL at 11:21

## 2021-02-01 RX ADMIN — OXYCODONE HYDROCHLORIDE 10 MILLIGRAM(S): 5 TABLET ORAL at 02:42

## 2021-02-01 RX ADMIN — Medication 650 MILLIGRAM(S): at 23:39

## 2021-02-01 RX ADMIN — HYDROMORPHONE HYDROCHLORIDE 0.5 MILLIGRAM(S): 2 INJECTION INTRAMUSCULAR; INTRAVENOUS; SUBCUTANEOUS at 21:52

## 2021-02-01 RX ADMIN — CYCLOBENZAPRINE HYDROCHLORIDE 10 MILLIGRAM(S): 10 TABLET, FILM COATED ORAL at 06:01

## 2021-02-01 RX ADMIN — Medication 4 MILLIGRAM(S): at 21:58

## 2021-02-01 RX ADMIN — Medication 650 MILLIGRAM(S): at 10:16

## 2021-02-01 RX ADMIN — Medication 1 TABLET(S): at 11:22

## 2021-02-01 RX ADMIN — Medication 500 MILLIGRAM(S): at 06:02

## 2021-02-01 RX ADMIN — Medication 650 MILLIGRAM(S): at 11:18

## 2021-02-01 RX ADMIN — CYCLOBENZAPRINE HYDROCHLORIDE 10 MILLIGRAM(S): 10 TABLET, FILM COATED ORAL at 21:58

## 2021-02-01 NOTE — DIETITIAN INITIAL EVALUATION ADULT. - PERTINENT MEDS FT
MEDICATIONS  (STANDING):  acetaminophen   Tablet .. 650 milliGRAM(s) Oral every 6 hours  ascorbic acid 500 milliGRAM(s) Oral two times a day  calcium carbonate 1250 mG  + Vitamin D (OsCal 500 + D) 1 Tablet(s) Oral three times a day  carvedilol 12.5 milliGRAM(s) Oral every 12 hours  cyanocobalamin 1000 MICROGram(s) Oral daily  cyclobenzaprine 10 milliGRAM(s) Oral every 8 hours  DULoxetine 30 milliGRAM(s) Oral daily  folic acid 1 milliGRAM(s) Oral daily  hydrochlorothiazide 50 milliGRAM(s) Oral daily  losartan 100 milliGRAM(s) Oral daily  methylPREDNISolone 4 milliGRAM(s) Oral before breakfast  methylPREDNISolone 4 milliGRAM(s) Oral at bedtime  methylPREDNISolone   Oral   multivitamin 1 Tablet(s) Oral daily  pantoprazole    Tablet 40 milliGRAM(s) Oral before breakfast  polyethylene glycol 3350 17 Gram(s) Oral two times a day  senna 2 Tablet(s) Oral at bedtime    MEDICATIONS  (PRN):  acetaminophen   Tablet .. 650 milliGRAM(s) Oral every 6 hours PRN Temp greater or equal to 38C (100.4F), Mild Pain (1 - 3)  HYDROmorphone  Injectable 0.5 milliGRAM(s) SubCutaneous every 6 hours PRN breakthrough pain  magnesium hydroxide Suspension 30 milliLiter(s) Oral every 12 hours PRN Constipation  ondansetron Injectable 4 milliGRAM(s) IV Push every 6 hours PRN Nausea  oxyCODONE    IR 5 milliGRAM(s) Oral every 4 hours PRN Moderate Pain (4 - 6)  oxyCODONE    IR 10 milliGRAM(s) Oral every 4 hours PRN Severe Pain (7 - 10)

## 2021-02-01 NOTE — DIETITIAN INITIAL EVALUATION ADULT. - ORAL INTAKE PTA/DIET HISTORY
Pt reports having a good appetite PTA; normally eating 5-6 small frequent meals throughout the day since bariatric sx in 2019. Per chart, pt noted to have lost 100lbs since then intentionally. Pt endorses peanut allergy. Pt endorses vitamin B12, D, C, folate, calcium, iron and Multivitamin.

## 2021-02-01 NOTE — PHYSICAL THERAPY INITIAL EVALUATION ADULT - BALANCE TRAINING, PT EVAL
GOAL: Pt will maintain good dynamic standing balance for 10mins to facilitate hygiene activities in 2 weeks.

## 2021-02-01 NOTE — OCCUPATIONAL THERAPY INITIAL EVALUATION ADULT - AMBULATORY DEVICES NEEDED
Disp Refills Start End    BD PEN NEEDLE KD U/F 32G X 4 MM Misc 120 each 0 1/5/2018 1/5/2019    Sig: Use to inject insulin 4 times daily. Please contact office to schedule appointment and come in for labs.    Class: Eprescribe    Notes to Pharmacy: Patient is due for appointment and labs for further refills    E-Prescribing Status: Receipt confirmed by pharmacy (1/5/2018  9:12 AM CST)      Spoke with preferred pharmacy regarding refill request, pharmacist reported the pen needles are dispensed in 100 count boxes and left message on patient's phone to return call.    
CONTINUE: CT Lumbar Spine (1/25): No acute displaced fracture or traumatic malalignment./no
CONTINUE: CT Lumbar Spine (1/25): No acute displaced fracture or traumatic malalignment./no

## 2021-02-01 NOTE — OCCUPATIONAL THERAPY INITIAL EVALUATION ADULT - LIVES WITH, PROFILE
Pt lives with family in apt with no stairs to enter +elevator access. Pt at baseline is ind for ADLs and functional mobility. +driving, +working., +shower/tub./children
Pt lives with family in apt with no stairs to enter +elevator access. Pt at baseline is ind for ADLs and functional mobility. +driving, +working., +shower/tub./children

## 2021-02-01 NOTE — PHYSICAL THERAPY INITIAL EVALUATION ADULT - GAIT DEVIATIONS NOTED, PT EVAL
decreased eleni/increased time in double stance/decreased velocity of limb motion/decreased stride length/decreased weight-shifting ability
decreased eleni/decreased step length/decreased weight-shifting ability

## 2021-02-01 NOTE — DIETITIAN INITIAL EVALUATION ADULT. - PROBLEM SELECTOR PLAN 8
Hold ac as patient may require surgery if not improving  istop#: [id number]   01/02/2021 01/07/2021 oxycodone hcl 5 mg tablet  6 2 [facility] #[id number]

## 2021-02-01 NOTE — OCCUPATIONAL THERAPY INITIAL EVALUATION ADULT - BALANCE TRAINING, PT EVAL
GOAL: Pt will demonstrate improved static/dynamic balance to fair+ in order to increase safety and independence in ADLs within 2 weeks;
GOAL: Pt will demonstrate improved static/dynamic balance to good in order to increase safety and independence in ADLs within 4 weeks

## 2021-02-01 NOTE — OCCUPATIONAL THERAPY INITIAL EVALUATION ADULT - ASR WT BEARING STATUS EVAL
Call from [facility] regarding Nifedipine 0 3%-lidocaine 5% rectal ointment   They never heard of it
no weight-bearing restrictions
no weight-bearing restrictions

## 2021-02-01 NOTE — OCCUPATIONAL THERAPY INITIAL EVALUATION ADULT - PHYSICAL ASSIST/NONPHYSICAL ASSIST: STAND/SIT, REHAB EVAL
verbal cues/nonverbal cues (demo/gestures)
verbal cues/nonverbal cues (demo/gestures)/2 person assist

## 2021-02-01 NOTE — OCCUPATIONAL THERAPY INITIAL EVALUATION ADULT - DIAGNOSIS, OT EVAL
Pt p/w LB and L LE pain demonstrating decrease in balance, strength and ROM affecting ADLs and functional mobility.
Pt p/w LB and L LE pain demonstrating decrease in balance, strength and ROM affecting ADLs and functional mobility.

## 2021-02-01 NOTE — OCCUPATIONAL THERAPY INITIAL EVALUATION ADULT - ADL RETRAINING, OT EVAL
GOAL: Pt will complete grooming task ind, standing at sink within 2 weeks
GOAL: Pt will complete LB dressing independently in 4 weeks

## 2021-02-01 NOTE — OCCUPATIONAL THERAPY INITIAL EVALUATION ADULT - IMPAIRED TRANSFERS: SIT/STAND, REHAB EVAL
impaired balance/impaired postural control/decreased strength
impaired balance/pain/decreased strength

## 2021-02-01 NOTE — PHYSICAL THERAPY INITIAL EVALUATION ADULT - PRECAUTIONS/LIMITATIONS, REHAB EVAL
CONTINUED: MRI lumbar spine reveals large central through left subarticular zone disc extrusion at L5-S1 occupying nearly the entire cross-sectional area the spinal cord and severely effacing the thecal sac and compressing the cauda equina nerve roots, additional multilovel spondylosis superimposed on congenital lumbar spinal canal narrowing as described, with disc protrusion at L3-L4 resulting in moderate to severe central spinal canal stenosis and disc bulge at L4-L5 resulting in moderate central spinal stenosis. Pt now s/p L5/S1 laminectomy for decompression (1/30)./spinal precautions

## 2021-02-01 NOTE — PHYSICAL THERAPY INITIAL EVALUATION ADULT - PERTINENT HX OF CURRENT PROBLEM, REHAB EVAL
34 yo F p/w L sided body pain s/p mechanical fall at home last night. Pt states L knee give out and fell onto L side. No LOC/ head trauma, not on anticoagulation. States she could not get up after falling so she slept on the floor all night. Her friend with a wheelchair picked her up today and brought her to ED. Denies pain down legs, Reports numbness about the left buttock/groin. Reports chronic history of urinary incontinence & LBP but denies bowel incontinence. XRay Hip/ Pelvis 1/25: Neg
Pt is a 34 y/o female admitted to [facility] on 1/26/21 w/ pmh obesity s/p gastric sleeve(2019; 100lb weight loss), chronic lower back pain with sciatica, HTN, chronic urinary incontinence presents with back pain and numbness over buttocks, groin, and left foot after fall. Pt reports that she had a mechanical fall in the hallway of her home the evening prior to presentation and landed on her left sided without headstrike. She reports instantaneously developing severe 10/10,
Pt is a 34 y/o female admitted with back pain and numbness over buttocks, groin, left foot s/p fall. PMH: class III obesity, s/p gastric sleeve (2019), chronic lower back pain without sciatica, HTN, chronic urinary incontinence.

## 2021-02-01 NOTE — PROGRESS NOTE ADULT - SUBJECTIVE AND OBJECTIVE BOX
Orthopaedic Surgery Progress Note    Subjective:   Patient seen and examined today. No acute events overnight. Pain is well controlled. Denies f/c, chest pain, sob, dizziness.    Objective:  T(C): 37.4 (01-31-21 @ 23:36), Max: 37.4 (01-31-21 @ 23:36)  HR: 81 (01-31-21 @ 23:36) (75 - 81)  BP: 122/76 (01-31-21 @ 23:36) (118/75 - 137/87)  RR: 18 (01-31-21 @ 23:36) (18 - 20)  SpO2: 97% (01-31-21 @ 23:36) (97% - 99%)  Wt(kg): --    01-30 @ 07:01  -  01-31 @ 07:00  --------------------------------------------------------  IN: 75 mL / OUT: 845 mL / NET: -770 mL    01-31 @ 07:01  -  02-01 @ 04:45  --------------------------------------------------------  IN: 1530 mL / OUT: 360 mL / NET: 1170 mL        PE    NAD  Back:   dressing C/D/I, mild appropriate smita-incisional ttp  HMV in place w/ serosanguinous output  Neuro:  RLE IP 5/5 HS 5/5 Q 5/5 GS 5/5 TA 5/5 EHL 5/5   SILT L2-S1  LLE IP 5/5 HS 5/5 Q 5/5 GS 5/5 TA 5/5 EHL 5/5  SILT L2-S1  WWP BLE                          13.4   13.00 )-----------( 200      ( 31 Jan 2021 06:56 )             39.3     01-31    135  |  102  |  20  ----------------------------<  104<H>  3.9   |  26  |  0.69    Ca    8.0<L>      31 Jan 2021 06:46  Phos  3.2     01-31  Mg     2.2     01-31      PT/INR - ( 30 Jan 2021 06:42 )   PT: 13.2 sec;   INR: 1.11 ratio         PTT - ( 30 Jan 2021 06:42 )  PTT:36.4 sec

## 2021-02-01 NOTE — OCCUPATIONAL THERAPY INITIAL EVALUATION ADULT - ADDITIONAL COMMENTS
MR Lumbar (1/25): Large central through left subarticular zone disc extrusion at L5-S1 occupying nearly the entire cross-sectional area the spinal cord and severely effacing the thecal sac and compressing the cauda equina nerve roots.  Additional multilevel spondylosis superimposed on congenital lumbar spinal canal narrowing, as described, with disc protrusion at L3-L4 resulting in moderate to severe central spinal canal stenosis and disc bulge at L4-L5 resulting in moderate central spinal canal stenosis.
XRAy Lumbar (1/30): Three intraoperative images of the lumbar spine demonstrates surgical hardware posterior to the L5-S1 level.   MR Lumbar (1/25): Large central through left subarticular zone disc extrusion at L5-S1 occupying nearly the entire cross-sectional area the spinal cord and severely effacing the thecal sac and compressing the cauda equina nerve roots.  Additional multilevel spondylosis superimposed on congenital lumbar spinal canal narrowing, as described, with disc protrusion at L3-L4 resulting in moderate to severe central spinal canal stenosis and disc bulge at L4-L5 resulting in moderate central spinal canal stenosis.

## 2021-02-01 NOTE — PROGRESS NOTE ADULT - ATTENDING COMMENTS
-Patient seen/examined on 2/1/21. Case/plan discussed with the house staff as reviewed by me and in any comments below.  -Ortho recs appreciated. -PT/OT. -Wound vac per ortho. -DVT PPx. -Bowel regimen. -Will needs to transition to oral pain meds only soon.

## 2021-02-01 NOTE — DIETITIAN INITIAL EVALUATION ADULT. - ADD RECOMMEND
Monitor PO intake, diet tolerance, weight trends, labs, GI function, and skin integrity. BMI alert placed; provider made aware.

## 2021-02-01 NOTE — OCCUPATIONAL THERAPY INITIAL EVALUATION ADULT - TRANSFER SAFETY CONCERNS NOTED: SIT/STAND, REHAB EVAL
decreased balance during turns/decreased weight-shifting ability
decreased balance during turns/decreased weight-shifting ability

## 2021-02-01 NOTE — OCCUPATIONAL THERAPY INITIAL EVALUATION ADULT - PLANNED THERAPY INTERVENTIONS, OT EVAL
ADL retraining/balance training/bed mobility training/transfer training
ADL retraining/balance training/strengthening/transfer training

## 2021-02-01 NOTE — DIETITIAN INITIAL EVALUATION ADULT. - PERTINENT LABORATORY DATA
02-01 Na 133 mmol/L<L> Glu 89 mg/dL K+ 4.4 mmol/L Cr  0.50 mg/dL BUN 13 mg/dL Phos 2.7 mg/dL Alb n/a   PAB n/a   Hgb n/a   Hct n/a

## 2021-02-01 NOTE — PHYSICAL THERAPY INITIAL EVALUATION ADULT - IMPAIRED TRANSFERS: SIT/STAND, REHAB EVAL
impaired balance/impaired postural control/decreased strength
impaired balance/impaired postural control/decreased ROM/decreased strength

## 2021-02-01 NOTE — PROGRESS NOTE ADULT - PROBLEM SELECTOR PLAN 1
Seen by Ortho Spine. CT lumbar spine documents significant stenosis. MR spine demonstrates compression of cauda equina nerve roots at L5-S1. s/p L5/S1 laminectomy and   - 1/31 post op day 2 - saddle anesthesia and pain improved. hemovac in place draining 13 cc/24 hrs  - ortho recs appreciated   - SCDs for DVT ppx  - PT/OT re-consult placed   - OOB to chair today  - monitor wound vac output Seen by Ortho Spine. CT lumbar spine documents significant stenosis. MR spine demonstrates compression of cauda equina nerve roots at L5-S1. s/p L5/S1 laminectomy and   - 1/31 post op day 2 - saddle anesthesia and pain improved. hemovac in place draining 13 cc/24 hrs  - ortho recs appreciated   - pain control oxy 5 q4 moderate pain, oxy 10 q4 severe pain, dilaudid 0.5 mg q6 for breakthrough   - SCDs for DVT ppx  - PT/OT re-consult placed   - OOB to chair today  - monitor wound vac output

## 2021-02-01 NOTE — PROGRESS NOTE ADULT - ASSESSMENT
35F w/ class III obesity s/p gastric sleeve(2019; 100lb weight loss), chronic lower back pain with sciatica, HTN, chronic urinary incontinence presents with back pain and numbness over buttocks, groin, and left foot after fall admitted to medicine for further management. Physical exam is significant for decreased left leg strength 4/5 and decreased sensation of left hip, left foot and toes. MRI demonstrates compression of the cauda equina nerve roots by disc at L5-S1 s/p L5/S1 laminectomy / disectomy 1/30. Neurological deficits and pain improving.

## 2021-02-01 NOTE — OCCUPATIONAL THERAPY INITIAL EVALUATION ADULT - TRANSFER TRAINING, PT EVAL
GOAL: Pt will perform sit to stand, bed <-> chair, toilet transfers independently in 4 weeks
GOAL: Pt will perform sit to stand, bed <-> chair, toilet transfers ind with AD prn  in 2 weeks

## 2021-02-01 NOTE — OCCUPATIONAL THERAPY INITIAL EVALUATION ADULT - PRECAUTIONS/LIMITATIONS, REHAB EVAL
She reports instantaneously developing severe 10/10, burning pain over lower back over tailbone, associated with numbness off her left buttock and groin, numbness of left 4th-6th toe, worsened when seated upright or when walking, improved when lying on the side, not improved with rest. She presented to the hospital because it did not improve over the day, the pain was in a new location and more severe than previous, and the sensation of numbness was a new symptom. She denies paralysis of the limbs. She does have chronic urinary incontinence but did not have bowel incontinence. She denies fever, chills, CP, palpitations, sob, cough, n/v/d, or painful urination./fall precautions/spinal precautions

## 2021-02-01 NOTE — OCCUPATIONAL THERAPY INITIAL EVALUATION ADULT - ANTICIPATED DISCHARGE DISPOSITION, OT EVAL
Acute Rehab/rehabilitation facility
Home no Occupational Therapy needs. Pending hospital course/no needs

## 2021-02-01 NOTE — DIETITIAN INITIAL EVALUATION ADULT. - OTHER INFO
Pt reports having a good appetite in-house but does not like institutional meals. Pt denies any chewing/swallowing difficulty, self-feeding difficulty, nausea/vomiting, constipation, or diarrhea. Last BM 2/1 per pt report.     Education: Encouraged pt to order nutrient dense snacks with meals to consume in between to mimic smaller, more frequent meals in house Pt amenable.

## 2021-02-01 NOTE — OCCUPATIONAL THERAPY INITIAL EVALUATION ADULT - PERTINENT HX OF CURRENT PROBLEM, REHAB EVAL
35F w/ class III obesity s/p gastric sleeve(2019; 100lb weight loss), chronic lower back pain with sciatica, HTN, chronic urinary incontinence presents with back pain and numbness over buttocks, groin, and left foot after fall. The patient reports that she had a mechanical fall in the hallway of her home the evening prior to presentation and landed on her left sided without headstrike.
35F w/ class III obesity s/p gastric sleeve(2019; 100lb weight loss), chronic lower back pain with sciatica, HTN, chronic urinary incontinence presents with back pain and numbness over buttocks, groin, and left foot after fall. The patient reports that she had a mechanical fall in the hallway of her home the evening prior to presentation and landed on her left sided without headstrike.

## 2021-02-01 NOTE — PROGRESS NOTE ADULT - ASSESSMENT
35y Female s/p L5-S1 lami/disc, POD#2  - Pain control  - WBAT  - PT/OT/OOB  - I/S  - Monitor HMV output  - SCDs  - Dispo planning

## 2021-02-01 NOTE — PROGRESS NOTE ADULT - PROBLEM SELECTOR PLAN 8
Transitions of Care Status: follow up with ortho outpatient clinic  1.  Name of PCP:  2.  PCP Contacted on Admission: [ ] Y    [ ] N    3.  PCP contacted at Discharge: [ ] Y    [ ] N    [ ] N/A  4.  Post-Discharge Appointment Date and Location:  5.  Summary of Handoff given to PCP:

## 2021-02-01 NOTE — PHYSICAL THERAPY INITIAL EVALUATION ADULT - GAIT TRAINING, PT EVAL
GOAL: Pt will ambulate 150ft with rolling walker independently in 4 weeks.
GOAL: Pt will amb 150' independently with RW in 2 weeks.

## 2021-02-01 NOTE — DIETITIAN INITIAL EVALUATION ADULT. - CALCULATED FROM (ML/KG)
Daily Note     Today's date: 2020  Patient name: Chan Cerna  : 2019  MRN: [id number]  Referring provider: Miky Keith MD  Dx:   Encounter Diagnosis     ICD-10-CM    1  Torticollis M43 6    2  Plagiocephaly Q67 3        Start Time: 5211  Stop Time: 4670  Total time in clinic (min): 35 minutes     Insurance:  [address] [place] [address]   used 2020    Subjective: Grandmother reports compliance with HEP  He is difficult to stretch at times still  Spoke with mother on phone during session to educate her more on torticollis, plagiocephaly, and helmet  Objective:   Manual:  Zack football carry  Shoulder depression  Zack trunk stretch  Passive C/S lat flex R and rotation L in supine    Therapeutic activity:  Prone play with active looking to R and L-limited 25% on L side  Rolling supine to prone over L side and is now able to roll to supine independently  Supported sitting and side sitting while activating toys  Therapeutic exercise:  Therapy ball activities including:  Prone  Supported sitting and side sitting  S/L on L side with difficulty elevating neck lat past 0 degrees    Fwd carry for strengthening of cervical and trunk extensors  Supported side carry with active head righting       Assessment: Tolerated treatment well  Patient would benefit from continued PT  Pt hungry at end of session-ended early as pt would not participate  Pt demonstrating fair tolerance to PROM C/S lat flex R  Pt presenting with plagiocephaly  Plan: Continue per plan of care  Continue with HEP  Provided orthotist information again  [id number]

## 2021-02-01 NOTE — PROGRESS NOTE ADULT - PROBLEM SELECTOR PLAN 7
DVT ppx: SCDs until ortho clears for anticoag  istop#: [id number]   01/02/2021 01/07/2021 oxycodone hcl 5 mg tablet  6 2 [facility] #[id number]

## 2021-02-01 NOTE — PHYSICAL THERAPY INITIAL EVALUATION ADULT - IMPAIRMENTS CONTRIBUTING TO GAIT DEVIATIONS, PT EVAL
impaired balance/decreased flexibility/impaired postural control/decreased strength
impaired balance/impaired postural control/decreased strength

## 2021-02-01 NOTE — OCCUPATIONAL THERAPY INITIAL EVALUATION ADULT - PHYSICAL ASSIST/NONPHYSICAL ASSIST: SIT/STAND, REHAB EVAL
verbal cues/nonverbal cues (demo/gestures)/2 person assist
verbal cues/nonverbal cues (demo/gestures)

## 2021-02-01 NOTE — PROGRESS NOTE ADULT - SUBJECTIVE AND OBJECTIVE BOX
***************************************************************  Dr. Chey Saenz  Internal Medicine   [facility] Pager: [phone]  [facility] Pager: [phone]   ***************************************************************    VINNIE GARCIA  35y  MRN: [id number]    Subjective:    Patient is a 35y old  Female who presents with a chief complaint of 35F p/w back pain and numbness over buttocks, groin, and left foot after fall (01 Feb 2021 04:45)    Interval history/overnight events:    MUSHTAQ  ON. No BM x 5 days, however +flatus, no abd pain, n/v. Per patient, not eating well due to DASH/Diet and she normally does not have a BM when her intake is low. Will liberalize diet as BP better controlled. Saddle anesthesia resolved. Mod pain at wound vac site.      MEDICATIONS  (STANDING):  acetaminophen   Tablet .. 650 milliGRAM(s) Oral every 6 hours  ascorbic acid 500 milliGRAM(s) Oral two times a day  calcium carbonate 1250 mG  + Vitamin D (OsCal 500 + D) 1 Tablet(s) Oral three times a day  carvedilol 12.5 milliGRAM(s) Oral every 12 hours  cyanocobalamin 1000 MICROGram(s) Oral daily  cyclobenzaprine 10 milliGRAM(s) Oral every 8 hours  DULoxetine 30 milliGRAM(s) Oral daily  folic acid 1 milliGRAM(s) Oral daily  hydrochlorothiazide 50 milliGRAM(s) Oral daily  losartan 100 milliGRAM(s) Oral daily  methylPREDNISolone 4 milliGRAM(s) Oral at bedtime  methylPREDNISolone   Oral   methylPREDNISolone 4 milliGRAM(s) Oral before breakfast  multivitamin 1 Tablet(s) Oral daily  pantoprazole    Tablet 40 milliGRAM(s) Oral before breakfast  senna 2 Tablet(s) Oral at bedtime    MEDICATIONS  (PRN):  acetaminophen   Tablet .. 650 milliGRAM(s) Oral every 6 hours PRN Temp greater or equal to 38C (100.4F), Mild Pain (1 - 3)  HYDROmorphone  Injectable 0.5 milliGRAM(s) SubCutaneous every 6 hours PRN breakthrough pain  magnesium hydroxide Suspension 30 milliLiter(s) Oral every 12 hours PRN Constipation  ondansetron Injectable 4 milliGRAM(s) IV Push every 6 hours PRN Nausea  oxyCODONE    IR 5 milliGRAM(s) Oral every 4 hours PRN Moderate Pain (4 - 6)  oxyCODONE    IR 10 milliGRAM(s) Oral every 4 hours PRN Severe Pain (7 - 10)        Objective:    Vitals: Vital Signs Last 24 Hrs  T(C): 37.4 (02-01-21 @ 04:46), Max: 37.4 (01-31-21 @ 23:36)  T(F): 99.3 (02-01-21 @ 04:46), Max: 99.3 (01-31-21 @ 23:36)  HR: 95 (02-01-21 @ 04:46) (75 - 95)  BP: 124/84 (02-01-21 @ 04:46) (118/75 - 137/87)  BP(mean): --  RR: 18 (02-01-21 @ 04:46) (18 - 20)  SpO2: 97% (02-01-21 @ 04:46) (97% - 99%)            I&O's Summary    31 Jan 2021 07:01  -  01 Feb 2021 07:00  --------------------------------------------------------  IN: 1530 mL / OUT: 363 mL / NET: 1167 mL      PHYSICAL EXAM:  GENERAL: obese female in NAD   HEENT: PERRL, no scleral icterus, no head and neck lad   CHEST/LUNG: CTAB, no wheezing, crackles, or ronchi   HEART: RRR, normal S1, S2, no murmurs, gallops, or rubs appreciated   ABDOMEN: soft, nondistended, non-tender, normoactive, no HSM, no rebound, no guarding, no rigidity  SKIN: No rashes or lesions  NERVOUS SYSTEM: Alert & Oriented X3  PSYCH: calm and cooperative     LABS:    02-01    133<L>  |  97  |  13  ----------------------------<  89  4.4   |  24  |  0.50  01-31    135  |  102  |  20  ----------------------------<  104<H>  3.9   |  26  |  0.69  01-30    136  |  99  |  24<H>  ----------------------------<  100<H>  4.1   |  28  |  0.61    Ca    8.7      01 Feb 2021 06:26  Ca    8.0<L>      31 Jan 2021 06:46  Ca    8.8      30 Jan 2021 06:40  Phos  2.7     02-01  Mg     1.9     02-01                                              12.6   14.84 )-----------( 186      ( 01 Feb 2021 06:25 )             36.0                         13.4   13.00 )-----------( 200      ( 31 Jan 2021 06:56 )             39.3                         15.4   12.38 )-----------( 277      ( 30 Jan 2021 06:42 )             43.6     CAPILLARY BLOOD GLUCOSE              RADIOLOGY & ADDITIONAL TESTS:            Imaging Personally Reviewed:  [ ] YES  [ ] NO    Consultants involved in case:   Consultant(s) Notes Reviewed:  [ ] YES  [ ] NO:   Care Discussed with Consultants/Other Providers [ ] YES  [ ] NO         ***************************************************************  Dr. Chey Seanz  Internal Medicine   [facility] Pager: [phone]  [facility] Pager: [phone]   ***************************************************************    VINNIE GARCIA  35y  MRN: [id number]    Subjective:    Patient is a 35y old  Female who presents with a chief complaint of 35F p/w back pain and numbness over buttocks, groin, and left foot after fall (01 Feb 2021 04:45)    Interval history/overnight events:    MUSHTAQ  ON. No BM x 5 days, however +flatus, no abd pain, n/v. Per patient, not eating well due to DASH/Diet and she normally does not have a BM when her intake is low. Will liberalize diet as BP better controlled. Saddle anesthesia resolved. Mod pain at wound vac site.      MEDICATIONS  (STANDING):  acetaminophen   Tablet .. 650 milliGRAM(s) Oral every 6 hours  ascorbic acid 500 milliGRAM(s) Oral two times a day  calcium carbonate 1250 mG  + Vitamin D (OsCal 500 + D) 1 Tablet(s) Oral three times a day  carvedilol 12.5 milliGRAM(s) Oral every 12 hours  cyanocobalamin 1000 MICROGram(s) Oral daily  cyclobenzaprine 10 milliGRAM(s) Oral every 8 hours  DULoxetine 30 milliGRAM(s) Oral daily  folic acid 1 milliGRAM(s) Oral daily  hydrochlorothiazide 50 milliGRAM(s) Oral daily  losartan 100 milliGRAM(s) Oral daily  methylPREDNISolone 4 milliGRAM(s) Oral at bedtime  methylPREDNISolone   Oral   methylPREDNISolone 4 milliGRAM(s) Oral before breakfast  multivitamin 1 Tablet(s) Oral daily  pantoprazole    Tablet 40 milliGRAM(s) Oral before breakfast  senna 2 Tablet(s) Oral at bedtime    MEDICATIONS  (PRN):  acetaminophen   Tablet .. 650 milliGRAM(s) Oral every 6 hours PRN Temp greater or equal to 38C (100.4F), Mild Pain (1 - 3)  HYDROmorphone  Injectable 0.5 milliGRAM(s) SubCutaneous every 6 hours PRN breakthrough pain  magnesium hydroxide Suspension 30 milliLiter(s) Oral every 12 hours PRN Constipation  ondansetron Injectable 4 milliGRAM(s) IV Push every 6 hours PRN Nausea  oxyCODONE    IR 5 milliGRAM(s) Oral every 4 hours PRN Moderate Pain (4 - 6)  oxyCODONE    IR 10 milliGRAM(s) Oral every 4 hours PRN Severe Pain (7 - 10)      Objective:    Vitals: Vital Signs Last 24 Hrs  T(C): 37.4 (02-01-21 @ 04:46), Max: 37.4 (01-31-21 @ 23:36)  T(F): 99.3 (02-01-21 @ 04:46), Max: 99.3 (01-31-21 @ 23:36)  HR: 95 (02-01-21 @ 04:46) (75 - 95)  BP: 124/84 (02-01-21 @ 04:46) (118/75 - 137/87)  BP(mean): --  RR: 18 (02-01-21 @ 04:46) (18 - 20)  SpO2: 97% (02-01-21 @ 04:46) (97% - 99%)            I&O's Summary    31 Jan 2021 07:01  -  01 Feb 2021 07:00  --------------------------------------------------------  IN: 1530 mL / OUT: 363 mL / NET: 1167 mL      PHYSICAL EXAM:  GENERAL: obese female in NAD   HEENT: PERRL, no scleral icterus, no head and neck lad   CHEST/LUNG: CTAB, no wheezing, crackles, or ronchi   HEART: RRR, normal S1, S2, no murmurs, gallops, or rubs appreciated   ABDOMEN: soft, nondistended, non-tender, normoactive, no HSM, no rebound, no guarding, no rigidity  : patient deferred exam   SKIN: No rashes or lesions  MSK: wound vac in place over lumbosacral spine w/ scant sanguineous output   NERVOUS SYSTEM: Alert & Oriented X3, strength 5/5 BL LE, gross and fine touch intact bl LE   PSYCH: calm and cooperative     LABS:    02-01    133<L>  |  97  |  13  ----------------------------<  89  4.4   |  24  |  0.50  01-31    135  |  102  |  20  ----------------------------<  104<H>  3.9   |  26  |  0.69  01-30    136  |  99  |  24<H>  ----------------------------<  100<H>  4.1   |  28  |  0.61    Ca    8.7      01 Feb 2021 06:26  Ca    8.0<L>      31 Jan 2021 06:46  Ca    8.8      30 Jan 2021 06:40  Phos  2.7     02-01  Mg     1.9     02-01                                              12.6   14.84 )-----------( 186      ( 01 Feb 2021 06:25 )             36.0                         13.4   13.00 )-----------( 200      ( 31 Jan 2021 06:56 )             39.3                         15.4   12.38 )-----------( 277      ( 30 Jan 2021 06:42 )             43.6     CAPILLARY BLOOD GLUCOSE              RADIOLOGY & ADDITIONAL TESTS:            Imaging Personally Reviewed:  [ ] YES  [ ] NO    Consultants involved in case:   Consultant(s) Notes Reviewed:  [ ] YES  [ ] NO:   Care Discussed with Consultants/Other Providers [ ] YES  [ ] NO         ***************************************************************  Dr. Chey Saenz  Internal Medicine   [facility] Pager: [phone]  [facility] Pager: [phone]   ***************************************************************    VINNIE GARCIA  35y  MRN: [id number]    Subjective:    Patient is a 35y old  Female who presents with a chief complaint of 35F p/w back pain and numbness over buttocks, groin, and left foot after fall (01 Feb 2021 04:45)    Interval history/overnight events:    MUSHTAQ  ON. No BM x 5 days, however +flatus, no abd pain, n/v. Miralax added to senna this morning. Per patient, not eating well due to DASH/Diet and she normally does not have a BM when her intake is low. Will liberalize diet as BP better controlled. Saddle anesthesia resolved. Mod pain at wound vac site.      MEDICATIONS  (STANDING):  acetaminophen   Tablet .. 650 milliGRAM(s) Oral every 6 hours  ascorbic acid 500 milliGRAM(s) Oral two times a day  calcium carbonate 1250 mG  + Vitamin D (OsCal 500 + D) 1 Tablet(s) Oral three times a day  carvedilol 12.5 milliGRAM(s) Oral every 12 hours  cyanocobalamin 1000 MICROGram(s) Oral daily  cyclobenzaprine 10 milliGRAM(s) Oral every 8 hours  DULoxetine 30 milliGRAM(s) Oral daily  folic acid 1 milliGRAM(s) Oral daily  hydrochlorothiazide 50 milliGRAM(s) Oral daily  losartan 100 milliGRAM(s) Oral daily  methylPREDNISolone 4 milliGRAM(s) Oral at bedtime  methylPREDNISolone   Oral   methylPREDNISolone 4 milliGRAM(s) Oral before breakfast  multivitamin 1 Tablet(s) Oral daily  pantoprazole    Tablet 40 milliGRAM(s) Oral before breakfast  senna 2 Tablet(s) Oral at bedtime    MEDICATIONS  (PRN):  acetaminophen   Tablet .. 650 milliGRAM(s) Oral every 6 hours PRN Temp greater or equal to 38C (100.4F), Mild Pain (1 - 3)  HYDROmorphone  Injectable 0.5 milliGRAM(s) SubCutaneous every 6 hours PRN breakthrough pain  magnesium hydroxide Suspension 30 milliLiter(s) Oral every 12 hours PRN Constipation  ondansetron Injectable 4 milliGRAM(s) IV Push every 6 hours PRN Nausea  oxyCODONE    IR 5 milliGRAM(s) Oral every 4 hours PRN Moderate Pain (4 - 6)  oxyCODONE    IR 10 milliGRAM(s) Oral every 4 hours PRN Severe Pain (7 - 10)      Objective:    Vitals: Vital Signs Last 24 Hrs  T(C): 37.4 (02-01-21 @ 04:46), Max: 37.4 (01-31-21 @ 23:36)  T(F): 99.3 (02-01-21 @ 04:46), Max: 99.3 (01-31-21 @ 23:36)  HR: 95 (02-01-21 @ 04:46) (75 - 95)  BP: 124/84 (02-01-21 @ 04:46) (118/75 - 137/87)  BP(mean): --  RR: 18 (02-01-21 @ 04:46) (18 - 20)  SpO2: 97% (02-01-21 @ 04:46) (97% - 99%)            I&O's Summary    31 Jan 2021 07:01  -  01 Feb 2021 07:00  --------------------------------------------------------  IN: 1530 mL / OUT: 363 mL / NET: 1167 mL      PHYSICAL EXAM:  GENERAL: obese female in NAD   HEENT: PERRL, no scleral icterus, no head and neck lad   CHEST/LUNG: CTAB, no wheezing, crackles, or ronchi   HEART: RRR, normal S1, S2, no murmurs, gallops, or rubs appreciated   ABDOMEN: soft, nondistended, non-tender, normoactive, no HSM, no rebound, no guarding, no rigidity  : patient deferred exam   SKIN: No rashes or lesions  MSK: wound vac in place over lumbosacral spine w/ scant sanguineous output   NERVOUS SYSTEM: Alert & Oriented X3, strength 5/5 BL LE, gross and fine touch intact bl LE   PSYCH: calm and cooperative     LABS:    02-01    133<L>  |  97  |  13  ----------------------------<  89  4.4   |  24  |  0.50  01-31    135  |  102  |  20  ----------------------------<  104<H>  3.9   |  26  |  0.69  01-30    136  |  99  |  24<H>  ----------------------------<  100<H>  4.1   |  28  |  0.61    Ca    8.7      01 Feb 2021 06:26  Ca    8.0<L>      31 Jan 2021 06:46  Ca    8.8      30 Jan 2021 06:40  Phos  2.7     02-01  Mg     1.9     02-01                                              12.6   14.84 )-----------( 186      ( 01 Feb 2021 06:25 )             36.0                         13.4   13.00 )-----------( 200      ( 31 Jan 2021 06:56 )             39.3                         15.4   12.38 )-----------( 277      ( 30 Jan 2021 06:42 )             43.6     CAPILLARY BLOOD GLUCOSE              RADIOLOGY & ADDITIONAL TESTS:            Imaging Personally Reviewed:  [ ] YES  [ ] NO    Consultants involved in case:   Consultant(s) Notes Reviewed:  [ ] YES  [ ] NO:   Care Discussed with Consultants/Other Providers [ ] YES  [ ] NO

## 2021-02-01 NOTE — DIETITIAN INITIAL EVALUATION ADULT. - PROBLEM SELECTOR PLAN 6
I spent 4 minutes with patient discussing tobacco use cessation. Patient counseled on available pharmacoligic interventions.    Billing Code:[id number]

## 2021-02-01 NOTE — PHYSICAL THERAPY INITIAL EVALUATION ADULT - ADDITIONAL COMMENTS
Pt lives with children (16y/o, 2y/o, 3 y/o) in an apartment with elevator access. Pt was Ind with all ADLs and amb without AD.
Pt lives with her 3 children (1 y/o, 5 y/o, 16 y/o) in an apartment with elevator access and no steps to enter. Pt has +tub shower. Pt was independent with all mobility prior to hospitalization.
Pt lives on 10th floor elevated apt, no steps to enter. Prior to admission, pt was I with all functional mobility and ADLs without AD. Does not own any DME.

## 2021-02-02 DIAGNOSIS — E87.1 HYPO-OSMOLALITY AND HYPONATREMIA: ICD-10-CM

## 2021-02-02 LAB
ANION GAP SERPL CALC-SCNC: 9 MMOL/L — SIGNIFICANT CHANGE UP (ref 5–17)
BASOPHILS # BLD AUTO: 0.04 K/UL — SIGNIFICANT CHANGE UP (ref 0–0.2)
BASOPHILS NFR BLD AUTO: 0.3 % — SIGNIFICANT CHANGE UP (ref 0–2)
BUN SERPL-MCNC: 10 MG/DL — SIGNIFICANT CHANGE UP (ref 7–23)
CALCIUM SERPL-MCNC: 9.2 MG/DL — SIGNIFICANT CHANGE UP (ref 8.4–10.5)
CHLORIDE SERPL-SCNC: 95 MMOL/L — LOW (ref 96–108)
CO2 SERPL-SCNC: 27 MMOL/L — SIGNIFICANT CHANGE UP (ref 22–31)
CREAT SERPL-MCNC: 0.52 MG/DL — SIGNIFICANT CHANGE UP (ref 0.5–1.3)
EOSINOPHIL # BLD AUTO: 0.17 K/UL — SIGNIFICANT CHANGE UP (ref 0–0.5)
EOSINOPHIL NFR BLD AUTO: 1.2 % — SIGNIFICANT CHANGE UP (ref 0–6)
GLUCOSE SERPL-MCNC: 101 MG/DL — HIGH (ref 70–99)
HCT VFR BLD CALC: 35.5 % — SIGNIFICANT CHANGE UP (ref 34.5–45)
HGB BLD-MCNC: 12.3 G/DL — SIGNIFICANT CHANGE UP (ref 11.5–15.5)
IMM GRANULOCYTES NFR BLD AUTO: 0.5 % — SIGNIFICANT CHANGE UP (ref 0–1.5)
LYMPHOCYTES # BLD AUTO: 19.2 % — SIGNIFICANT CHANGE UP (ref 13–44)
LYMPHOCYTES # BLD AUTO: 2.83 K/UL — SIGNIFICANT CHANGE UP (ref 1–3.3)
MAGNESIUM SERPL-MCNC: 2.1 MG/DL — SIGNIFICANT CHANGE UP (ref 1.6–2.6)
MCHC RBC-ENTMCNC: 29.6 PG — SIGNIFICANT CHANGE UP (ref 27–34)
MCHC RBC-ENTMCNC: 34.6 GM/DL — SIGNIFICANT CHANGE UP (ref 32–36)
MCV RBC AUTO: 85.5 FL — SIGNIFICANT CHANGE UP (ref 80–100)
MONOCYTES # BLD AUTO: 1.08 K/UL — HIGH (ref 0–0.9)
MONOCYTES NFR BLD AUTO: 7.3 % — SIGNIFICANT CHANGE UP (ref 2–14)
NEUTROPHILS # BLD AUTO: 10.54 K/UL — HIGH (ref 1.8–7.4)
NEUTROPHILS NFR BLD AUTO: 71.5 % — SIGNIFICANT CHANGE UP (ref 43–77)
NRBC # BLD: 0 /100 WBCS — SIGNIFICANT CHANGE UP (ref 0–0)
PHOSPHATE SERPL-MCNC: 3.4 MG/DL — SIGNIFICANT CHANGE UP (ref 2.5–4.5)
PLATELET # BLD AUTO: 149 K/UL — LOW (ref 150–400)
POTASSIUM SERPL-MCNC: 4 MMOL/L — SIGNIFICANT CHANGE UP (ref 3.5–5.3)
POTASSIUM SERPL-SCNC: 4 MMOL/L — SIGNIFICANT CHANGE UP (ref 3.5–5.3)
RBC # BLD: 4.15 M/UL — SIGNIFICANT CHANGE UP (ref 3.8–5.2)
RBC # FLD: 13.2 % — SIGNIFICANT CHANGE UP (ref 10.3–14.5)
SODIUM SERPL-SCNC: 131 MMOL/L — LOW (ref 135–145)
WBC # BLD: 14.74 K/UL — HIGH (ref 3.8–10.5)
WBC # FLD AUTO: 14.74 K/UL — HIGH (ref 3.8–10.5)

## 2021-02-02 PROCEDURE — 99232 SBSQ HOSP IP/OBS MODERATE 35: CPT | Mod: GC

## 2021-02-02 RX ORDER — HEPARIN SODIUM 5000 [USP'U]/ML
5000 INJECTION INTRAVENOUS; SUBCUTANEOUS EVERY 8 HOURS
Refills: 0 | Status: DISCONTINUED | OUTPATIENT
Start: 2021-02-02 | End: 2021-02-03

## 2021-02-02 RX ORDER — HYDROMORPHONE HYDROCHLORIDE 2 MG/ML
2 INJECTION INTRAMUSCULAR; INTRAVENOUS; SUBCUTANEOUS EVERY 4 HOURS
Refills: 0 | Status: DISCONTINUED | OUTPATIENT
Start: 2021-02-02 | End: 2021-02-03

## 2021-02-02 RX ADMIN — Medication 1 TABLET(S): at 21:56

## 2021-02-02 RX ADMIN — LOSARTAN POTASSIUM 100 MILLIGRAM(S): 100 TABLET, FILM COATED ORAL at 05:18

## 2021-02-02 RX ADMIN — HYDROMORPHONE HYDROCHLORIDE 2 MILLIGRAM(S): 2 INJECTION INTRAMUSCULAR; INTRAVENOUS; SUBCUTANEOUS at 21:56

## 2021-02-02 RX ADMIN — Medication 1 TABLET(S): at 10:03

## 2021-02-02 RX ADMIN — Medication 50 MILLIGRAM(S): at 05:17

## 2021-02-02 RX ADMIN — CARVEDILOL PHOSPHATE 12.5 MILLIGRAM(S): 80 CAPSULE, EXTENDED RELEASE ORAL at 05:17

## 2021-02-02 RX ADMIN — CARVEDILOL PHOSPHATE 12.5 MILLIGRAM(S): 80 CAPSULE, EXTENDED RELEASE ORAL at 16:14

## 2021-02-02 RX ADMIN — Medication 1 TABLET(S): at 16:13

## 2021-02-02 RX ADMIN — Medication 650 MILLIGRAM(S): at 05:18

## 2021-02-02 RX ADMIN — Medication 650 MILLIGRAM(S): at 16:14

## 2021-02-02 RX ADMIN — Medication 4 MILLIGRAM(S): at 06:39

## 2021-02-02 RX ADMIN — CYCLOBENZAPRINE HYDROCHLORIDE 10 MILLIGRAM(S): 10 TABLET, FILM COATED ORAL at 05:18

## 2021-02-02 RX ADMIN — POLYETHYLENE GLYCOL 3350 17 GRAM(S): 17 POWDER, FOR SOLUTION ORAL at 05:18

## 2021-02-02 RX ADMIN — Medication 1 MILLIGRAM(S): at 10:03

## 2021-02-02 RX ADMIN — Medication 500 MILLIGRAM(S): at 16:13

## 2021-02-02 RX ADMIN — Medication 1 TABLET(S): at 05:18

## 2021-02-02 RX ADMIN — Medication 500 MILLIGRAM(S): at 05:18

## 2021-02-02 RX ADMIN — Medication 5 MILLIGRAM(S): at 16:13

## 2021-02-02 RX ADMIN — PANTOPRAZOLE SODIUM 40 MILLIGRAM(S): 20 TABLET, DELAYED RELEASE ORAL at 06:39

## 2021-02-02 RX ADMIN — Medication 650 MILLIGRAM(S): at 23:09

## 2021-02-02 RX ADMIN — HYDROMORPHONE HYDROCHLORIDE 0.5 MILLIGRAM(S): 2 INJECTION INTRAMUSCULAR; INTRAVENOUS; SUBCUTANEOUS at 05:20

## 2021-02-02 RX ADMIN — Medication 650 MILLIGRAM(S): at 10:03

## 2021-02-02 RX ADMIN — HYDROMORPHONE HYDROCHLORIDE 2 MILLIGRAM(S): 2 INJECTION INTRAMUSCULAR; INTRAVENOUS; SUBCUTANEOUS at 16:13

## 2021-02-02 RX ADMIN — PREGABALIN 1000 MICROGRAM(S): 225 CAPSULE ORAL at 10:03

## 2021-02-02 NOTE — PROGRESS NOTE ADULT - ASSESSMENT
35F w/ class III obesity s/p gastric sleeve(2019; 100lb weight loss), chronic lower back pain with sciatica, HTN, chronic urinary incontinence presents with back pain and numbness over buttocks, groin, and left foot after fall admitted to medicine for further management. Physical exam is significant for decreased left leg strength 4/5 and decreased sensation of left hip, left foot and toes. MRI demonstrates compression of the cauda equina nerve roots by disc at L5-S1 s/p L5/S1 laminectomy / disectomy 1/30. Saddle anesthesia and LLE weakness resolved.

## 2021-02-02 NOTE — PROGRESS NOTE ADULT - PROBLEM SELECTOR PLAN 5
on senna and miralax  - +flatus - s/p gastric sleeve  - continue with vitamin supplementation  - b12, tsh, and A1C wnl  - nutrition recs appreciated

## 2021-02-02 NOTE — PROGRESS NOTE ADULT - PROBLEM SELECTOR PLAN 8
Transitions of Care Status: follow up with ortho outpatient clinic  1.  Name of PCP:  2.  PCP Contacted on Admission: [ ] Y    [ ] N    3.  PCP contacted at Discharge: [ ] Y    [ ] N    [ ] N/A  4.  Post-Discharge Appointment Date and Location:  5.  Summary of Handoff given to PCP: DVT ppx: lovenox   istop#: [id number]   01/02/2021 01/07/2021 oxycodone hcl 5 mg tablet  6 2 [id number] DVT ppx: lovenox   Diet: Regular  Dispo: acute rehab patient amenable   istop#: [id number]   01/02/2021 01/07/2021 oxycodone hcl 5 mg tablet  6 2 [id number]

## 2021-02-02 NOTE — PROGRESS NOTE ADULT - PROBLEM SELECTOR PROBLEM 4
Class 3 severe obesity with serious comorbidity and body mass index (BMI) of 50.0 to 59.9 in adult, unspecified obesity type Essential hypertension

## 2021-02-02 NOTE — PROGRESS NOTE ADULT - PROBLEM SELECTOR PLAN 3
- s/p gastric sleeve  - continue with vitamin supplementation  - b12, tsh, and A1C wnl  - nutrition recs appreciated - on senna and miralax  - +flatus - on senna and miralax BID   - +flatus  - start dulcolax 5mg BID

## 2021-02-02 NOTE — PROGRESS NOTE ADULT - PROBLEM SELECTOR PLAN 1
CT lumbar spine documents significant stenosis. MR spine demonstrates compression of cauda equina nerve roots at L5-S1. s/p L5/S1 laminectomy and diskectomy 1/30.   - completed medrol dose pack (end 2/1)  - POD 3 with 3 cc output from hemovac  - ortho recs appreciated   - pain control: d/c IV dilaudid, c/w oxy 5 q4 moderate pain, oxy 10 q4 severe pain   - OOB to chair today  - PT/OT recs appreciated: recommend d/c to acute rehab CT lumbar spine documents significant stenosis. MR spine demonstrates compression of cauda equina nerve roots at L5-S1. s/p L5/S1 laminectomy and diskectomy 1/30.   - completed medrol dose pack (end 2/1)  - POD 3 with 3 cc output from hemovac  - ortho recs appreciated   - pain control: d/c IV Dilaudid and PO oxycodone; will start dilaudid 2mg PO q4 hrs for mod-severe pain   - OOB to chair today  - PT/OT recs appreciated: recommend d/c to acute rehab  - PMNR consult  - bowel regimen as below

## 2021-02-02 NOTE — PROGRESS NOTE ADULT - SUBJECTIVE AND OBJECTIVE BOX
***************************************************************  Dr. Chey Saenz  Internal Medicine   [facility] Pager: [phone]  [facility] Pager: [phone]   ***************************************************************    VINNIE GARCIA  35y  MRN: [id number]    Subjective:    Patient is a 35y old  Female who presents with a chief complaint of 35F p/w back pain and numbness over buttocks, groin, and left foot after fall (02 Feb 2021 05:56)      Interval history/overnight events:    MUSHTAQ ON. Plan to d/c IV pain medication and continue oral pain regimen today.       MEDICATIONS  (STANDING):  acetaminophen   Tablet .. 650 milliGRAM(s) Oral every 6 hours  ascorbic acid 500 milliGRAM(s) Oral two times a day  calcium carbonate 1250 mG  + Vitamin D (OsCal 500 + D) 1 Tablet(s) Oral three times a day  carvedilol 12.5 milliGRAM(s) Oral every 12 hours  cyanocobalamin 1000 MICROGram(s) Oral daily  cyclobenzaprine 10 milliGRAM(s) Oral every 8 hours  DULoxetine 30 milliGRAM(s) Oral daily  folic acid 1 milliGRAM(s) Oral daily  heparin   Injectable 5000 Unit(s) SubCutaneous every 8 hours  hydrochlorothiazide 50 milliGRAM(s) Oral daily  losartan 100 milliGRAM(s) Oral daily  multivitamin 1 Tablet(s) Oral daily  pantoprazole    Tablet 40 milliGRAM(s) Oral before breakfast  polyethylene glycol 3350 17 Gram(s) Oral two times a day  senna 2 Tablet(s) Oral at bedtime    MEDICATIONS  (PRN):  acetaminophen   Tablet .. 650 milliGRAM(s) Oral every 6 hours PRN Temp greater or equal to 38C (100.4F), Mild Pain (1 - 3)  HYDROmorphone  Injectable 0.5 milliGRAM(s) SubCutaneous every 6 hours PRN breakthrough pain  magnesium hydroxide Suspension 30 milliLiter(s) Oral every 12 hours PRN Constipation  ondansetron Injectable 4 milliGRAM(s) IV Push every 6 hours PRN Nausea  oxyCODONE    IR 5 milliGRAM(s) Oral every 4 hours PRN Moderate Pain (4 - 6)  oxyCODONE    IR 10 milliGRAM(s) Oral every 4 hours PRN Severe Pain (7 - 10)        Objective:    Vitals: Vital Signs Last 24 Hrs  T(C): 36.8 (02-02-21 @ 04:26), Max: 36.9 (02-01-21 @ 07:57)  T(F): 98.3 (02-02-21 @ 04:26), Max: 98.5 (02-01-21 @ 23:41)  HR: 84 (02-02-21 @ 04:26) (78 - 95)  BP: 141/86 (02-02-21 @ 04:26) (111/60 - 142/89)  BP(mean): --  RR: 18 (02-02-21 @ 04:26) (18 - 18)  SpO2: 98% (02-02-21 @ 04:26) (94% - 100%)            I&O's Summary    01 Feb 2021 07:01  -  02 Feb 2021 07:00  --------------------------------------------------------  IN: 370 mL / OUT: 2011 mL / NET: -1641 mL    PHYSICAL EXAM:  GENERAL: obese female in NAD on RA   HEENT: PERRL, no scleral icterus, no head and neck lad   CHEST/LUNG: CTAB, no wheezing, crackles, or ronchi   HEART: RRR, normal S1, S2, no murmurs, gallops, or rubs appreciated   ABDOMEN: soft, nondistended, non-tender, normoactive, no HSM, no rebound, no guarding, no rigidity  : patient deferred exam   SKIN: No rashes or lesions  MSK: wound vac in place over lumbosacral spine w/ scant sanguineous output   NERVOUS SYSTEM: Alert & Oriented X3, strength 5/5 BL LE, gross and fine touch intact bl LE   PSYCH: calm and cooperative        LABS:    02-02    131<L>  |  95<L>  |  10  ----------------------------<  101<H>  4.0   |  27  |  0.52  02-01    133<L>  |  97  |  13  ----------------------------<  89  4.4   |  24  |  0.50  01-31    135  |  102  |  20  ----------------------------<  104<H>  3.9   |  26  |  0.69    Ca    9.2      02 Feb 2021 04:40  Ca    8.7      01 Feb 2021 06:26  Ca    8.0<L>      31 Jan 2021 06:46  Phos  3.4     02-02  Mg     2.1     02-02                                              12.3   14.74 )-----------( 149      ( 02 Feb 2021 04:40 )             35.5                         12.6   14.84 )-----------( 186      ( 01 Feb 2021 06:25 )             36.0                         13.4   13.00 )-----------( 200      ( 31 Jan 2021 06:56 )             39.3     CAPILLARY BLOOD GLUCOSE              RADIOLOGY & ADDITIONAL TESTS:            Imaging Personally Reviewed:  [ ] YES  [ ] NO    Consultants involved in case:   Consultant(s) Notes Reviewed:  [ ] YES  [ ] NO:   Care Discussed with Consultants/Other Providers [ ] YES  [ ] NO         ***************************************************************  Dr. Chey Saenz  Internal Medicine   [facility] Pager: [phone]  [facility] Pager: [phone]   ***************************************************************    VINNIE GARCIA  35y  MRN: [id number]    Subjective:    Patient is a 35y old  Female who presents with a chief complaint of 35F p/w back pain and numbness over buttocks, groin, and left foot after fall (02 Feb 2021 05:56)      Interval history/overnight events:    MUSHTAQ ON. No BM since 1/28. No nausea, vomiting, abd pain, fecal incontinence, +flatus. Dulcolax added to regimen. Small focal area under left buttock that remains numb otherwise weakness, paresthesias and lumbar pain from disc herniation resolved. Pain at hemovac site remains 7/10.  Plan to d/c IV pain dilaudid and continue oral pain regimen today. Per patient, oxy not as effective as dilaudid in regards to pain control prior to physical activity.       MEDICATIONS  (STANDING):  acetaminophen   Tablet .. 650 milliGRAM(s) Oral every 6 hours  ascorbic acid 500 milliGRAM(s) Oral two times a day  calcium carbonate 1250 mG  + Vitamin D (OsCal 500 + D) 1 Tablet(s) Oral three times a day  carvedilol 12.5 milliGRAM(s) Oral every 12 hours  cyanocobalamin 1000 MICROGram(s) Oral daily  cyclobenzaprine 10 milliGRAM(s) Oral every 8 hours  DULoxetine 30 milliGRAM(s) Oral daily  folic acid 1 milliGRAM(s) Oral daily  heparin   Injectable 5000 Unit(s) SubCutaneous every 8 hours  hydrochlorothiazide 50 milliGRAM(s) Oral daily  losartan 100 milliGRAM(s) Oral daily  multivitamin 1 Tablet(s) Oral daily  pantoprazole    Tablet 40 milliGRAM(s) Oral before breakfast  polyethylene glycol 3350 17 Gram(s) Oral two times a day  senna 2 Tablet(s) Oral at bedtime    MEDICATIONS  (PRN):  acetaminophen   Tablet .. 650 milliGRAM(s) Oral every 6 hours PRN Temp greater or equal to 38C (100.4F), Mild Pain (1 - 3)  HYDROmorphone  Injectable 0.5 milliGRAM(s) SubCutaneous every 6 hours PRN breakthrough pain  magnesium hydroxide Suspension 30 milliLiter(s) Oral every 12 hours PRN Constipation  ondansetron Injectable 4 milliGRAM(s) IV Push every 6 hours PRN Nausea  oxyCODONE    IR 5 milliGRAM(s) Oral every 4 hours PRN Moderate Pain (4 - 6)  oxyCODONE    IR 10 milliGRAM(s) Oral every 4 hours PRN Severe Pain (7 - 10)        Objective:    Vitals: Vital Signs Last 24 Hrs  T(C): 36.8 (02-02-21 @ 04:26), Max: 36.9 (02-01-21 @ 07:57)  T(F): 98.3 (02-02-21 @ 04:26), Max: 98.5 (02-01-21 @ 23:41)  HR: 84 (02-02-21 @ 04:26) (78 - 95)  BP: 141/86 (02-02-21 @ 04:26) (111/60 - 142/89)  BP(mean): --  RR: 18 (02-02-21 @ 04:26) (18 - 18)  SpO2: 98% (02-02-21 @ 04:26) (94% - 100%)            I&O's Summary    01 Feb 2021 07:01  -  02 Feb 2021 07:00  --------------------------------------------------------  IN: 370 mL / OUT: 2011 mL / NET: -1641 mL    PHYSICAL EXAM:  GENERAL: obese female in NAD on RA   HEENT: PERRL, no scleral icterus, no head and neck lad   CHEST/LUNG: CTAB, no wheezing, crackles, or ronchi   HEART: RRR, normal S1, S2, no murmurs, gallops, or rubs appreciated   ABDOMEN: soft, nondistended, non-tender, normoactive, no HSM, no rebound, no guarding, no rigidity  : patient deferred rectal tone exam  SKIN: No rashes or lesions  MSK: hemovac  in place over lumbosacral spine w/ scant sanguineous output   NERVOUS SYSTEM: Alert & Oriented X3, strength 5/5 BL LE, gross and fine touch intact bl LE. No saddle anesthesia.  PSYCH: calm and cooperative        LABS:    02-02    131<L>  |  95<L>  |  10  ----------------------------<  101<H>  4.0   |  27  |  0.52  02-01    133<L>  |  97  |  13  ----------------------------<  89  4.4   |  24  |  0.50  01-31    135  |  102  |  20  ----------------------------<  104<H>  3.9   |  26  |  0.69    Ca    9.2      02 Feb 2021 04:40  Ca    8.7      01 Feb 2021 06:26  Ca    8.0<L>      31 Jan 2021 06:46  Phos  3.4     02-02  Mg     2.1     02-02                                              12.3   14.74 )-----------( 149      ( 02 Feb 2021 04:40 )             35.5                         12.6   14.84 )-----------( 186      ( 01 Feb 2021 06:25 )             36.0                         13.4   13.00 )-----------( 200      ( 31 Jan 2021 06:56 )             39.3     CAPILLARY BLOOD GLUCOSE              RADIOLOGY & ADDITIONAL TESTS:            Imaging Personally Reviewed:  [ ] YES  [ ] NO    Consultants involved in case:   Consultant(s) Notes Reviewed:  [ ] YES  [ ] NO:   Care Discussed with Consultants/Other Providers [ ] YES  [ ] NO

## 2021-02-02 NOTE — PROGRESS NOTE ADULT - PROBLEM SELECTOR PLAN 2
SBPs better controlled   -c/w carvedilol 12.5mg BID  -c/w home losartan 100 mg   -c/w HCTZ 50 mg Likely hypovolemic hyponatremia given low po intake  - continue with liberalized diet (previously DASH)  - encourage PO intake   - other ddx pain-induced SIADH, check UOSM, Serum OSM, Urine Na

## 2021-02-02 NOTE — PROGRESS NOTE ADULT - PROBLEM SELECTOR PROBLEM 6
Tobacco abuse counseling Class 3 severe obesity with serious comorbidity and body mass index (BMI) of 50.0 to 59.9 in adult, unspecified obesity type

## 2021-02-02 NOTE — PROGRESS NOTE ADULT - SUBJECTIVE AND OBJECTIVE BOX
Orthopaedic Surgery Progress Note    Subjective:   Patient seen and examined today. No acute events overnight. Pain is well controlled. Denies f/c, chest pain, sob, dizziness.    Objective:  Vital Signs Last 24 Hrs  T(C): 36.8 (02 Feb 2021 04:26), Max: 36.9 (01 Feb 2021 07:57)  T(F): 98.3 (02 Feb 2021 04:26), Max: 98.5 (01 Feb 2021 23:41)  HR: 84 (02 Feb 2021 04:26) (78 - 95)  BP: 141/86 (02 Feb 2021 04:26) (111/60 - 142/89)  BP(mean): --  RR: 18 (02 Feb 2021 04:26) (18 - 18)  SpO2: 98% (02 Feb 2021 04:26) (94% - 100%)    PE    NAD  Back:   dressing C/D/I, mild appropriate smita-incisional ttp  HMV in place w/ serosanguinous output  Neuro:  RLE IP 5/5 HS 5/5 Q 5/5 GS 5/5 TA 5/5 EHL 5/5   SILT L2-S1  LLE IP 5/5 HS 5/5 Q 5/5 GS 5/5 TA 5/5 EHL 5/5  SILT L2-S1  WWP BLE                                               12.3   14.74 )-----------( 149      ( 02 Feb 2021 04:40 )             35.5     02-02    131<L>  |  95<L>  |  10  ----------------------------<  101<H>  4.0   |  27  |  0.52    Ca    9.2      02 Feb 2021 04:40  Phos  3.4     02-02  Mg     2.1     02-02

## 2021-02-02 NOTE — PROGRESS NOTE ADULT - PROBLEM SELECTOR PLAN 7
DVT ppx: lovenox   istop#: [id number]   01/02/2021 01/07/2021 oxycodone hcl 5 mg tablet  6 2 [id number] - smoking cessation  counselling provided

## 2021-02-02 NOTE — PROGRESS NOTE ADULT - ASSESSMENT
35y Female s/p L5-S1 lami/disc, POD#3  - Pain control  - WBAT  - PT/OT/OOB  - I/S  - Monitor HMV output  - SCDs  - Dispo planning

## 2021-02-02 NOTE — PROGRESS NOTE ADULT - ATTENDING COMMENTS
-Patient seen/examined on 2/2/21. Case/plan discussed with the house staff as reviewed by me above and in any comments below.  -Ortho recs appreciated.   -Switched IV dilaudid to oral dilaudid PRN only.   -DC planning to acute rehab. PMNR consulted via email. Patient amenable.

## 2021-02-02 NOTE — PROGRESS NOTE ADULT - PROBLEM SELECTOR PLAN 4
- outpatient f/u obesity clinic SBPs better controlled   -c/w carvedilol 12.5mg BID  -c/w home losartan 100 mg   -c/w HCTZ 50 mg

## 2021-02-03 ENCOUNTER — TRANSCRIPTION ENCOUNTER (OUTPATIENT)
Age: 36
End: 2021-02-03

## 2021-02-03 VITALS
HEART RATE: 89 BPM | SYSTOLIC BLOOD PRESSURE: 123 MMHG | OXYGEN SATURATION: 98 % | DIASTOLIC BLOOD PRESSURE: 80 MMHG | RESPIRATION RATE: 18 BRPM | TEMPERATURE: 98 F

## 2021-02-03 LAB
ANION GAP SERPL CALC-SCNC: 15 MMOL/L — SIGNIFICANT CHANGE UP (ref 5–17)
BUN SERPL-MCNC: 15 MG/DL — SIGNIFICANT CHANGE UP (ref 7–23)
CALCIUM SERPL-MCNC: 9.4 MG/DL — SIGNIFICANT CHANGE UP (ref 8.4–10.5)
CHLORIDE SERPL-SCNC: 97 MMOL/L — SIGNIFICANT CHANGE UP (ref 96–108)
CO2 SERPL-SCNC: 23 MMOL/L — SIGNIFICANT CHANGE UP (ref 22–31)
CREAT SERPL-MCNC: 0.49 MG/DL — LOW (ref 0.5–1.3)
GLUCOSE SERPL-MCNC: 79 MG/DL — SIGNIFICANT CHANGE UP (ref 70–99)
MAGNESIUM SERPL-MCNC: 2.2 MG/DL — SIGNIFICANT CHANGE UP (ref 1.6–2.6)
OSMOLALITY SERPL: 283 MOSMOL/KG — SIGNIFICANT CHANGE UP (ref 275–300)
OSMOLALITY UR: 813 MOSM/KG — SIGNIFICANT CHANGE UP (ref 50–1200)
PHOSPHATE SERPL-MCNC: 3.5 MG/DL — SIGNIFICANT CHANGE UP (ref 2.5–4.5)
POTASSIUM SERPL-MCNC: 4.9 MMOL/L — SIGNIFICANT CHANGE UP (ref 3.5–5.3)
POTASSIUM SERPL-SCNC: 4.9 MMOL/L — SIGNIFICANT CHANGE UP (ref 3.5–5.3)
SODIUM SERPL-SCNC: 135 MMOL/L — SIGNIFICANT CHANGE UP (ref 135–145)
SODIUM UR-SCNC: 95 MMOL/L — SIGNIFICANT CHANGE UP
SURGICAL PATHOLOGY STUDY: SIGNIFICANT CHANGE UP

## 2021-02-03 PROCEDURE — 97164 PT RE-EVAL EST PLAN CARE: CPT

## 2021-02-03 PROCEDURE — 84300 ASSAY OF URINE SODIUM: CPT

## 2021-02-03 PROCEDURE — 85610 PROTHROMBIN TIME: CPT

## 2021-02-03 PROCEDURE — 86900 BLOOD TYPING SEROLOGIC ABO: CPT

## 2021-02-03 PROCEDURE — C9399: CPT

## 2021-02-03 PROCEDURE — 97168 OT RE-EVAL EST PLAN CARE: CPT

## 2021-02-03 PROCEDURE — 72020 X-RAY EXAM OF SPINE 1 VIEW: CPT

## 2021-02-03 PROCEDURE — 84443 ASSAY THYROID STIM HORMONE: CPT

## 2021-02-03 PROCEDURE — 96374 THER/PROPH/DIAG INJ IV PUSH: CPT

## 2021-02-03 PROCEDURE — 85025 COMPLETE CBC W/AUTO DIFF WBC: CPT

## 2021-02-03 PROCEDURE — 97530 THERAPEUTIC ACTIVITIES: CPT

## 2021-02-03 PROCEDURE — 83036 HEMOGLOBIN GLYCOSYLATED A1C: CPT

## 2021-02-03 PROCEDURE — 72132 CT LUMBAR SPINE W/DYE: CPT

## 2021-02-03 PROCEDURE — 99221 1ST HOSP IP/OBS SF/LOW 40: CPT

## 2021-02-03 PROCEDURE — 88304 TISSUE EXAM BY PATHOLOGIST: CPT

## 2021-02-03 PROCEDURE — 99233 SBSQ HOSP IP/OBS HIGH 50: CPT

## 2021-02-03 PROCEDURE — 83930 ASSAY OF BLOOD OSMOLALITY: CPT

## 2021-02-03 PROCEDURE — 97165 OT EVAL LOW COMPLEX 30 MIN: CPT

## 2021-02-03 PROCEDURE — 85730 THROMBOPLASTIN TIME PARTIAL: CPT

## 2021-02-03 PROCEDURE — 84702 CHORIONIC GONADOTROPIN TEST: CPT

## 2021-02-03 PROCEDURE — 85027 COMPLETE CBC AUTOMATED: CPT

## 2021-02-03 PROCEDURE — 86901 BLOOD TYPING SEROLOGIC RH(D): CPT

## 2021-02-03 PROCEDURE — 73502 X-RAY EXAM HIP UNI 2-3 VIEWS: CPT

## 2021-02-03 PROCEDURE — 82550 ASSAY OF CK (CPK): CPT

## 2021-02-03 PROCEDURE — 72148 MRI LUMBAR SPINE W/O DYE: CPT

## 2021-02-03 PROCEDURE — 97535 SELF CARE MNGMENT TRAINING: CPT

## 2021-02-03 PROCEDURE — U0003: CPT

## 2021-02-03 PROCEDURE — U0005: CPT

## 2021-02-03 PROCEDURE — 97116 GAIT TRAINING THERAPY: CPT

## 2021-02-03 PROCEDURE — 86850 RBC ANTIBODY SCREEN: CPT

## 2021-02-03 PROCEDURE — 71045 X-RAY EXAM CHEST 1 VIEW: CPT

## 2021-02-03 PROCEDURE — 83935 ASSAY OF URINE OSMOLALITY: CPT

## 2021-02-03 PROCEDURE — 93005 ELECTROCARDIOGRAM TRACING: CPT

## 2021-02-03 PROCEDURE — 81025 URINE PREGNANCY TEST: CPT

## 2021-02-03 PROCEDURE — 83735 ASSAY OF MAGNESIUM: CPT

## 2021-02-03 PROCEDURE — 82607 VITAMIN B-12: CPT

## 2021-02-03 PROCEDURE — 80053 COMPREHEN METABOLIC PANEL: CPT

## 2021-02-03 PROCEDURE — 84100 ASSAY OF PHOSPHORUS: CPT

## 2021-02-03 PROCEDURE — C1889: CPT

## 2021-02-03 PROCEDURE — 72170 X-RAY EXAM OF PELVIS: CPT

## 2021-02-03 PROCEDURE — 99285 EMERGENCY DEPT VISIT HI MDM: CPT | Mod: 25

## 2021-02-03 PROCEDURE — 80048 BASIC METABOLIC PNL TOTAL CA: CPT

## 2021-02-03 RX ORDER — ACETAMINOPHEN 500 MG
2 TABLET ORAL
Qty: 0 | Refills: 0 | DISCHARGE
Start: 2021-02-03

## 2021-02-03 RX ORDER — CYCLOBENZAPRINE HYDROCHLORIDE 10 MG/1
0 TABLET, FILM COATED ORAL
Qty: 0 | Refills: 0 | DISCHARGE

## 2021-02-03 RX ORDER — CYCLOBENZAPRINE HYDROCHLORIDE 10 MG/1
1 TABLET, FILM COATED ORAL
Qty: 21 | Refills: 0
Start: 2021-02-03 | End: 2021-02-09

## 2021-02-03 RX ORDER — OXYCODONE HYDROCHLORIDE 5 MG/1
1 TABLET ORAL
Qty: 0 | Refills: 0 | DISCHARGE

## 2021-02-03 RX ORDER — MELOXICAM 15 MG/1
0 TABLET ORAL
Qty: 0 | Refills: 0 | DISCHARGE

## 2021-02-03 RX ORDER — SENNA PLUS 8.6 MG/1
2 TABLET ORAL
Qty: 0 | Refills: 0 | DISCHARGE
Start: 2021-02-03

## 2021-02-03 RX ORDER — POLYETHYLENE GLYCOL 3350 17 G/17G
17 POWDER, FOR SOLUTION ORAL
Qty: 0 | Refills: 0 | DISCHARGE
Start: 2021-02-03

## 2021-02-03 RX ORDER — PANTOPRAZOLE SODIUM 20 MG/1
1 TABLET, DELAYED RELEASE ORAL
Qty: 30 | Refills: 0
Start: 2021-02-03 | End: 2021-03-04

## 2021-02-03 RX ORDER — ASCORBIC ACID 60 MG
1 TABLET,CHEWABLE ORAL
Qty: 0 | Refills: 0 | DISCHARGE
Start: 2021-02-03

## 2021-02-03 RX ORDER — LOSARTAN/HYDROCHLOROTHIAZIDE 100MG-25MG
1 TABLET ORAL
Qty: 0 | Refills: 0 | DISCHARGE

## 2021-02-03 RX ORDER — CARVEDILOL PHOSPHATE 80 MG/1
1 CAPSULE, EXTENDED RELEASE ORAL
Qty: 60 | Refills: 0
Start: 2021-02-03 | End: 2021-03-04

## 2021-02-03 RX ORDER — LOSARTAN POTASSIUM 100 MG/1
1 TABLET, FILM COATED ORAL
Qty: 30 | Refills: 0
Start: 2021-02-03 | End: 2021-03-04

## 2021-02-03 RX ORDER — HYDROMORPHONE HYDROCHLORIDE 2 MG/ML
1 INJECTION INTRAMUSCULAR; INTRAVENOUS; SUBCUTANEOUS
Qty: 18 | Refills: 0
Start: 2021-02-03 | End: 2021-02-05

## 2021-02-03 RX ADMIN — CARVEDILOL PHOSPHATE 12.5 MILLIGRAM(S): 80 CAPSULE, EXTENDED RELEASE ORAL at 17:12

## 2021-02-03 RX ADMIN — Medication 650 MILLIGRAM(S): at 05:47

## 2021-02-03 RX ADMIN — LOSARTAN POTASSIUM 100 MILLIGRAM(S): 100 TABLET, FILM COATED ORAL at 05:47

## 2021-02-03 RX ADMIN — HYDROMORPHONE HYDROCHLORIDE 2 MILLIGRAM(S): 2 INJECTION INTRAMUSCULAR; INTRAVENOUS; SUBCUTANEOUS at 10:45

## 2021-02-03 RX ADMIN — CARVEDILOL PHOSPHATE 12.5 MILLIGRAM(S): 80 CAPSULE, EXTENDED RELEASE ORAL at 05:46

## 2021-02-03 RX ADMIN — Medication 1 TABLET(S): at 10:47

## 2021-02-03 RX ADMIN — Medication 50 MILLIGRAM(S): at 05:46

## 2021-02-03 RX ADMIN — Medication 650 MILLIGRAM(S): at 14:14

## 2021-02-03 RX ADMIN — Medication 1 MILLIGRAM(S): at 10:47

## 2021-02-03 RX ADMIN — Medication 500 MILLIGRAM(S): at 17:12

## 2021-02-03 RX ADMIN — PANTOPRAZOLE SODIUM 40 MILLIGRAM(S): 20 TABLET, DELAYED RELEASE ORAL at 05:47

## 2021-02-03 RX ADMIN — PREGABALIN 1000 MICROGRAM(S): 225 CAPSULE ORAL at 10:47

## 2021-02-03 RX ADMIN — Medication 500 MILLIGRAM(S): at 05:46

## 2021-02-03 RX ADMIN — Medication 1 TABLET(S): at 05:46

## 2021-02-03 NOTE — PROGRESS NOTE ADULT - PROBLEM SELECTOR PLAN 4
- s/p gastric sleeve  - continue with vitamin supplementation  - b12, tsh, and A1C wnl  - nutrition recs appreciated

## 2021-02-03 NOTE — PROGRESS NOTE ADULT - ASSESSMENT
35y Female s/p L5-S1 lami/disc, POD#4  - Pain control  - WBAT  - PT/OT/OOB  - I/S  - Monitor HMV output  - SCDs  - Dispo planning: acute rehab 35y Female s/p L5-S1 lami/disc, POD#4    -HMV drain was removed this AM  -Dressing changed   - Pain control  - WBAT  - PT/OT/OOB  - I/S  - Monitor HMV output  - SCDs  - Dispo planning: acute rehab  -No further ortho intervention  -ortho stable for DC   -d/w attending

## 2021-02-03 NOTE — CONSULT NOTE ADULT - SUBJECTIVE AND OBJECTIVE BOX
Patient is a 35y old  Female who presents with a chief complaint of 35F p/w back pain and numbness over buttocks, groin, and left foot after fall (2021 06:07)    Admission HPI:  35F w/ class III obesity s/p gastric sleeve(2019; 100lb weight loss), chronic lower back pain with sciatica, HTN, chronic urinary incontinence presents with back pain and numbness over buttocks, groin, and left foot after fall. The patient reports that she had a mechanical fall in the hallway of her home the evening prior to presentation and landed on her left sided without headstrike. She reports instantaneously developing severe 10/10, burning pain over lower back over tailbone, associated with numbness off her left buttock and groin, numbness of left 4th-6th toe, worsened when seated upright or when walking, improved when lying on the side, not improved with rest. She presented to the hospital because it did not improve over the day, the pain was in a new location and more severe than previous, and the sensation of numbness was a new symptom. She denies paralysis of the limbs. She does have chronic urinary incontinence but did not have bowel incontinence. She denies fever, chills, CP, palpitations, sob, cough, n/v/d, or painful urination.    In the ED, VS 98.1, 179/95, 85, 20 99%RA  s/p acetaminophen 975x1, oxycodone 5mgx1 (2021 01:40)    Interval History:  Patient s/p L5-S1 laminectomy on .  Course has been complicated by hyponatremia and constipation.     REVIEW OF SYSTEMS: No chest pain, shortness of breath, nausea, vomiting or diarhea; other ROS neg     PAST MEDICAL & SURGICAL HISTORY  Class 3 severe obesity in adult  Hypertension  H/O bariatric surgery   delivery delivered    FUNCTIONAL HISTORY:   Lives in apartment w 3 kids- elevator access.  PTA Independent    CURRENT FUNCTIONAL STATUS:  Min A transfers, Mod A gait.     FAMILY HISTORY   No pertinent family history in first degree relatives    MEDICATIONS   acetaminophen   Tablet .. 650 milliGRAM(s) Oral every 6 hours PRN  acetaminophen   Tablet .. 650 milliGRAM(s) Oral every 6 hours  ascorbic acid 500 milliGRAM(s) Oral two times a day  bisacodyl 5 milliGRAM(s) Oral every 12 hours  calcium carbonate 1250 mG  + Vitamin D (OsCal 500 + D) 1 Tablet(s) Oral three times a day  carvedilol 12.5 milliGRAM(s) Oral every 12 hours  cyanocobalamin 1000 MICROGram(s) Oral daily  cyclobenzaprine 10 milliGRAM(s) Oral every 8 hours  folic acid 1 milliGRAM(s) Oral daily  heparin   Injectable 5000 Unit(s) SubCutaneous every 8 hours  hydrochlorothiazide 50 milliGRAM(s) Oral daily  HYDROmorphone   Tablet 2 milliGRAM(s) Oral every 4 hours PRN  losartan 100 milliGRAM(s) Oral daily  magnesium hydroxide Suspension 30 milliLiter(s) Oral every 12 hours PRN  multivitamin 1 Tablet(s) Oral daily  ondansetron Injectable 4 milliGRAM(s) IV Push every 6 hours PRN  pantoprazole    Tablet 40 milliGRAM(s) Oral before breakfast  polyethylene glycol 3350 17 Gram(s) Oral two times a day  senna 2 Tablet(s) Oral at bedtime      ALLERGIES  No Known Drug Allergies  peanuts (Hives)      VITALS  T(C): 36.7 (21 @ 07:22), Max: 37 (21 @ 05:26)  HR: 90 (21 @ 09:16) (76 - 100)  BP: 140/86 (21 @ 09:16) (100/65 - 142/83)  RR: 18 (21 @ 07:22) (18 - 18)  SpO2: 94% (21 @ 07:22) (94% - 98%)  Wt(kg): --    PHYSICAL EXAM  Constitutional - NAD, Comfortable  HEENT - NCAT, EOMI  Neck - Supple, No limited ROM  Chest - CTA bilaterally, No wheeze, No rhonchi, No crackles  Cardiovascular - RRR, S1S2, No murmurs  Abdomen - BS+, Soft, NTND  Extremities - No C/C/E, No calf tenderness   Neurologic Exam -                    Cognitive - Awake, Alert, AAO to self, place, date, year, situation     Communication - Fluent, No dysarthria, no aphasia     Cranial Nerves - CN 2-12 intact     Motor - No focal deficits      Sensory - Intact to LT     Reflexes - DTR Intact, No primitive reflexive  Psychiatric - Mood stable, Affect WNL    RECENT LABS/IMAGING  CBC Full  -  ( 2021 04:40 )  WBC Count : 14.74 K/uL  RBC Count : 4.15 M/uL  Hemoglobin : 12.3 g/dL  Hematocrit : 35.5 %  Platelet Count - Automated : 149 K/uL  Mean Cell Volume : 85.5 fl  Mean Cell Hemoglobin : 29.6 pg  Mean Cell Hemoglobin Concentration : 34.6 gm/dL  Auto Neutrophil # : 10.54 K/uL  Auto Lymphocyte # : 2.83 K/uL  Auto Monocyte # : 1.08 K/uL  Auto Eosinophil # : 0.17 K/uL  Auto Basophil # : 0.04 K/uL  Auto Neutrophil % : 71.5 %  Auto Lymphocyte % : 19.2 %  Auto Monocyte % : 7.3 %  Auto Eosinophil % : 1.2 %  Auto Basophil % : 0.3 %    -    135  |  97  |  15  ----------------------------<  79  4.9   |  23  |  0.49<L>    Ca    9.4      2021 06:57  Phos  3.5     02-03  Mg     2.2     02-03    Impression:   34 yo with functional deficits secondary to diagnosis of lumbar radiculopathy    Plan:  PT- ROM, Bed Mob, Transfers, Amb w AD and bracing as needed  OT- ADLs, bracing  Prec- Falls, Cardiac  DVT Prophylaxis- Lovenox  Skin- Turn q2 h  Dispo-  Patient is a 35y old  Female who presents with a chief complaint of 35F p/w back pain and numbness over buttocks, groin, and left foot after fall (2021 06:07)    Admission HPI:  35F w/ class III obesity s/p gastric sleeve(2019; 100lb weight loss), chronic lower back pain with sciatica, HTN, chronic urinary incontinence presents with back pain and numbness over buttocks, groin, and left foot after fall. The patient reports that she had a mechanical fall in the hallway of her home the evening prior to presentation and landed on her left sided without headstrike. She reports instantaneously developing severe 10/10, burning pain over lower back over tailbone, associated with numbness off her left buttock and groin, numbness of left 4th-6th toe, worsened when seated upright or when walking, improved when lying on the side, not improved with rest. She presented to the hospital because it did not improve over the day, the pain was in a new location and more severe than previous, and the sensation of numbness was a new symptom. She denies paralysis of the limbs. She does have chronic urinary incontinence but did not have bowel incontinence. She denies fever, chills, CP, palpitations, sob, cough, n/v/d, or painful urination.    In the ED, VS 98.1, 179/95, 85, 20 99%RA  s/p acetaminophen 975x1, oxycodone 5mgx1 (2021 01:40)    Interval History:  Patient s/p L5-S1 laminectomy on .  Course has been complicated by hyponatremia and constipation.     REVIEW OF SYSTEMS: + back pain, + poor balance, No chest pain, shortness of breath, nausea, vomiting or diarhea; other ROS neg     PAST MEDICAL & SURGICAL HISTORY  Class 3 severe obesity in adult  Hypertension  H/O bariatric surgery   delivery delivered    FUNCTIONAL HISTORY:   Lives in apartment w 3 kids- elevator access.  PTA Independent    CURRENT FUNCTIONAL STATUS:  Min A transfers, Mod A gait.     FAMILY HISTORY   No pertinent family history in first degree relatives    MEDICATIONS   acetaminophen   Tablet .. 650 milliGRAM(s) Oral every 6 hours PRN  acetaminophen   Tablet .. 650 milliGRAM(s) Oral every 6 hours  ascorbic acid 500 milliGRAM(s) Oral two times a day  bisacodyl 5 milliGRAM(s) Oral every 12 hours  calcium carbonate 1250 mG  + Vitamin D (OsCal 500 + D) 1 Tablet(s) Oral three times a day  carvedilol 12.5 milliGRAM(s) Oral every 12 hours  cyanocobalamin 1000 MICROGram(s) Oral daily  cyclobenzaprine 10 milliGRAM(s) Oral every 8 hours  folic acid 1 milliGRAM(s) Oral daily  heparin   Injectable 5000 Unit(s) SubCutaneous every 8 hours  hydrochlorothiazide 50 milliGRAM(s) Oral daily  HYDROmorphone   Tablet 2 milliGRAM(s) Oral every 4 hours PRN  losartan 100 milliGRAM(s) Oral daily  magnesium hydroxide Suspension 30 milliLiter(s) Oral every 12 hours PRN  multivitamin 1 Tablet(s) Oral daily  ondansetron Injectable 4 milliGRAM(s) IV Push every 6 hours PRN  pantoprazole    Tablet 40 milliGRAM(s) Oral before breakfast  polyethylene glycol 3350 17 Gram(s) Oral two times a day  senna 2 Tablet(s) Oral at bedtime      ALLERGIES  No Known Drug Allergies  peanuts (Hives)      VITALS  T(C): 36.7 (21 @ 07:22), Max: 37 (21 @ 05:26)  HR: 90 (21 @ 09:16) (76 - 100)  BP: 140/86 (21 @ 09:16) (100/65 - 142/83)  RR: 18 (21 @ 07:22) (18 - 18)  SpO2: 94% (21 @ 07:22) (94% - 98%)  Wt(kg): --    PHYSICAL EXAM  Constitutional - NAD, Comfortable  HEENT - NCAT, EOMI  Neck - Supple, No limited ROM  Chest - CTA bilaterally, No wheeze, No rhonchi, No crackles  Cardiovascular - RRR, S1S2, No murmurs  Abdomen - Obese  Extremities - No C/C/E, No calf tenderness   Neurologic Exam -                 AAO x 3  Motor normal grade.    Psychiatric - Mood stable, Affect WNL    RECENT LABS/IMAGING  CBC Full  -  ( 2021 04:40 )  WBC Count : 14.74 K/uL  RBC Count : 4.15 M/uL  Hemoglobin : 12.3 g/dL  Hematocrit : 35.5 %  Platelet Count - Automated : 149 K/uL  Mean Cell Volume : 85.5 fl  Mean Cell Hemoglobin : 29.6 pg  Mean Cell Hemoglobin Concentration : 34.6 gm/dL  Auto Neutrophil # : 10.54 K/uL  Auto Lymphocyte # : 2.83 K/uL  Auto Monocyte # : 1.08 K/uL  Auto Eosinophil # : 0.17 K/uL  Auto Basophil # : 0.04 K/uL  Auto Neutrophil % : 71.5 %  Auto Lymphocyte % : 19.2 %  Auto Monocyte % : 7.3 %  Auto Eosinophil % : 1.2 %  Auto Basophil % : 0.3 %    02-03    135  |  97  |  15  ----------------------------<  79  4.9   |  23  |  0.49<L>    Ca    9.4      2021 06:57  Phos  3.5     02-03  Mg     2.2     02-03    Impression:   36 yo with functional deficits secondary to diagnosis of lumbar radiculopathy    Plan:  PT- ROM, Bed Mob, Transfers, Amb w AD and bracing as needed  OT- ADLs, bracing  Prec- Falls, Cardiac  DVT Prophylaxis- Lovenox  Skin- Turn q2 h  Dispo- Patient strongly prefers to go home with home therapies- explained she would benefit from acute rehab and discussed concern about possible fall.

## 2021-02-03 NOTE — PROGRESS NOTE ADULT - SUBJECTIVE AND OBJECTIVE BOX
Orthopaedic Surgery Progress Note    Subjective:   Patient seen and examined today. No acute events overnight. Pain is well controlled. Denies f/c, chest pain, sob, dizziness. States walked with PT yesterday    Objective:  T(C): 37 (02-03-21 @ 05:26), Max: 37 (02-03-21 @ 05:26)  HR: 96 (02-03-21 @ 05:26) (76 - 100)  BP: 125/81 (02-03-21 @ 05:26) (100/65 - 142/83)  RR: 18 (02-03-21 @ 05:26) (18 - 18)  SpO2: 94% (02-03-21 @ 05:26) (94% - 98%)  Wt(kg): --    02-01 @ 07:01  -  02-02 @ 07:00  --------------------------------------------------------  IN: 370 mL / OUT: 2011 mL / NET: -1641 mL    02-02 @ 07:01  -  02-03 @ 06:07  --------------------------------------------------------  IN: 480 mL / OUT: 900 mL / NET: -420 mL        PE    NAD  Back:   dressing C/D/I, mild appropriate smita-incisional ttp  HMV in place w/ serosanguinous output  Neuro:  RLE IP 5/5 HS 5/5 Q 5/5 GS 5/5 TA 5/5 EHL 5/5   SILT L2-S1  LLE IP 5/5 HS 5/5 Q 5/5 GS 5/5 TA 5/5 EHL 5/5  SILT L2-S1  WWP BLE                          12.3   14.74 )-----------( 149      ( 02 Feb 2021 04:40 )             35.5     02-02    131<L>  |  95<L>  |  10  ----------------------------<  101<H>  4.0   |  27  |  0.52    Ca    9.2      02 Feb 2021 04:40  Phos  3.4     02-02  Mg     2.1     02-02

## 2021-02-03 NOTE — PROGRESS NOTE ADULT - PROVIDER SPECIALTY LIST ADULT
Internal Medicine
Orthopedics
Internal Medicine
Hospitalist

## 2021-02-03 NOTE — PROGRESS NOTE ADULT - PROBLEM SELECTOR PLAN 7
DVT ppx: lovenox   Diet: Regular  Dispo: acute rehab   istop#: [id number]   01/02/2021 01/07/2021 oxycodone hcl 5 mg tablet  6 2 [id number]

## 2021-02-03 NOTE — PROGRESS NOTE ADULT - PROBLEM SELECTOR PLAN 1
CT lumbar spine documents significant stenosis. MR spine demonstrates compression of cauda equina nerve roots at L5-S1. s/p L5/S1 laminectomy and diskectomy 1/30.   - completed medrol dose pack (end 2/1)  - POD 4   - ortho recs appreciated   - pain control: d/c IV Dilaudid and PO oxycodone; will start dilaudid 2mg PO q4 hrs for mod-severe pain   - OOB to chair today  - PT/OT recs appreciated: recommend d/c to acute rehab  - PMNR consult  - bowel regimen as below

## 2021-02-03 NOTE — PROGRESS NOTE ADULT - REASON FOR ADMISSION
35F p/w back pain and numbness over buttocks, groin, and left foot after fall

## 2021-02-03 NOTE — DISCHARGE NOTE NURSING/CASE MANAGEMENT/SOCIAL WORK - PATIENT PORTAL LINK FT
You can access the FollowMyHealth Patient Portal offered by [facility] by registering at the following website: [email or web address]. By joining [employer]’s FollowMyHealth portal, you will also be able to view your health information using other applications (apps) compatible with our system.

## 2021-02-03 NOTE — PROGRESS NOTE ADULT - SUBJECTIVE AND OBJECTIVE BOX
[facility] Division of Hospital Medicine  Yan Jordan DO  Pager (DOLLY-F, 8A-5P): [phone]  Other Times:  [phone]    Patient is a 35y old  Female who presents with a chief complaint of 35F p/w back pain and numbness over buttocks, groin, and left foot after fall (03 Feb 2021 10:13)      SUBJECTIVE / OVERNIGHT EVENTS:    feels ok. upset that we are sticking her for blood and upset no one has been explaining her anything.  +BM    MEDICATIONS  (STANDING):  acetaminophen   Tablet .. 650 milliGRAM(s) Oral every 6 hours  ascorbic acid 500 milliGRAM(s) Oral two times a day  bisacodyl 5 milliGRAM(s) Oral every 12 hours  calcium carbonate 1250 mG  + Vitamin D (OsCal 500 + D) 1 Tablet(s) Oral three times a day  carvedilol 12.5 milliGRAM(s) Oral every 12 hours  cyanocobalamin 1000 MICROGram(s) Oral daily  cyclobenzaprine 10 milliGRAM(s) Oral every 8 hours  folic acid 1 milliGRAM(s) Oral daily  heparin   Injectable 5000 Unit(s) SubCutaneous every 8 hours  hydrochlorothiazide 50 milliGRAM(s) Oral daily  losartan 100 milliGRAM(s) Oral daily  multivitamin 1 Tablet(s) Oral daily  pantoprazole    Tablet 40 milliGRAM(s) Oral before breakfast  polyethylene glycol 3350 17 Gram(s) Oral two times a day  senna 2 Tablet(s) Oral at bedtime    MEDICATIONS  (PRN):  acetaminophen   Tablet .. 650 milliGRAM(s) Oral every 6 hours PRN Temp greater or equal to 38C (100.4F), Mild Pain (1 - 3)  HYDROmorphone   Tablet 2 milliGRAM(s) Oral every 4 hours PRN Moderate-Severe pain  magnesium hydroxide Suspension 30 milliLiter(s) Oral every 12 hours PRN Constipation  ondansetron Injectable 4 milliGRAM(s) IV Push every 6 hours PRN Nausea      CAPILLARY BLOOD GLUCOSE        I&O's Summary    02 Feb 2021 07:01  -  03 Feb 2021 07:00  --------------------------------------------------------  IN: 480 mL / OUT: 900 mL / NET: -420 mL        PHYSICAL EXAM:  Vital Signs Last 24 Hrs  T(C): 36.7 (03 Feb 2021 07:22), Max: 37 (03 Feb 2021 05:26)  T(F): 98 (03 Feb 2021 07:22), Max: 98.6 (03 Feb 2021 05:26)  HR: 90 (03 Feb 2021 09:16) (76 - 100)  BP: 140/86 (03 Feb 2021 09:16) (100/65 - 142/83)  BP(mean): --  RR: 18 (03 Feb 2021 07:22) (18 - 18)  SpO2: 94% (03 Feb 2021 07:22) (94% - 98%)    PHYSICAL EXAM:  GENERAL: obese female in NAD on RA   HEENT: PERRL, no scleral icterus, no head and neck lad   CHEST/LUNG: CTAB, no wheezing, crackles, or ronchi   HEART: RRR, normal S1, S2, no murmurs, gallops, or rubs appreciated   ABDOMEN: soft, nondistended, non-tender, normoactive, no HSM, no rebound, no guarding, no rigidity  : patient deferred rectal tone exam  SKIN: No rashes or lesions  MSK: hemovac  in place over lumbosacral spine w/ scant sanguineous output   NERVOUS SYSTEM: Alert & Oriented X3, strength 5/5 BL LE, gross and fine touch intact bl LE. No saddle anesthesia.      LABS:                        12.3   14.74 )-----------( 149      ( 02 Feb 2021 04:40 )             35.5     02-03    135  |  97  |  15  ----------------------------<  79  4.9   |  23  |  0.49<L>    Ca    9.4      03 Feb 2021 06:57  Phos  3.5     02-03  Mg     2.2     02-03                  RADIOLOGY & ADDITIONAL TESTS:  Results Reviewed:   Imaging Personally Reviewed:  Electrocardiogram Personally Reviewed:    COORDINATION OF CARE:  Care Discussed with Consultants/Other Providers [Y/N]:  Prior or Outpatient Records Reviewed [Y/N]:  med DARRYL Dawson

## 2021-02-03 NOTE — PROGRESS NOTE ADULT - NUTRITIONAL ASSESSMENT
This patient has been assessed with a concern for Malnutrition and has been determined to have a diagnosis/diagnoses of Morbid obesity (BMI > 40).    This patient is being managed with:   Diet Regular-  Entered: Feb 1 2021  7:36AM    
This patient has been assessed with a concern for Malnutrition and has been determined to have a diagnosis/diagnoses of Morbid obesity (BMI > 40).    This patient is being managed with:   Diet Regular-  Entered: Feb 1 2021  7:36AM

## 2021-02-08 PROBLEM — Z00.00 ENCOUNTER FOR PREVENTIVE HEALTH EXAMINATION: Status: ACTIVE | Noted: 2021-02-08

## 2021-02-09 PROBLEM — I10 ESSENTIAL (PRIMARY) HYPERTENSION: Chronic | Status: ACTIVE | Noted: 2021-01-25

## 2021-02-09 PROBLEM — E66.01 MORBID (SEVERE) OBESITY DUE TO EXCESS CALORIES: Chronic | Status: ACTIVE | Noted: 2021-01-26

## 2021-02-12 ENCOUNTER — NON-APPOINTMENT (OUTPATIENT)
Age: 36
End: 2021-02-12

## 2021-02-19 ENCOUNTER — APPOINTMENT (OUTPATIENT)
Dept: ORTHOPEDIC SURGERY | Facility: CLINIC | Age: 36
End: 2021-02-19
Payer: MEDICAID

## 2021-02-19 PROCEDURE — 99024 POSTOP FOLLOW-UP VISIT: CPT

## 2021-02-23 NOTE — HISTORY OF PRESENT ILLNESS
[de-identified] : Ms. VINNIE GARCIA  is a 35 year old female who presents to the office s/p L5-S1 laminectomy on 1/30/21.  She feels 70% better than before surgery.  She was an inpateint consultation. Denies any LE radicular symptoms.  Normal bowel and bladder control.   Denies any recent fevers, chills, sweats, weight loss, or infection.  She is taking meloxicam and a muscle relaxer for pain control. \par

## 2021-02-23 NOTE — PHYSICAL EXAM
[Walker] : ambulates with walker [de-identified] : Her incision is healing well.  Stable neurologic exam.

## 2021-03-05 ENCOUNTER — TRANSCRIPTION ENCOUNTER (OUTPATIENT)
Age: 36
End: 2021-03-05

## 2021-03-19 ENCOUNTER — APPOINTMENT (OUTPATIENT)
Dept: ORTHOPEDIC SURGERY | Facility: CLINIC | Age: 36
End: 2021-03-19

## 2021-11-09 ENCOUNTER — NON-APPOINTMENT (OUTPATIENT)
Age: 36
End: 2021-11-09

## 2021-11-12 ENCOUNTER — APPOINTMENT (OUTPATIENT)
Dept: ORTHOPEDIC SURGERY | Facility: CLINIC | Age: 36
End: 2021-11-12

## 2021-11-24 ENCOUNTER — APPOINTMENT (OUTPATIENT)
Dept: ORTHOPEDIC SURGERY | Facility: CLINIC | Age: 36
End: 2021-11-24
Payer: COMMERCIAL

## 2021-11-24 VITALS — BODY MASS INDEX: 48.82 KG/M2 | HEIGHT: 65 IN | WEIGHT: 293 LBS

## 2021-11-24 DIAGNOSIS — Z78.9 OTHER SPECIFIED HEALTH STATUS: ICD-10-CM

## 2021-11-24 DIAGNOSIS — Z72.3 LACK OF PHYSICAL EXERCISE: ICD-10-CM

## 2021-11-24 DIAGNOSIS — F17.200 NICOTINE DEPENDENCE, UNSPECIFIED, UNCOMPLICATED: ICD-10-CM

## 2021-11-24 DIAGNOSIS — Z86.79 PERSONAL HISTORY OF OTHER DISEASES OF THE CIRCULATORY SYSTEM: ICD-10-CM

## 2021-11-24 PROCEDURE — 99213 OFFICE O/P EST LOW 20 MIN: CPT

## 2021-11-24 PROCEDURE — 99072 ADDL SUPL MATRL&STAF TM PHE: CPT

## 2021-11-24 RX ORDER — VITAMIN B COMPLEX
TABLET ORAL
Refills: 0 | Status: ACTIVE | COMMUNITY

## 2021-11-24 RX ORDER — VITAMIN E ACETATE 670 MG
CAPSULE ORAL
Refills: 0 | Status: ACTIVE | COMMUNITY

## 2021-11-24 RX ORDER — MULTIVITAMIN
TABLET ORAL
Refills: 0 | Status: ACTIVE | COMMUNITY

## 2021-11-24 RX ORDER — CYCLOBENZAPRINE HYDROCHLORIDE 10 MG/1
10 TABLET, FILM COATED ORAL 3 TIMES DAILY
Qty: 60 | Refills: 0 | Status: ACTIVE | COMMUNITY
Start: 2021-11-24 | End: 1900-01-01

## 2021-11-24 RX ORDER — LOSARTAN POTASSIUM AND HYDROCHLOROTHIAZIDE 12.5; 1 MG/1; MG/1
TABLET ORAL
Refills: 0 | Status: ACTIVE | COMMUNITY

## 2021-11-24 RX ORDER — PANTOPRAZOLE SODIUM 40 MG/1
GRANULE, DELAYED RELEASE ORAL
Refills: 0 | Status: ACTIVE | COMMUNITY

## 2021-11-24 RX ORDER — HYDROCHLOROTHIAZIDE 12.5 MG/1
12.5 TABLET ORAL
Refills: 0 | Status: ACTIVE | COMMUNITY

## 2021-11-24 NOTE — PHYSICAL EXAM
[Cane] : ambulates with cane [de-identified] : Her incision is healed.  Stable neurologic exam. [de-identified] : Reviewed lumbar MRI done at [facility] does reveal adequate decompression at the L5-S1 level.  She has moderate stenosis at L4-5 and L3-4.  She appears to have spinal listhesis at L3-4 as well.  Progressive disc degeneration L5-S1.

## 2021-11-24 NOTE — DISCUSSION/SUMMARY
[de-identified] : We reviewed her imaging and discuss further treatment options.  She has tried physical therapy without relief.  We have discussed other interventions including injections.  She will be referred for epidurals.  We also discussed surgery.  I explained this would be a more extensive decompression and likely fusion.  At this point she will proceed with epidural injections.  Follow-up afterwards or sooner with any changes or worsening of her symptoms.
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
## 2021-12-08 ENCOUNTER — APPOINTMENT (OUTPATIENT)
Dept: PHYSICAL MEDICINE AND REHAB | Facility: CLINIC | Age: 36
End: 2021-12-08

## 2021-12-27 ENCOUNTER — APPOINTMENT (OUTPATIENT)
Dept: PHYSICAL MEDICINE AND REHAB | Facility: CLINIC | Age: 36
End: 2021-12-27

## 2021-12-27 ENCOUNTER — APPOINTMENT (OUTPATIENT)
Dept: PHYSICAL MEDICINE AND REHAB | Facility: CLINIC | Age: 36
End: 2021-12-27
Payer: COMMERCIAL

## 2021-12-27 DIAGNOSIS — M43.16 SPONDYLOLISTHESIS, LUMBAR REGION: ICD-10-CM

## 2021-12-27 DIAGNOSIS — M54.16 RADICULOPATHY, LUMBAR REGION: ICD-10-CM

## 2021-12-27 DIAGNOSIS — M51.26 OTHER INTERVERTEBRAL DISC DISPLACEMENT, LUMBAR REGION: ICD-10-CM

## 2021-12-27 DIAGNOSIS — M48.07 SPINAL STENOSIS, LUMBOSACRAL REGION: ICD-10-CM

## 2021-12-27 PROCEDURE — 99072 ADDL SUPL MATRL&STAF TM PHE: CPT

## 2021-12-27 PROCEDURE — 99214 OFFICE O/P EST MOD 30 MIN: CPT

## 2021-12-27 RX ORDER — METHYLPREDNISOLONE 4 MG/1
4 TABLET ORAL
Qty: 1 | Refills: 0 | Status: ACTIVE | COMMUNITY
Start: 2021-12-27 | End: 1900-01-01

## 2021-12-28 ENCOUNTER — TRANSCRIPTION ENCOUNTER (OUTPATIENT)
Age: 36
End: 2021-12-28

## 2021-12-28 PROBLEM — M51.26 LUMBAR HERNIATED DISC: Status: ACTIVE | Noted: 2021-02-23

## 2021-12-28 PROBLEM — M48.07 LUMBOSACRAL STENOSIS: Status: ACTIVE | Noted: 2021-02-23

## 2021-12-28 PROBLEM — M54.16 LUMBAR RADICULOPATHY: Status: ACTIVE | Noted: 2021-02-23

## 2021-12-28 PROBLEM — M43.16 SPONDYLOLISTHESIS OF LUMBAR REGION: Status: ACTIVE | Noted: 2021-11-24

## 2021-12-28 NOTE — ASSESSMENT
[FreeTextEntry1] : 37 yo F who presents with low back pain with radiation into the left lower extremity consistent with lumbar radiculopathy secondary to lumbosacral stenosis and lumbar disc herniation.\par \par I had an extended discussion with Ms. Cramer on this visit.  Various topics were covered including diagnosis, treatment options, and prognosis.  Relevant anatomy and treatment rationale reviewed.  Several treatment options discussed with Ms. Cramer on this visit including lumbar MIKAYLA.  Patient would like to attempt an initial trial of PO corticosteroids and muscle relaxants prior to proceeding with an injection.  Prior to beginning corticosteroids, I recommend that we draw WBC, ESR and CRP to r/o discitis although her presentation is not consistent with this diagnosis.   Lastly, red flag signs and symptoms were reviewed and patient instructed to seek immediate medical attention should these arise.\par \par -Orthopedic notes reviewed\par -MRI lumbar spine w/o contrast reviewed\par -ESR, CRP, and CBC ordered.  Patient will contact us once labs have been drawn to review the results.\par -Start medrol dose pack, dispense 1 pack.  Patient asked to hold off on starting medrol dose pack until after a review of the lab results.  Patient warned to avoid use of PO NSAIDS while on oral steroids.  Possible side effects, including hyperglycemia, GI upset, and GI bleed, reviewed with patient.  Patient instructed to immediately stop medication should she develop any abdominal pain, nausea, vomiting, bloody stools, or BRBPR.  \par -Start PT/HEP, new referral provided\par -RTC 3 weeks, If pain persists or worsen despite compliance with above, then will consider scheduling lumbar MIKAYLA if patient is amenable. \par \par Manoj Kaiser MD\par Spine and Sports Medicine\par \par [facility] [employer]\par At [facility]\par \par \par
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
## 2021-12-28 NOTE — PHYSICAL EXAM
[FreeTextEntry1] : Gen: NAD\par HEENT: neck supple\par CV: no cyanosis\par Pulm: breathing well on room air\par Abd: soft\par Low back: range of motion limited by pain, tenderness to palpation lower lumbar paraspinals and left sciatic notch, +straight leg raise LLE, neg FABERE, neg FAIR\par Left hip: FAROM, non-tender to palpation, neg scour, neg ez, neg fanny\par Msk: \par 5/5 hip flexion B/L, 5/5 knee extension B/L, 5/5 knee flexion B/L, 5/5 dorsiflexion B/L, 5/5 EHL B/L, 5/5 plantar flexion B/L\par 5/5 shoulder abduction B/L, 5/5 elbow flexion B/L, 5/5 elbow extension B/L, 5/5 wrist extension B/L, 5/5 hand  B/L\par Neuro: sensation intact to light touch in bilateral upper and lower extremities, reflexes 2+ brachioradialis, biceps, triceps bilaterally, reflexes 2+ patella, medial hamstring, achilles bilaterally, negative babinski, negative mcgee\par

## 2022-02-01 NOTE — PROGRESS NOTE ADULT - PROBLEM SELECTOR PROBLEM 6
Assessment and Plan:     Problem List Items Addressed This Visit        Cardiovascular and Mediastinum    HTN (hypertension)    Relevant Medications    amLODIPine (NORVASC) 5 mg tablet    lisinopril-hydrochlorothiazide (PRINZIDE,ZESTORETIC) 20-25 MG per tablet      Other Visit Diagnoses     Leukocytosis, unspecified type    -  Primary    Relevant Orders    CBC and differential (Completed)    Medicare annual wellness visit, subsequent        Need for shingles vaccine        Hyperlipidemia, unspecified hyperlipidemia type        Relevant Medications    atorvastatin (LIPITOR) 20 mg tablet    Constipation, unspecified constipation type        Relevant Medications    linaCLOtide (Linzess) 145 MCG CAPS           Preventive health issues were discussed with patient, and age appropriate screening tests were ordered as noted in patient's After Visit Summary  Personalized health advice and appropriate referrals for health education or preventive services given if needed, as noted in patient's After Visit Summary       History of Present Illness:     Patient presents for Medicare Annual Wellness visit    Patient Care Team:  Keesha Mast MD as PCP - General (Family Medicine)  Keesha Mast MD as PCP - [address] [place] [address] ([facility])  Sheila Nielson MD as Endoscopist     Problem List:     Patient Active Problem List   Diagnosis    HTN (hypertension)    GERD (gastroesophageal reflux disease)    Prediabetes    Hypocalcemia    Mixed hyperlipidemia    Chronic pain of both knees    Heat syncope    MIKE (acute kidney injury) [facility])      Past Medical and Surgical History:     Past Medical History:   Diagnosis Date    Cataract     Colon polyp     Diverticulosis     Gastritis     GERD (gastroesophageal reflux disease)     Hyperlipidemia     Hypertension     Throat ulcer     Visual impairment      Past Surgical History:   Procedure Laterality Date    COLONOSCOPY N/A 8/18/2016    Procedure: COLONOSCOPY;  Surgeon: Sheila Nielson MD;  Location:  GI LAB; Service:     COLONOSCOPY W/ POLYPECTOMY  2019    EYE SURGERY Bilateral     UPPER GASTROINTESTINAL ENDOSCOPY  2019      Family History:     Family History   Problem Relation Age of Onset    Hypertension Mother     Hypertension Father       Social History:     Social History     Socioeconomic History    Marital status: Single     Spouse name: None    Number of children: None    Years of education: None    Highest education level: None   Occupational History    None   Tobacco Use    Smoking status: Former Smoker    Smokeless tobacco: Never Used   Vaping Use    Vaping Use: Never used   Substance and Sexual Activity    Alcohol use: No     Comment: pt "sober for 30+ years"    Drug use: No    Sexual activity: Not Currently     Partners: Female     Comment:    Other Topics Concern    None   Social History Narrative    Lives with family     Social Determinants of Health     Financial Resource Strain: Low Risk     Difficulty of Paying Living Expenses: Not hard at all   Food Insecurity: No Food Insecurity    Worried About Running Out of Food in the Last Year: Never true    Ross of Food in the Last Year: Never true   Transportation Needs: No Transportation Needs    Lack of Transportation (Medical): No    Lack of Transportation (Non-Medical):  No   Physical Activity: Inactive    Days of Exercise per Week: 0 days    Minutes of Exercise per Session: 0 min   Stress: Not on file   Social Connections: Not on file   Intimate Partner Violence: Not on file   Housing Stability: Not on file      Medications and Allergies:     Current Outpatient Medications   Medication Sig Dispense Refill    amLODIPine (NORVASC) 5 mg tablet Take 1 tablet (5 mg total) by mouth daily 90 tablet 3    atorvastatin (LIPITOR) 20 mg tablet Take 1 tablet (20 mg total) by mouth daily at bedtime 90 tablet 3    famotidine (PEPCID) 20 mg tablet Take 20 mg by mouth 2 (two) times a day      linaCLOtide (Linzess) 145 MCG CAPS Take 1 capsule (145 mcg total) by mouth daily 30 capsule 5    lisinopril-hydrochlorothiazide (PRINZIDE,ZESTORETIC) 20-25 MG per tablet Take 1 tablet by mouth daily 90 tablet 3     No current facility-administered medications for this visit  Allergies   Allergen Reactions    Lactose - Food Allergy Diarrhea     diarrhea      Immunizations:     Immunization History   Administered Date(s) Administered    COVID-19 PFIZER VACCINE 0 3 ML IM 03/01/2021, 03/22/2021, 10/12/2021    Influenza Quadrivalent, 6-35 Months IM 01/10/2018    Influenza, high dose seasonal 0 7 mL 12/04/2018, 09/24/2020    Influenza, seasonal, injectable 09/22/2015    Pneumococcal Conjugate 13-Valent 01/10/2018    Pneumococcal Polysaccharide PPV23 09/22/2015    Tdap 09/22/2015      Health Maintenance:         Topic Date Due    Colorectal Cancer Screening  09/17/2022    Hepatitis C Screening  Completed     There are no preventive care reminders to display for this patient  Medicare Health Risk Assessment:     /70 (BP Location: Left arm, Patient Position: Sitting, Cuff Size: Standard)   Pulse 78   Temp 97 9 °F (36 6 °C) (Temporal)   Resp 16   Ht 5' 5" (1 651 m)   Wt 92 5 kg (204 lb)   SpO2 98%   BMI 33 95 kg/m²      Laurine Romberg is here for his Subsequent Wellness visit  Last Medicare Wellness visit information reviewed, patient interviewed and updates made to the record today  Health Risk Assessment:   Patient rates overall health as good  Patient feels that their physical health rating is same  Patient is satisfied with their life  Eyesight was rated as same  Hearing was rated as same  Patient feels that their emotional and mental health rating is same  Patients states they are never, rarely angry  Patient states they are sometimes unusually tired/fatigued  Pain experienced in the last 7 days has been none  Patient states that he has experienced no weight loss or gain in last 6 months       Fall Risk Screening: In the past year, patient has experienced: no history of falling in past year      Home Safety:  Patient does not have trouble with stairs inside or outside of their home  Patient has working smoke alarms and has working carbon monoxide detector  Home safety hazards include: none  Nutrition:   Current diet is Regular  Medications:   Patient is currently taking over-the-counter supplements  OTC medications include: see medication list  Patient is able to manage medications  Activities of Daily Living (ADLs)/Instrumental Activities of Daily Living (IADLs):   Walk and transfer into and out of bed and chair?: Yes  Dress and groom yourself?: Yes    Bathe or shower yourself?: Yes    Feed yourself?  Yes  Do your laundry/housekeeping?: Yes  Manage your money, pay your bills and track your expenses?: Yes  Make your own meals?: Yes    Do your own shopping?: Yes    Previous Hospitalizations:   Any hospitalizations or ED visits within the last 12 months?: Yes    How many hospitalizations have you had in the last year?: 1-2    Advance Care Planning:   Living will: No    Durable POA for healthcare: No    Advanced directive: No    Advanced directive counseling given: Yes    Five wishes given: Yes    Patient declined ACP directive: No    End of Life Decisions reviewed with patient: Yes    Provider agrees with end of life decisions: Yes      Cognitive Screening:   Provider or family/friend/caregiver concerned regarding cognition?: No    PREVENTIVE SCREENINGS      Cardiovascular Screening:    General: Screening Not Indicated and History Lipid Disorder    Due for: Lipid Panel      Diabetes Screening:     General: Screening Current    Due for: Blood Glucose      Colorectal Cancer Screening:     General: Screening Current    Due for: FOBT/FIT      Prostate Cancer Screening:    General: Screening Current      Osteoporosis Screening:    General: Risks and Benefits Discussed    Due for: DXA Axial      Abdominal Aortic Aneurysm (AAA) Screening:    Risk factors include: age between 73-67 yo and tobacco use        General: Screening Not Indicated      Lung Cancer Screening:     General: Screening Not Indicated      Hepatitis C Screening:    General: Screening Current    Hep C Screening Accepted: Yes      Screening, Brief Intervention, and Referral to Treatment (SBIRT)    Screening  Typical number of drinks in a day: 0  Typical number of drinks in a week: 0  Interpretation: Low risk drinking behavior  Single Item Drug Screening:  How often have you used an illegal drug (including marijuana) or a prescription medication for non-medical reasons in the past year? never    Single Item Drug Screen Score: 0  Interpretation: Negative screen for possible drug use disorder    Other Counseling Topics:   Alcohol use counseling, car/seat belt/driving safety, skin self-exam, sunscreen and calcium and vitamin D intake and regular weightbearing exercise         Huang Sarabia MD Tobacco abuse counseling

## 2022-02-02 NOTE — ED ADULT NURSE NOTE - SUICIDE SCREENING DEPRESSION
Physical Therapy Daily Treatment      Visit Count: 2   Plan of Care: 1/26/2022 Through: 4/20/2022    Referred by:  Dereck Ulrich MD; Next provider visit (if known/scheduled): Dr Oconnor 3/15/22  Medical Diagnosis (from order):    Incontinence of feces, unspecified fecal incontinence type [R15.9]  - Primary       Change in bowel habits [R19.4]       Pain, abdominal, generalized [R10.84]         Urge incontinence [N39.41]  - Primary    Treatment Diagnosis: Pelvic floor dysfunction with increased pain/symptoms, impaired strength, impaired tissue mobility, impaired joint mobility/play, impaired mobility, impaired activity tolerance, impaired motor function/performance/coordination, impaired bladder health, impaired bowel health    Date of onset/injury: Sept 1, 2021  Date of Surgery: 5/28/2021 Surgery Performed: Colpocleisis, Mid Urethral Sling, Cystoscopy, and Perineorrhaphy; Physician Guidelines no  Diagnosis Precautions: none  Chart reviewed at time of initial evaluation (relevant co-morbidities, allergies, tests and medications listed):  has a past medical history of Atrial fibrillation (CMS/[facility]), Attention deficit disorder with hyperactivity(314.01), Depression, Effusion of lower leg joint (01/22/2004), Esophageal stricture (11/02/2018), Gastroesophageal reflux disease, Generalized anxiety disorder (12/30/2010), Hiatal hernia (11/02/2018), MGD (meibomian gland dysfunction) (2/21/2012), Multinodular goiter (7/9/2015), Osteopenia, Osteoporosis (9/15/2014), Other closed fractures of distal end of radius (alone) (03/15/2004), Pain in joint, lower leg (01/22/2004), PONV (postoperative nausea and vomiting), Primary localized osteoarthrosis, lower leg (01/22/2004), Psoriasis (6/24/2010), Stress fracture of other bone (5/2009), Tear film insufficiency, unspecified (2/21/2012), Thyroid nodule, and Unspecified hypothyroidism (11/23/2009).      SUBJECTIVE   Patient was able to do 5 sec exercise most easily.  Urinary leakage is  better.  Urgency is better with a less full bladder.      Relevant history: \"Patient reports 10 year history of urinary incontinence.   She had Colpocleisis, Mid Urethral Sling, Cystoscopy, and Perineorrhaphy performed in May of last year, but continues to have difficulty with urinary urgency.    In the fall of 2021 she also began to have increased bowel urgency and some leakage\"  Current Pain (0-10 scale): 0   Functional Change: as above    OBJECTIVE   Decreased pelvic floor muscle strength, difficult to coordinate breathing with contraction   Outcome Measures:   Patient-Specific Activity Scoring Scheme  Provide up to 3 activities you are currently having difficulty completing and want to improve with therapy:  Activity Score   Date 1/26/2022   1. Min to no urinary incontinence      2. No bowel leakage     3. Decreased difficulty with cleaning following bowel movements     Average Score     Each activity is scored: 0=unable to perform activity to 10=able to perform activity at the same level as before injury to problem  Total score = sum of the activity scores/number of activities  Minimum detectable change (90%CI) for average score = 2 points  Minimum detectable change (90%CI) for single activity score = 3 points    Treatment   Therapeutic Exercise  to increase strength and proprioception of pelvic floor muscles, computer assisted for therapist visualization and assessment of exercise performance.  Pelvic Floor Assessment or treatment external: rectal  Verbal informed consent was received today from patient for external pelvic floor muscle assessment and treatment. Patient was given alternative options. Benefits and drawbacks were explained. Third person was offered to be in room during session, patient declined.    external electrodes - placed with therapist assist    Resting tone:   2.1 µV  pelvic  sitting      Pelvic floor muscle exercise  5  second  Holds  x 10,  5.1µV work ave, 2.8 µV  rest ave  Pelvic floor  muscle exercise  2 second  holds 2 x 10 4.9 µV work ave, 2.6µV  rest ave  Pelvic floor muscle exercise  5  second  Holds  x 10,   5.7 µV work ave, 3.1  µV  rest ave- note increase in use of gluteals with fatigue  neuromuscular re-education   Breathing and pelvic floor muscle contraction x 2 minute(s) with instruction   Introduced Urge control.      Skilled input: verbal instruction/cues, tactile instruction/cues    Home Program:   2, 5, sec contractions in seated and supine breathing with pelvic floor muscle exercise.  Not reaction to strong urge, relax     Writer verbally educated the patient and received verbal consent from the patient on hand placement, positioning of patient, and techniques to be performed today.       Suggestions for next session as indicated: progress per plan of care, may do bladder diary, urge control, progress strength     ASSESSMENT   Patient is showing good pelvic floor muscle proprioception.  She is aware to decrease use of gluteals with pelvic floor muscle contraction.  Breath a and pelvic floor muscle coordination is difficult.  Pain after treatment (patient reported, 0-10 scale): 0  Result of above outlined education: Verbalizes understanding and Demonstrates understanding    THERAPY DAILY BILLING   Insurance: [facility] MEDICARE ADVANTAGE 2.     Procedures and total treatment time documented time entry flow sheet.        Negative

## 2022-03-14 NOTE — PROGRESS NOTE ADULT - PROBLEM SELECTOR PLAN 5
Photo Preface (Leave Blank If You Do Not Want): Photographs were obtained today Detail Level: Zone - on senna and miralax  - will liberalize diet today to help facilitate BM

## 2023-05-03 NOTE — PHYSICAL THERAPY INITIAL EVALUATION ADULT - BED MOBILITY TRAINING, PT EVAL
GOAL: Pt will perform all bed mobility independently to prevent skin breakdown in 2 weeks.
Patient/Caregiver provided printed discharge information.

## 2023-09-06 NOTE — PHYSICAL THERAPY INITIAL EVALUATION ADULT - STANDING BALANCE: STATIC
[de-identified] : Right shoulder exam  Inspection: No swelling, ecchymosis or gross deformity. Skin: No masses, No lesions Tenderness: + bicipital tenderness, + tenderness to the greater tuberosity/RTC insertion, no anterior shoulder/lesser tuberosity tenderness. No tenderness SC joint, clavicle, AC joint. ROM: 90/30/L5 Impingement tests: Positive Galindo AC Joint: no pain with cross arm testing Biceps: Negative speed Strength: 5-/5 abduction, 5/5 external rotation, and internal rotation Neuro: AIN, PIN, Ulnar nerve motor intact Sensation: Intact to light touch in radial, median, ulnar, and axillary nerve distributions Vasc: 2+ radial pulse
fair plus
+rolling walker/good minus

## 2023-10-27 NOTE — PROGRESS NOTE ADULT - PROBLEM SELECTOR PLAN 3
SBPs better controlled   -c/w carvedilol 12.5mg BID  -c/w home losartan 100 mg   -c/w HCTZ 50 mg Protopic Pregnancy And Lactation Text: This medication is Pregnancy Category C. It is unknown if this medication is excreted in breast milk when applied topically.

## 2023-11-24 NOTE — DIETITIAN INITIAL EVALUATION ADULT. - PROBLEM SELECTOR PROBLEM 5
Contacted mom     Ear pain  Onset Mon 11/20  Felt ear was \"clogged\"  Tried OTC drops on Tues 11/21  No relief     Afebrile   Denies nasal congestion   No change to appetite  Tolerating fluids     Appointment scheduled for Fri 11/24 at 12:20PM with [facility].
Mom called in regarding patient, ear pain, patient states it feel like her ears are clogged.   Mom requests a nurse to call for guidance
Class 3 severe obesity with serious comorbidity and body mass index (BMI) of 50.0 to 59.9 in adult, unspecified obesity type
